# Patient Record
Sex: FEMALE | URBAN - METROPOLITAN AREA
[De-identification: names, ages, dates, MRNs, and addresses within clinical notes are randomized per-mention and may not be internally consistent; named-entity substitution may affect disease eponyms.]

---

## 2024-08-16 ENCOUNTER — TELEPHONE (OUTPATIENT)
Dept: SPORTS MEDICINE | Facility: CLINIC | Age: 24
End: 2024-08-16
Payer: COMMERCIAL

## 2024-08-16 NOTE — TELEPHONE ENCOUNTER
I left a message for the pt explaining that we need to see if she can come to the [place] for her enrique with Dr. Meza next week because he has had a change in his schedule and will no longer be doing clinic at [place]. I asked her to call us back to let us know if that worked or if we needed to reschedule that.

## 2024-08-20 DIAGNOSIS — S83.512A RUPTURE OF ANTERIOR CRUCIATE LIGAMENT OF LEFT KNEE, INITIAL ENCOUNTER: Primary | ICD-10-CM

## 2024-08-21 ENCOUNTER — HOSPITAL ENCOUNTER (OUTPATIENT)
Dept: RADIOLOGY | Facility: HOSPITAL | Age: 24
Discharge: HOME | End: 2024-08-21
Attending: ORTHOPAEDIC SURGERY
Payer: COMMERCIAL

## 2024-08-21 ENCOUNTER — OFFICE VISIT (OUTPATIENT)
Dept: SPORTS MEDICINE | Facility: CLINIC | Age: 24
End: 2024-08-21
Payer: COMMERCIAL

## 2024-08-21 ENCOUNTER — PATIENT MESSAGE (OUTPATIENT)
Dept: SPORTS MEDICINE | Facility: CLINIC | Age: 24
End: 2024-08-21

## 2024-08-21 ENCOUNTER — RESEARCH ENCOUNTER (OUTPATIENT)
Dept: RESEARCH | Facility: HOSPITAL | Age: 24
End: 2024-08-21
Payer: COMMERCIAL

## 2024-08-21 VITALS — BODY MASS INDEX: 23 KG/M2 | HEIGHT: 62 IN | WEIGHT: 125 LBS

## 2024-08-21 DIAGNOSIS — S89.90XA INJURY OF POSTEROLATERAL CORNER OF KNEE, INITIAL ENCOUNTER: ICD-10-CM

## 2024-08-21 DIAGNOSIS — S83.512A RUPTURE OF ANTERIOR CRUCIATE LIGAMENT OF LEFT KNEE, INITIAL ENCOUNTER: Primary | ICD-10-CM

## 2024-08-21 DIAGNOSIS — S83.222A PERIPHERAL TEAR OF MEDIAL MENISCUS OF LEFT KNEE AS CURRENT INJURY, INITIAL ENCOUNTER: ICD-10-CM

## 2024-08-21 DIAGNOSIS — S83.512A RUPTURE OF ANTERIOR CRUCIATE LIGAMENT OF LEFT KNEE, INITIAL ENCOUNTER: ICD-10-CM

## 2024-08-21 DIAGNOSIS — S82.192A OTHER CLOSED FRACTURE OF PROXIMAL END OF LEFT TIBIA, INITIAL ENCOUNTER: ICD-10-CM

## 2024-08-21 PROCEDURE — 73562 X-RAY EXAM OF KNEE 3: CPT | Mod: TC,RT

## 2024-08-21 PROCEDURE — 77073 BONE LENGTH STUDIES: CPT | Mod: TC

## 2024-08-21 PROCEDURE — 73590 X-RAY EXAM OF LOWER LEG: CPT | Mod: TC,LT

## 2024-08-21 PROCEDURE — 99999 PR PBB SHADOW E&M-EST. PATIENT-LVL IV: CPT | Mod: PBBFAC,,, | Performed by: ORTHOPAEDIC SURGERY

## 2024-08-21 PROCEDURE — 99204 OFFICE O/P NEW MOD 45 MIN: CPT | Mod: S$GLB,,, | Performed by: ORTHOPAEDIC SURGERY

## 2024-08-21 PROCEDURE — 73564 X-RAY EXAM KNEE 4 OR MORE: CPT | Mod: TC,LT

## 2024-08-21 PROCEDURE — 1159F MED LIST DOCD IN RCRD: CPT | Mod: CPTII,S$GLB,, | Performed by: ORTHOPAEDIC SURGERY

## 2024-08-21 PROCEDURE — 3008F BODY MASS INDEX DOCD: CPT | Mod: CPTII,S$GLB,, | Performed by: ORTHOPAEDIC SURGERY

## 2024-08-21 RX ORDER — VALACYCLOVIR HYDROCHLORIDE 500 MG/1
1 TABLET, FILM COATED ORAL EVERY MORNING
COMMUNITY
Start: 2024-06-24

## 2024-08-21 NOTE — PROGRESS NOTES
PROTOCOL: Amnion Graft Study  SUBJECT  NUMBER: 11          Visit: Screening  Investigator: Dr. Sarkis Meza     Patient was seen in clinic today for screening visit for the Amnion Graft study.      Informed Consent:      Consenting was completed in private area using the most current IRB approved version of the main Informed Consent Form (ICF) by myself. The patient was given ample time to review the consents prior to execution of the main ICF.     Prior to the ICF being signed, or any study protocol required data collection, testing, procedure, or intervention being performed, the following was done and/or discussed:?   Patient was given a copy of the ICF for review: yes?   Purpose of the study and qualifications to participate: yes???   Study design, follow up schedule, and tests or procedures done at each visit: yes??   Confidentiality and HIPAA Authorization for Release of Medical Records for the research trial/ subject's rights/research related injury: yes??   Risk, Benefits, Alternative Treatments, Compensation and Costs: yes??   Participation in the research trial is voluntary and patient may withdraw at anytime: yes??   Contact information for study related questions: yes??      Patient verbalizes understanding of the above: : yes?   Contact information for CRC and PI given to patient: : yes?   Patient able to adequately summarize: the purpose of the study, the risks associated with the study, and all procedures, testing, and follow-ups associated with the study: yes?      Devika Perez signed the IRB approved version of the main ICF.? All pages of the consents were reviewed with the patient, and all questions answered satisfactorily. The patient signed the paper consent form.      Patient received a fully executed copy of same (copy of informed consent made and provided to patient). The original consents were scanned into electronic medical records (EPIC).     Time of Consent Execution: 10:55 am      Screening was registered in [employer].     Physician assessments will be completed per protocol at her pre-op visit on 04Sep2024.     Patient Assessments completed? No. Patient will complete questionnaires via [employer] portal once she recives the message.                Concomitant medications and medical history listed in the patient's electronic record were reviewed with the patient to ensure accuracy.         End of Visit:     Patient was instructed that her surgery is scheduled for 10Sep2024 and that she would receive follow-up questionnaires via the [employer] portal for research. Patient verbalized understanding, and has all of our contact numbers should she need to get in touch with us before that time

## 2024-08-21 NOTE — PROGRESS NOTES
Patient ID: Smooth Perez  YOB: 2000  MRN: [id number]    Chief Complaint: Pain and Injury of the Left Knee    Referred By: [facility]     History of Present Illness: Smooth Perez is a  24 y.o. female   About to start [employer] School with a chief complaint of Pain and Injury of the Left Knee    Smooth presents today with left knee pain and swelling. She reports an injured 7/20 while playing football in [place]. She reports her knee gave out when she went to [facility] and felt a pop. She was evaluated in [place] where she was for the summer with an MRI. She was told she had an ACL surgery. She has not started rehab yet due to returning to [place] and travel. She reports continued instabiltiy in her knee at this time.     Pain    Injury      Past Medical History:   History reviewed. No pertinent past medical history.  History reviewed. No pertinent surgical history.  No family history on file.  Social History     Socioeconomic History    Marital status: Single     Medication List with Changes/Refills   Current Medications    VALACYCLOVIR (VALTREX) 500 MG TABLET    Take 1 tablet by mouth every morning.     Review of patient's allergies indicates:  No Known Allergies  ROS    Physical Exam:   Body mass index is 22.86 kg/m².  There were no vitals filed for this visit.   GENERAL: Well appearing, appropriate for stated age, no acute distress.  CARDIOVASCULAR: Pulses regular by peripheral palpation.  PULMONARY: Respirations are even and non-labored.  NEURO: Awake, alert, and oriented x 3.  PSYCH: Mood & affect are appropriate.  HEENT: Head is normocephalic and atraumatic.            Right Knee Exam     Range of Motion   Extension:  -10   Flexion:  140     Left Knee Exam     Inspection   Effusion: present    Range of Motion   Extension:  -10   Flexion:  140     Tests   Stability   Lachman: abnormal   PCL-Posterior Drawer: normal (0 to 2mm)  MCL - Valgus: normal (0 to 2mm)  LCL - Varus: normal (0  to 2mm)  Pivot Shift: abnormal    Other   Sensation: normal    Comments:  7/9 Beighton Score   Unable to assess ligament fully due to hamstring firing, guarding, and     Vascular Exam       Left Pulses  Dorsalis Pedis:      2+  Posterior Tibial:      2+          Imaging:    Results  MRI Knee Without Contrast Left (Order [id number])     MRI Knee Without Contrast Left  Order: [id number]  Impression    Complete tear of the anterior cruciate ligament through its mid substance.    Lateral collateral ligament complex is macroscopically intact.  Edema posterior to the posterolateral joint capsule, popliteus muscle, and fibular head of the soleus muscle raises the possibility of a soft tissue injury along the posterolateral corner.    Clinical correlation requested.    Focal marrow edema in the lateral femoral condyle, posterolateral tibia, and posteromedial tibia, compatible with bone contusions.  At the posteromedial corner of the tibia, there is a small cortical interruption, compatible with a nondisplaced fracture  of the posteromedial corner of the tibial plateau, probably with slight depression.  If clinically indicated, this could be further assessed on CT.  Elsewhere, no discrete fracture identified.    High signal along the periphery of the medial meniscus near the junction with the body, at the site of the bone contusion.  A menisco-capsular tear is difficult to fully exclude in this setting.  Elsewhere, no medial or lateral meniscal tear detected.    Small joint effusion.        NOTIFICATION:  Notification was entered by Dr. Jackie Gifford on 7/24/2024 at 17:04 into the [employer] Findings Management dashboard for communication to the referring provider.      Jackie Gifford MD, electronically signed on Jul 24 2024 05:05PM  Narrative    EXAMINATION:  MR KNEE LEFT WO CONTRAST    INDICATION:  r/o ACL rupture    TECHNIQUE:  Multiplanar images of the knee were performed without the administration of intravenous contrast  using a routine MRI knee protocol    COMPARISON:  Left knee radiographs dated 23 July 2024    FINDINGS:  Fluid: Moderate size joint effusion.  Mild nonspecific tenosynovitis.  Trace fluid in a Baker's cyst, small amounts of fluid and edema tracking along medial sided tendons into the calf.    Medial meniscus: Vertigo linear high signal along the periphery of the medial meniscus near the junction of the posterior horn and body is nonspecific (05:23).  A meniscal capsular tear is difficult to fully exclude in this setting.  Elsewhere, no  meniscal tear detected.  Lateral meniscus: No meniscal tear detected    Anterior cruciate ligament:  Complete tear through the midsubstance of the ACL (05:15).  Anterior translation of the tibia with respect to the distal femur (04:10)  Posterior cruciate ligament:  Normal.    Medial collateral ligament:  Normal.  Lateral collateral ligamentous complex:  Within normal limits.  Edema posterior posterolateral joint capsule and popliteus muscle and adjacent to the fibular head of the soleus muscle (03:25).  In the appropriate clinical setting, this could reflect an  injury to the posterolateral corner.    Extensor mechanism:  The patellar and distal quadriceps tendons are within normal limits.    Articular cartilage  Patellofemoral:  Grossly preserved.  Medial femorotibial: Grossly preserved.  Lateral femorotibial: Grossly preserved.    Bone marrow: Focal marrow edema in the anterior weight-bearing lateral femoral condyle and posterolateral tibia, compatible with pivot-shift mechanism bone contusions (5:9-10).  In addition, there is focal marrow edema in the posterior medial tibia.    High signal extends through the tibial plateau cortex deep to the posterior horn adjacent to the posterior horn of the medial meniscus raising the possibility of a small tibial plateau fracture along the extreme posteromedial edge of the tibial plateau,  possibly with cortical width depression  (05:21).    Muscles: Muscle bulk preserved.  No gross muscle edema.    Other: Mild patchy edema in the subcutaneous soft tissues along the anteromedial aspect of the knee and anterior to the inferior patella/proximal patellar tendon and proximal tibia.  Mild patchy edema in the popliteal fossa  Exam End: 07/24/24 13:45    Specimen Collected: 07/24/24 16:06 Last Resulted: 07/24/24 16:05   Received From: [facility]  Result Received: 08/14/24 13:24      R Knee 3 VW Left  Order: [id number]  Narrative    EXAMINATION:  XR KNEE 3 VW LEFT    INDICATION:  pain    COMPARISON:  None    FINDINGS:  Left knee: Upright AP, lateral and sunrise view of the left knee show no fracture or dislocation.  No radiopaque foreign body or destructive bone lesion.  No significant degenerative change.    Right knee: Limited single view upright AP of the left knee shows no significant degenerative change.      Carlos Corbett MD, electronically signed on Jul 23 2024 12:47PM  Exam End: 07/23/24 10:01    Specimen Collected: 07/23/24 11:48 Last Resulted: 07/23/24 11:47   Received From: [facility]  Result Received: 08/14/24 13:24          Relevant imaging results reviewed and interpreted by me, discussed with the patient and / or family today.  Agree with imaging read    Other Tests:         Patient Instructions   Assessment:  Smooth Perez is a  24 y.o. female   About to start [employer] with a chief complaint of Pain and Injury of the Left Knee    Left knee injury during recreational football on 7/20/24  ACL tear   Medial meniscus tear, ramp lesion     Encounter Diagnoses   Name Primary?    Rupture of anterior cruciate ligament of left knee, initial encounter Yes    Peripheral tear of medial meniscus of left knee as current injury, initial encounter     Other closed fracture of proximal end of left tibia, initial encounter     Injury of posterolateral corner of knee, initial encounter         Plan:  Discussed treatment  options including surgery and non-operative manage. Advised patient will likely need surgery due to the level of knee instability.  Will hold surgery date of 9/10   Order PT at [facility] - 2-3x per week for 6-8 weeks  With [facility] for ACL prehab  Pre-op labs on the way out  ACL xrays including alignment and full  lateral tibia to assess posterior slope    Timing of Common ACL Milestones (Pain, Walking, etc)  Functional Progression after ACL Surgery (Running, etc)  ACL Reconstruction Information      Follow-up: 2-3 weeks for pror sooner if there are any problems between now and then.    Leave Review:   Google: Leave Google Review  Healthgrades: Leave Healthgrades Review    After Hours Number: [phone]       Provider Note/Medical Decision Making:       I had a long discussion with the patient and any present family regarding treatment options. I explained that although the ACL will usually not heal on its own, that some people are able to function well without an ACL. We discussed the continued risk of meniscal damage if the patient has repeat instability and buckling-type episodes. We discussed graft options and the differences between allograft and autograft, and the pros and cons of the different allograft and autograft types including, but not limited to, bone-patellar tendon-bone, quadriceps tendon, and hamstring. We discussed the expected recovery time of a minimum of 6-9 months after surgery before return to cutting or contact activity. We discussed that [employer] players take an average of 10-11 months before return to play.  We discussed that some studies show a return to play prior to 9 months increases the risk of retear by a factor of 7.  We also discussed the need for strict adherence to the postoperative protocol and rehabilitation instructions.  We discussed the risk of arthrofibrosis and some of the precautions and postoperative rehabilitation specifics needed to lessen this risk.  We discussed the risk of  retear and the risk of arthritis relative to the ACL injury, with and without surgery.  I had a long discussion with the patient and any present family regarding treatment options. I explained that although the meniscus will usually not heal on its own, not all meniscus tears need surgery. We discussed that many people will improve with therapy and nonoperative management. We discussed the blood supply of the meniscus and the fact that some patients and some tear patterns are more amenable to repair. We discussed that when performing operative treatment of meniscus tears, we attempt to repair tears that we think need to be repaired and have a good chance of healing. If we do perform a meniscectomy, we attempt to leave as much meniscus intact as possible. We discussed the implications of having a torn or deficient meniscus. We discussed the rehab, weight bearing, and postoperative differences between repair and resection, and we discussed postoperative expectations.  I discussed worrisome and red flag signs and symptoms with the patient. The patient expressed understanding and agreed to alert me immediately or to go to the emergency room if they experience any of these.   Treatment plan was developed with input from the patient/family, and they expressed understanding and agreement with the plan. All questions were answered today.          Sarkis Meza MD  Orthopaedic Surgery & Sports Medicine       Disclaimer: This note was prepared using a voice recognition system and is likely to have sound alike errors within the text.     I, Moni Butt, acted as a scribe for Sarkis Meza MD for the duration of this office visit.

## 2024-08-21 NOTE — PATIENT INSTRUCTIONS
Assessment:  Smooth Perez is a  24 y.o. female   About to start [employer] School with a chief complaint of Pain and Injury of the Left Knee    Left knee injury during recreational football on 7/20/24  ACL tear   Medial meniscus tear, ramp lesion     Encounter Diagnoses   Name Primary?    Rupture of anterior cruciate ligament of left knee, initial encounter Yes    Peripheral tear of medial meniscus of left knee as current injury, initial encounter     Other closed fracture of proximal end of left tibia, initial encounter     Injury of posterolateral corner of knee, initial encounter         Plan:  Discussed treatment options including surgery and non-operative manage. Advised patient will likely need surgery due to the level of knee instability.  Will hold surgery date of 9/10   Order PT at [facility] - 2-3x per week for 6-8 weeks  With [facility] for ACL prehab  Pre-op labs on the way out  ACL xrays including alignment and full  lateral tibia to assess posterior slope    Timing of Common ACL Milestones (Pain, Walking, etc)  Functional Progression after ACL Surgery (Running, etc)  ACL Reconstruction Information      Follow-up: 2-3 weeks for pror sooner if there are any problems between now and then.    Leave Review:   Google: Leave Google Review  Healthgrades: Leave Healthgrades Review    After Hours Number: [phone]

## 2024-08-23 ENCOUNTER — CLINICAL SUPPORT (OUTPATIENT)
Facility: HOSPITAL | Age: 24
End: 2024-08-23
Attending: ORTHOPAEDIC SURGERY
Payer: COMMERCIAL

## 2024-08-23 DIAGNOSIS — M62.81 WEAKNESS OF LEFT QUADRICEPS MUSCLE: ICD-10-CM

## 2024-08-23 DIAGNOSIS — M62.559 ATROPHY OF QUADRICEPS FEMORIS MUSCLE: ICD-10-CM

## 2024-08-23 DIAGNOSIS — S83.512A RUPTURE OF ANTERIOR CRUCIATE LIGAMENT OF LEFT KNEE, INITIAL ENCOUNTER: ICD-10-CM

## 2024-08-23 DIAGNOSIS — S83.512D SPRAIN OF ANTERIOR CRUCIATE LIGAMENT OF LEFT KNEE, SUBSEQUENT ENCOUNTER: Primary | ICD-10-CM

## 2024-08-23 PROCEDURE — 97014 ELECTRIC STIMULATION THERAPY: CPT | Mod: PN

## 2024-08-23 PROCEDURE — 97161 PT EVAL LOW COMPLEX 20 MIN: CPT | Mod: PN

## 2024-08-23 PROCEDURE — 97112 NEUROMUSCULAR REEDUCATION: CPT | Mod: PN

## 2024-08-23 PROCEDURE — 97016 VASOPNEUMATIC DEVICE THERAPY: CPT | Mod: PN

## 2024-08-23 NOTE — PLAN OF CARE
[facility]   Physical Therapy Initial Evaluation      Date: 8/23/2024   Name: Smooth Perez  Clinic Number: [id number]    Therapy Diagnosis:   Encounter Diagnoses   Name Primary?    Rupture of anterior cruciate ligament of left knee, initial encounter     Sprain of anterior cruciate ligament of left knee, subsequent encounter Yes    Weakness of left quadriceps muscle     Atrophy of quadriceps femoris muscle       Physician: Sarkis Meza MD     Physician Orders: PT Eval and Treat  Medical Diagnosis from Referral: Rupture of anterior cruciate ligament of left knee, initial encounter [S83.512A]   Evaluation Date: 8/23/2024  Authorization Period Expiration: 8/21/25  Plan of Care Expiration: 9/11/2024  Progress Note Due: 9/11/2024  Visit # / Visits authorized: 1/1   FOTO: 1/3 (last performed on 8/23/2024)    Precautions: Standard    Time In: 8:55 AM  Time Out: 9:50 AM  Total Billable Time (timed & untimed codes): 55 minutes    Subjective     Date of onset: 7/20/2024    History of current condition - Smooth reports she injured her knee while playing football in [place]. 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  Prior Level of Function: Independent and pain free with all ADL, IADL, community mobility and functional activities.   Current Level of Function: Independent with all ADL, IADL, community mobility and functional activities with reports of increased pain and need for increased time and frequent breaks.      Dominant Extremity:    [] Right    [x] Left    Pts goals: Pt reported goals are to decrease overall pain levels in order to return to prior functional level.     Medical History:   No past  medical history on file.    Surgical History:   Smooth Perez  has no past surgical history on file.    Medications:   Smooth has a current medication list which includes the following prescription(s): valacyclovir.    Allergies:   Review of patient's allergies indicates:  No Known Allergies     Objective        Range of Motion:   L Knee PROM: 4-0-125  R Knee PROM: 4-0-144      Lower Extremity Strength  Left LE  Right LE    Knee extension: 4/5 Knee extension: 5/5   Knee flexion: 4/5 Knee flexion: 5/5   Hip extension:  4+/5 Hip extension: 4+/5   Hip abduction: NT Hip abduction: NT         Function:  - Gait within normal limits   - Squat: able to partial squat and rise to stand   - Single Leg Squat: not tested  - Single Leg Step Down Test: not tested        Function:     CMS Impairment/Limitation/Restriction for FOTO Knee Survey    Therapist reviewed FOTO scores for Smooth on 8/23/2024.   FOTO documents entered into [email or web address] - see Media section.    Functional Score: 57%         Treatment     Total Treatment time (time-based codes) separate from Evaluation: (30) minutes     Smooth received the treatments listed below:    Intervention Code  (see below chart) Date/Notes  8/23/24   GameReady  10 minutes   NMES: Quad Sets NR 10 minutes   Mini Squats NR 10 minutes     0 minutes of Therapeutic Exercise (TE) to develop strength, endurance, ROM, and flexibility. (15858)  0 minutes of Manual therapy (MT) to improve pain and ROM. (80419)  15 minutes of Neuromuscular Re-Education (NR)  to improve: Balance, Coordination, Kinesthetic, Sense, Proprioception, and Posture. (44417)  0 minutes of Therapeutic Activities (TA) to improve functional performance. (93210)  Unattended Electrical Stimulation (ES) for muscle performance and/or pain modulation. (75808)  Vasopneumatic Device Therapy () for management of swelling/edema. (86264)  BFR: Blood flow restriction applied during exercise  NP: Not Performed      Patient Education and  Home Exercises     Education provided:  PURPOSE: Patient educated on the impairments noted above and the effects of physical therapy intervention to improve overall condition and QOL.   EXERCISE: Patient was educated on all the above exercise prior/during/after for proper posture, positioning, and execution for safe performance with home exercise program.   STRENGTH: Patient educated on the importance of improved core and extremity strength in order to improve alignment of the spine and extremities with static positions and dynamic movement.     Written Home Exercises Provided: yes.  Exercises were reviewed and Smooth was able to demonstrate them prior to the end of the session.  Smooth demonstrated good  understanding of the education provided. See EMR under Patient Instructions for exercises provided during therapy sessions.    Assessment     Smooth is a 24 y.o. female referred to outpatient Physical Therapy with a medical diagnosis of Rupture of anterior cruciate ligament of left knee, initial encounter [S83.512A] . Pt presents with impairments in the following categories: IMPAIRMENTS: strength, endurance, joint mobility, core strength and stability, and functional movement patterns. Prioritize good Range of motion and quadriceps activation in preparation for ACL surgery.    Pt prognosis is Good  Pt will benefit from skilled outpatient Physical Therapy to address the deficits stated above and in the chart below, provide pt/family education, and to maximize pt's level of independence.     Plan of care discussed with patient: Yes  Pt's spiritual, cultural and educational needs considered and patient is agreeable to the plan of care and goals as stated below:     Anticipated Barriers for therapy: none    Medical Necessity is demonstrated by the following  History  Co-morbidities and personal factors that may impact the plan of care [x] LOW: no personal factors / co-morbidities  [] MODERATE: 1-2 personal factors /  "co-morbidities  [] HIGH: 3+ personal factors / co-morbidities    Moderate / High Support Documentation:   No past medical history on file.     Examination  Body Structures and Functions, activity limitations and participation restrictions that may impact the plan of care [x] LOW: addressing 1-2 elements  [] MODERATE: 3+ elements  [] HIGH: 4+ elements (please support below)    Moderate / High Support Documentation: See above in "Current Level of Function"      Clinical Presentation [x] LOW: stable  [] MODERATE: Evolving  [] HIGH: Unstable     Decision Making/ Complexity Score: low           Goals:  3 weeks Status Date Met   PAIN: Pt will report worst pain of 2/10 in order to progress toward max functional ability and improve quality of life [x] Progressing  [] Met  [] Not Met    FUNCTION: Patient will demonstrate improved function as indicated by a functional score of greater than or equal to 55 out of 100 on FOTO. [x] Progressing  [] Met  [] Not Met    MOBILITY: Patient will improve AROM to stated goals, listed in objective measures above, in order to return to maximal functional potential and improve quality of life. Goal : L knee flexion to 135 degrees [x] Progressing  [] Met  [] Not Met    STRENGTH: Patient will improve strength to stated goals, listed in objective measures above, in order to improve functional independence and quality of life. Goal: 4+/5 quad strength L [x] Progressing  [] Met  [] Not Met    GAIT: Patient will demonstrate normalized gait mechanics with minimal compensation in order to return to PLOF. [x] Progressing  [] Met  [] Not Met      Plan     Plan of care Certification: 8/23/2024 to 9/11/24.    Outpatient Physical Therapy 2 times weekly for 4 weeks to include any combination of the following interventions: virtual visits, dry needling, modalities, electrical stimulation (IFC, Pre-Mod, Attended with Functional Dry Needling), Manual Therapy, Neuromuscular Re-ed, Patient Education, Self " Care, Therapeutic Exercise, and Therapeutic Activites       Iraj Huang, PT, DPT  Physical Therapist  Board-Certified Specialist in Orthopaedic and Sports Physical Therapy  8/23/2024      I CERTIFY THE NEED FOR THESE SERVICES FURNISHED UNDER THIS PLAN OF TREATMENT AND WHILE UNDER MY CARE   Physician's comments:     Physician's Signature: ___________________________________________________

## 2024-08-23 NOTE — PROGRESS NOTES
See plan of care for initial evaluation.        Iraj Huang, PT, DPT  Physical Therapist  Board-Certified Specialist in Orthopaedic and Sports Physical Therapy  8/23/2024

## 2024-08-27 ENCOUNTER — CLINICAL SUPPORT (OUTPATIENT)
Facility: HOSPITAL | Age: 24
End: 2024-08-27
Payer: COMMERCIAL

## 2024-08-27 DIAGNOSIS — S83.512D SPRAIN OF ANTERIOR CRUCIATE LIGAMENT OF LEFT KNEE, SUBSEQUENT ENCOUNTER: Primary | ICD-10-CM

## 2024-08-27 PROCEDURE — 97140 MANUAL THERAPY 1/> REGIONS: CPT | Mod: PN

## 2024-08-27 PROCEDURE — 97112 NEUROMUSCULAR REEDUCATION: CPT | Mod: PN

## 2024-08-27 PROCEDURE — 97110 THERAPEUTIC EXERCISES: CPT | Mod: PN

## 2024-08-27 PROCEDURE — 97014 ELECTRIC STIMULATION THERAPY: CPT | Mod: PN

## 2024-08-27 PROCEDURE — 97016 VASOPNEUMATIC DEVICE THERAPY: CPT | Mod: PN

## 2024-08-27 PROCEDURE — 97750 PHYSICAL PERFORMANCE TEST: CPT | Mod: PN

## 2024-08-27 NOTE — PROGRESS NOTES
[facility] OUTPATIENT THERAPY AND WELLNESS   Physical Therapy Treatment Note     Name: Smooth Perez  Clinic Number: [id number]    Therapy Diagnosis:   Encounter Diagnosis   Name Primary?    Sprain of anterior cruciate ligament of left knee, subsequent encounter Yes     Physician: Sarkis Meza MD    Visit Date: 8/27/2024    Physician Orders: PT Eval and Treat  Medical Diagnosis from Referral: Rupture of anterior cruciate ligament of left knee, initial encounter [S83.512A]   Evaluation Date: 8/23/2024  Authorization Period Expiration: 8/21/25  Plan of Care Expiration: 9/11/2024  Progress Note Due: 9/11/2024  Visit # / Visits authorized: 1/12 (1/1 eval)  FOTO: 1/3 (last performed on 8/23/2024)    Time In: 1:30 PM  Time Out: 2:45 PM  Total Billable Time: 75 minutes    SUBJECTIVE     Pt reports: she is doing well today. She reports she has been consistent working on her HEP.  She was compliant with home exercise program.  Response to previous treatment: no notable change  Functional change: no notable change    Pain: 1/10  Location: left knee    OBJECTIVE        Contralateral Limb Biodex at 60 degrees          Treatment     Intervention Code  (see below chart) Date/Notes  8/27/24   GameReady  10 minutes   Manual Therapy  MT Patella mobs; knee PROM   Upright Bike TE 10 minutes   NMES: Quad Sets NR 10 minutes   NMES: SLR NR 10 minutes   Squats NR At 18 inch box ; 3x10   Single leg balance NR 5x30s   Biodex Test PPT Non-op limb at 60 degrees per second     10 minutes of Therapeutic Exercise (TE) to develop strength, endurance, ROM, and flexibility. (03986)  8 minutes of Manual therapy (MT) to improve pain and ROM. (94652)  40 minutes of Neuromuscular Re-Education (NR)  to improve: Balance, Coordination, Kinesthetic, Sense, Proprioception, and Posture. (35013)  0 minutes of Therapeutic Activities (TA) to improve functional performance. (74015)  15 minutes of physical Performance Testing (PPT)  Unattended Electrical  Stimulation (ES) for muscle performance and/or pain modulation. (94568)  Vasopneumatic Device Therapy () for management of swelling/edema. (34678)  BFR: Blood flow restriction applied during exercise  NP: Not Performed    Patient Education and Home Exercises     Home Exercises Provided and Patient Education Provided     Education provided:   - HEP, PT POC    Written Home Exercises Provided: yes. Exercises were reviewed and Smooth was able to demonstrate them prior to the end of the session.  Smooth demonstrated good  understanding of the education provided. See EMR under Patient Instructions for exercises provided during therapy sessions    ASSESSMENT     Patient with good quality quad set and good range of motion. She will benefit from continued physical therapy care.    Smooth Is progressing well towards her goals.   Pt prognosis is Excellent.     Pt will continue to benefit from skilled outpatient physical therapy to address the deficits listed in the problem list box on initial evaluation, provide pt/family education and to maximize pt's level of independence in the home and community environment.     Pt's spiritual, cultural and educational needs considered and pt agreeable to plan of care and goals.     Anticipated barriers to physical therapy: None    Goals:     Goals:  3 weeks Status Date Met   PAIN: Pt will report worst pain of 2/10 in order to progress toward max functional ability and improve quality of life [x] Progressing  [] Met  [] Not Met     FUNCTION: Patient will demonstrate improved function as indicated by a functional score of greater than or equal to 55 out of 100 on FOTO. [x] Progressing  [] Met  [] Not Met     MOBILITY: Patient will improve AROM to stated goals, listed in objective measures above, in order to return to maximal functional potential and improve quality of life. Goal : L knee flexion to 135 degrees [x] Progressing  [] Met  [] Not Met     STRENGTH: Patient will improve  strength to stated goals, listed in objective measures above, in order to improve functional independence and quality of life. Goal: 4+/5 quad strength L [x] Progressing  [] Met  [] Not Met     GAIT: Patient will demonstrate normalized gait mechanics with minimal compensation in order to return to PLOF. [x] Progressing  [] Met  [] Not Met          PLAN   Continue per plan of care.  Progress as tolerated.    Iraj Huang, PT

## 2024-09-04 ENCOUNTER — OFFICE VISIT (OUTPATIENT)
Dept: SPORTS MEDICINE | Facility: CLINIC | Age: 24
End: 2024-09-04
Payer: COMMERCIAL

## 2024-09-04 VITALS — WEIGHT: 125 LBS | BODY MASS INDEX: 23 KG/M2 | HEIGHT: 62 IN

## 2024-09-04 DIAGNOSIS — S83.512D RUPTURE OF ANTERIOR CRUCIATE LIGAMENT OF LEFT KNEE, SUBSEQUENT ENCOUNTER: ICD-10-CM

## 2024-09-04 DIAGNOSIS — S83.222D PERIPHERAL TEAR OF MEDIAL MENISCUS OF LEFT KNEE AS CURRENT INJURY, SUBSEQUENT ENCOUNTER: ICD-10-CM

## 2024-09-04 DIAGNOSIS — D57.3 SICKLE CELL TRAIT: Primary | ICD-10-CM

## 2024-09-04 PROCEDURE — 99999 PR PBB SHADOW E&M-EST. PATIENT-LVL III: CPT | Mod: PBBFAC,,, | Performed by: ORTHOPAEDIC SURGERY

## 2024-09-04 PROCEDURE — 97760 ORTHOTIC MGMT&TRAING 1ST ENC: CPT | Mod: S$GLB,,, | Performed by: ORTHOPAEDIC SURGERY

## 2024-09-04 PROCEDURE — 3008F BODY MASS INDEX DOCD: CPT | Mod: CPTII,S$GLB,, | Performed by: ORTHOPAEDIC SURGERY

## 2024-09-04 PROCEDURE — 1159F MED LIST DOCD IN RCRD: CPT | Mod: CPTII,S$GLB,, | Performed by: ORTHOPAEDIC SURGERY

## 2024-09-04 PROCEDURE — 99214 OFFICE O/P EST MOD 30 MIN: CPT | Mod: S$GLB,,, | Performed by: ORTHOPAEDIC SURGERY

## 2024-09-04 NOTE — PATIENT INSTRUCTIONS
Assessment:  Smooth Perez is a  24 y.o. 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    Sickle cell trait Yes    Peripheral tear of medial meniscus of left knee as current injury, subsequent encounter     Rupture of anterior cruciate ligament of left knee, subsequent encounter        Plan:  Labs reviewed. Discussed increased risk of blood clot post operatively due to patietns history of sickle cell trait. Will plan for stronger anti-coagulation medication post operatively.   Left knee arthroscopy, anterior cruciate ligmanet reconstruction with quadriceps tendon autograft, mensicus surgery as indicated, possible application of amnion graft to harvest site, any indicated procedures  PARTICIPANT AND CONSENTED FOR AMNION STUDY TRIAL  Discussed   Continue physical therapy with Iraj michael [place]. Will need post op PT to start PO Day 2  Fit for TROM BRACE to be used after surgery  10 minutes were spent sizing, fitting, and educating regarding durable medical equipment by Dr. Sarkis Meza and his assistant under his direction today.  CPT 97095.  Timing of Common ACL Milestones (Pain, Walking, etc)  Functional Progression after ACL Surgery (Running, etc)  ACL Reconstruction Information      Follow-up: SURGERY for pror sooner if there are any problems between now and then.    Leave Review:   Google: Leave Google Review  Healthgrades: Leave Healthgrades Review    After Hours Number: [phone]

## 2024-09-04 NOTE — H&P (VIEW-ONLY)
Patient ID: Smooth Perez  YOB: 2000  MRN: [id number]    Chief Complaint: Pain of the Left Knee    Referred By: [facility]     History of Present Illness: Smooth Perez is a  24 y.o. female   About to start [employer] School with a chief complaint of Pain of the Left Knee    Smooth presents today for follow up of her left knee. She has been attending physical therapy at [facility] with Iraj 2x a week. She reports a 5/10 knee pain today. She presents today to discuss surgical intervention. Of note she has been consented for the amnion research study.     Recall from 8/21/24: Smooth presents today with left knee pain and swelling. She reports an injured 7/20 while playing football in [place]. She reports her knee gave out when she went to [facility] and felt a pop. She was evaluated in [place] where she was for the summer with an MRI. She was told she had an ACL surgery. She has not started rehab yet due to returning to [place] and travel. She reports continued instabiltiy in her knee at this time.     Pain    Injury      Past Medical History:   History reviewed. No pertinent past medical history.  History reviewed. No pertinent surgical history.  No family history on file.  Social History     Socioeconomic History    Marital status: Single     Medication List with Changes/Refills   Current Medications    VALACYCLOVIR (VALTREX) 500 MG TABLET    Take 1 tablet by mouth every morning.     Review of patient's allergies indicates:  No Known Allergies  ROS    Physical Exam:   Body mass index is 22.86 kg/m².  There were no vitals filed for this visit.   GENERAL: Well appearing, appropriate for stated age, no acute distress.  CARDIOVASCULAR: Pulses regular by peripheral palpation.  PULMONARY: Respirations are even and non-labored.  NEURO: Awake, alert, and oriented x 3.  PSYCH: Mood & affect are appropriate.  HEENT: Head is normocephalic and atraumatic.            Right Knee Exam     Range of  Motion   Extension:  -10   Flexion:  140     Left Knee Exam     Inspection   Effusion: present    Range of Motion   Extension:  -10   Flexion:  140     Tests   Stability   Lachman: abnormal   PCL-Posterior Drawer: normal (0 to 2mm)  MCL - Valgus: normal (0 to 2mm)  LCL - Varus: normal (0 to 2mm)  Pivot Shift: abnormal    Other   Sensation: normal    Comments:  7/9 Beighton Score   Unable to assess ligament fully due to hamstring firing, guarding, and     Vascular Exam       Left Pulses  Dorsalis Pedis:      2+  Posterior Tibial:      2+          Imaging:    Results  MRI Knee Without Contrast Left (Order [id number])     MRI Knee Without Contrast Left  Order: [id number]  Impression    Complete tear of the anterior cruciate ligament through its mid substance.    Lateral collateral ligament complex is macroscopically intact.  Edema posterior to the posterolateral joint capsule, popliteus muscle, and fibular head of the soleus muscle raises the possibility of a soft tissue injury along the posterolateral corner.    Clinical correlation requested.    Focal marrow edema in the lateral femoral condyle, posterolateral tibia, and posteromedial tibia, compatible with bone contusions.  At the posteromedial corner of the tibia, there is a small cortical interruption, compatible with a nondisplaced fracture  of the posteromedial corner of the tibial plateau, probably with slight depression.  If clinically indicated, this could be further assessed on CT.  Elsewhere, no discrete fracture identified.    High signal along the periphery of the medial meniscus near the junction with the body, at the site of the bone contusion.  A menisco-capsular tear is difficult to fully exclude in this setting.  Elsewhere, no medial or lateral meniscal tear detected.    Small joint effusion.        NOTIFICATION:  Notification was entered by Dr. Jackie Gifford on 7/24/2024 at 17:04 into the [employer] dashboard for communication to  the referring provider.      Jackie Gifford MD, electronically signed on Jul 24 2024 05:05PM  Narrative    EXAMINATION:  MR KNEE LEFT WO CONTRAST    INDICATION:  r/o ACL rupture    TECHNIQUE:  Multiplanar images of the knee were performed without the administration of intravenous contrast using a routine MRI knee protocol    COMPARISON:  Left knee radiographs dated 23 July 2024    FINDINGS:  Fluid: Moderate size joint effusion.  Mild nonspecific tenosynovitis.  Trace fluid in a Baker's cyst, small amounts of fluid and edema tracking along medial sided tendons into the calf.    Medial meniscus: Vertigo linear high signal along the periphery of the medial meniscus near the junction of the posterior horn and body is nonspecific (05:23).  A meniscal capsular tear is difficult to fully exclude in this setting.  Elsewhere, no  meniscal tear detected.  Lateral meniscus: No meniscal tear detected    Anterior cruciate ligament:  Complete tear through the midsubstance of the ACL (05:15).  Anterior translation of the tibia with respect to the distal femur (04:10)  Posterior cruciate ligament:  Normal.    Medial collateral ligament:  Normal.  Lateral collateral ligamentous complex:  Within normal limits.  Edema posterior posterolateral joint capsule and popliteus muscle and adjacent to the fibular head of the soleus muscle (03:25).  In the appropriate clinical setting, this could reflect an  injury to the posterolateral corner.    Extensor mechanism:  The patellar and distal quadriceps tendons are within normal limits.    Articular cartilage  Patellofemoral:  Grossly preserved.  Medial femorotibial: Grossly preserved.  Lateral femorotibial: Grossly preserved.    Bone marrow: Focal marrow edema in the anterior weight-bearing lateral femoral condyle and posterolateral tibia, compatible with pivot-shift mechanism bone contusions (5:9-10).  In addition, there is focal marrow edema in the posterior medial tibia.    High signal  extends through the tibial plateau cortex deep to the posterior horn adjacent to the posterior horn of the medial meniscus raising the possibility of a small tibial plateau fracture along the extreme posteromedial edge of the tibial plateau,  possibly with cortical width depression (05:21).    Muscles: Muscle bulk preserved.  No gross muscle edema.    Other: Mild patchy edema in the subcutaneous soft tissues along the anteromedial aspect of the knee and anterior to the inferior patella/proximal patellar tendon and proximal tibia.  Mild patchy edema in the popliteal fossa  Exam End: 07/24/24 13:45    Specimen Collected: 07/24/24 16:06 Last Resulted: 07/24/24 16:05   Received From: [facility]  Result Received: 08/14/24 13:24      R Knee 3 VW Left  Order: [id number]  Narrative    EXAMINATION:  XR KNEE 3 VW LEFT    INDICATION:  pain    COMPARISON:  None    FINDINGS:  Left knee: Upright AP, lateral and sunrise view of the left knee show no fracture or dislocation.  No radiopaque foreign body or destructive bone lesion.  No significant degenerative change.    Right knee: Limited single view upright AP of the left knee shows no significant degenerative change.      Carlos Corbett MD, electronically signed on Jul 23 2024 12:47PM  Exam End: 07/23/24 10:01    Specimen Collected: 07/23/24 11:48 Last Resulted: 07/23/24 11:47   Received From: [facility]  Result Received: 08/14/24 13:24          Relevant imaging results reviewed and interpreted by me, discussed with the patient and / or family today.  Agree with imaging read    Other Tests:     Lab Results   Component Value Date    LABPROT 10.7 08/21/2024    INR 1.0 08/21/2024    APTT 26.6 08/21/2024     08/21/2024    K 4.8 08/21/2024     08/21/2024    CO2 25 08/21/2024    GLU 84 08/21/2024    BUN 14 08/21/2024    CREATININE 0.9 08/21/2024    CALCIUM 10.1 08/21/2024    ANIONGAP 9 08/21/2024    EGFRNORACEVR >60.0 08/21/2024    WBC 6.32  08/21/2024    RBC 4.27 08/21/2024    HGB 14.2 08/21/2024    HCT 42.3 08/21/2024    MCV 99 (H) 08/21/2024    MCH 33.3 (H) 08/21/2024    MCHC 33.6 08/21/2024    RDW 12.5 08/21/2024     08/21/2024    MPV 9.9 08/21/2024    IMMGR 0.2 08/21/2024    GRAN 4.1 08/21/2024    GRAN 64.6 08/21/2024    IGABS 0.01 08/21/2024    LYMPH 1.9 08/21/2024    LYMPH 29.6 08/21/2024    MONO 0.3 08/21/2024    MONO 5.1 08/21/2024    EOS 0.0 08/21/2024    BASO 0.01 08/21/2024    NRBC 0 08/21/2024    EOSINOPHIL 0.3 08/21/2024    BASOPHIL 0.2 08/21/2024         Patient Instructions   Assessment:  Smooth Perez is a  24 y.o. 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    Sickle cell trait Yes    Peripheral tear of medial meniscus of left knee as current injury, subsequent encounter     Rupture of anterior cruciate ligament of left knee, subsequent encounter        Plan:  Labs reviewed. Discussed increased risk of blood clot post operatively due to patietns history of sickle cell trait. Will plan for stronger anti-coagulation medication post operatively.   Left knee arthroscopy, anterior cruciate ligmanet reconstruction with quadriceps tendon autograft, mensicus surgery as indicated, possible application of amnion graft to harvest site, any indicated procedures  PARTICIPANT AND CONSENTED FOR AMNION STUDY TRIAL  Discussed   Continue physical therapy with Iraj [facility]. Will need post op PT to start PO Day 2  Fit for TROM BRACE to be used after surgery  10 minutes were spent sizing, fitting, and educating regarding durable medical equipment by Dr. Sarkis Meza and his assistant under his direction today.  CPT 50741.  Timing of Common ACL Milestones (Pain, Walking, etc)  Functional Progression  after ACL Surgery (Running, etc)  ACL Reconstruction Information      Follow-up: SURGERY for pror sooner if there are any problems between now and then.    Leave Review:   Google: Leave Google Review  Healthgrades: Leave Healthgrades Review    After Hours Number: [phone]     Provider Note/Medical Decision Making:       I had a long discussion with the patient about treatment options, including operative and nonoperative treatments. We discussed pros and cons of each including risks pertinent to surgery including pain, infection, bleeding, damage to adjacent structures like nerves and blood vessels, failure to heal, need for future surgeries, stiffness, instability, loss of limb, anesthesia risks like stroke, blood clot, loss of life. We discussed the possibility of need for later hardware removal in the case that hardware was used. We discussed common and uncommon risks, and discussed patient specific factors that may increase the risks present with surgery. All questions were answered. The patient expressed understanding of the pros and cons of surgery and wanted to proceed with surgical treatment.  I had a long discussion with the patient and any present family regarding treatment options. I explained that although the ACL will usually not heal on its own, that some people are able to function well without an ACL. We discussed the continued risk of meniscal damage if the patient has repeat instability and buckling-type episodes. We discussed graft options and the differences between allograft and autograft, and the pros and cons of the different allograft and autograft types including, but not limited to, bone-patellar tendon-bone, quadriceps tendon, and hamstring. We discussed the expected recovery time of a minimum of 6-9 months after surgery before return to cutting or contact activity. We discussed that [employer] players take an average of 10-11 months before return to play.  We discussed that some studies  show a return to play prior to 9 months increases the risk of retear by a factor of 7.  We also discussed the need for strict adherence to the postoperative protocol and rehabilitation instructions.  We discussed the risk of arthrofibrosis and some of the precautions and postoperative rehabilitation specifics needed to lessen this risk.  We discussed the risk of retear and the risk of arthritis relative to the ACL injury, with and without surgery.  I had a long discussion with the patient and any present family regarding treatment options. I explained that although the meniscus will usually not heal on its own, not all meniscus tears need surgery. We discussed that many people will improve with therapy and nonoperative management. We discussed the blood supply of the meniscus and the fact that some patients and some tear patterns are more amenable to repair. We discussed that when performing operative treatment of meniscus tears, we attempt to repair tears that we think need to be repaired and have a good chance of healing. If we do perform a meniscectomy, we attempt to leave as much meniscus intact as possible. We discussed the implications of having a torn or deficient meniscus. We discussed the rehab, weight bearing, and postoperative differences between repair and resection, and we discussed postoperative expectations.  I discussed worrisome and red flag signs and symptoms with the patient. The patient expressed understanding and agreed to alert me immediately or to go to the emergency room if they experience any of these.   Treatment plan was developed with input from the patient/family, and they expressed understanding and agreement with the plan. All questions were answered today.          Sarkis Meza MD  Orthopaedic Surgery & Sports Medicine       Disclaimer: This note was prepared using a voice recognition system and is likely to have sound alike errors within the text.     IMoni,  acted as a scribe for Sarkis Meza MD for the duration of this office visit.

## 2024-09-04 NOTE — PROGRESS NOTES
Patient ID: Smooth Perez  YOB: 2000  MRN: [id number]    Chief Complaint: Pain of the Left Knee    Referred By: [facility]     History of Present Illness: Smooth Perez is a  24 y.o. female   About to start [employer] School with a chief complaint of Pain of the Left Knee    Smooth presents today for follow up of her left knee. She has been attending physical therapy at [facility] with Iraj 2x a week. She reports a 5/10 knee pain today. She presents today to discuss surgical intervention. Of note she has been consented for the amnion research study.     Recall from 8/21/24: Smooth presents today with left knee pain and swelling. She reports an injured 7/20 while playing football in [place]. She reports her knee gave out when she went to [facility] and felt a pop. She was evaluated in [place] where she was for the summer with an MRI. She was told she had an ACL surgery. She has not started rehab yet due to returning to [place] and travel. She reports continued instabiltiy in her knee at this time.     Pain    Injury      Past Medical History:   History reviewed. No pertinent past medical history.  History reviewed. No pertinent surgical history.  No family history on file.  Social History     Socioeconomic History    Marital status: Single     Medication List with Changes/Refills   Current Medications    VALACYCLOVIR (VALTREX) 500 MG TABLET    Take 1 tablet by mouth every morning.     Review of patient's allergies indicates:  No Known Allergies  ROS    Physical Exam:   Body mass index is 22.86 kg/m².  There were no vitals filed for this visit.   GENERAL: Well appearing, appropriate for stated age, no acute distress.  CARDIOVASCULAR: Pulses regular by peripheral palpation.  PULMONARY: Respirations are even and non-labored.  NEURO: Awake, alert, and oriented x 3.  PSYCH: Mood & affect are appropriate.  HEENT: Head is normocephalic and atraumatic.            Right Knee Exam     Range of  Motion   Extension:  -10   Flexion:  140     Left Knee Exam     Inspection   Effusion: present    Range of Motion   Extension:  -10   Flexion:  140     Tests   Stability   Lachman: abnormal   PCL-Posterior Drawer: normal (0 to 2mm)  MCL - Valgus: normal (0 to 2mm)  LCL - Varus: normal (0 to 2mm)  Pivot Shift: abnormal    Other   Sensation: normal    Comments:  7/9 Beighton Score   Unable to assess ligament fully due to hamstring firing, guarding, and     Vascular Exam       Left Pulses  Dorsalis Pedis:      2+  Posterior Tibial:      2+          Imaging:    Results  MRI Knee Without Contrast Left (Order [id number])     MRI Knee Without Contrast Left  Order: [id number]  Impression    Complete tear of the anterior cruciate ligament through its mid substance.    Lateral collateral ligament complex is macroscopically intact.  Edema posterior to the posterolateral joint capsule, popliteus muscle, and fibular head of the soleus muscle raises the possibility of a soft tissue injury along the posterolateral corner.    Clinical correlation requested.    Focal marrow edema in the lateral femoral condyle, posterolateral tibia, and posteromedial tibia, compatible with bone contusions.  At the posteromedial corner of the tibia, there is a small cortical interruption, compatible with a nondisplaced fracture  of the posteromedial corner of the tibial plateau, probably with slight depression.  If clinically indicated, this could be further assessed on CT.  Elsewhere, no discrete fracture identified.    High signal along the periphery of the medial meniscus near the junction with the body, at the site of the bone contusion.  A menisco-capsular tear is difficult to fully exclude in this setting.  Elsewhere, no medial or lateral meniscal tear detected.    Small joint effusion.        NOTIFICATION:  Notification was entered by Dr. Jackie Gifford on 7/24/2024 at 17:04 into the [employer] dashboard for communication to  the referring provider.      Jackie Gifford MD, electronically signed on Jul 24 2024 05:05PM  Narrative    EXAMINATION:  MR KNEE LEFT WO CONTRAST    INDICATION:  r/o ACL rupture    TECHNIQUE:  Multiplanar images of the knee were performed without the administration of intravenous contrast using a routine MRI knee protocol    COMPARISON:  Left knee radiographs dated 23 July 2024    FINDINGS:  Fluid: Moderate size joint effusion.  Mild nonspecific tenosynovitis.  Trace fluid in a Baker's cyst, small amounts of fluid and edema tracking along medial sided tendons into the calf.    Medial meniscus: Vertigo linear high signal along the periphery of the medial meniscus near the junction of the posterior horn and body is nonspecific (05:23).  A meniscal capsular tear is difficult to fully exclude in this setting.  Elsewhere, no  meniscal tear detected.  Lateral meniscus: No meniscal tear detected    Anterior cruciate ligament:  Complete tear through the midsubstance of the ACL (05:15).  Anterior translation of the tibia with respect to the distal femur (04:10)  Posterior cruciate ligament:  Normal.    Medial collateral ligament:  Normal.  Lateral collateral ligamentous complex:  Within normal limits.  Edema posterior posterolateral joint capsule and popliteus muscle and adjacent to the fibular head of the soleus muscle (03:25).  In the appropriate clinical setting, this could reflect an  injury to the posterolateral corner.    Extensor mechanism:  The patellar and distal quadriceps tendons are within normal limits.    Articular cartilage  Patellofemoral:  Grossly preserved.  Medial femorotibial: Grossly preserved.  Lateral femorotibial: Grossly preserved.    Bone marrow: Focal marrow edema in the anterior weight-bearing lateral femoral condyle and posterolateral tibia, compatible with pivot-shift mechanism bone contusions (5:9-10).  In addition, there is focal marrow edema in the posterior medial tibia.    High signal  extends through the tibial plateau cortex deep to the posterior horn adjacent to the posterior horn of the medial meniscus raising the possibility of a small tibial plateau fracture along the extreme posteromedial edge of the tibial plateau,  possibly with cortical width depression (05:21).    Muscles: Muscle bulk preserved.  No gross muscle edema.    Other: Mild patchy edema in the subcutaneous soft tissues along the anteromedial aspect of the knee and anterior to the inferior patella/proximal patellar tendon and proximal tibia.  Mild patchy edema in the popliteal fossa  Exam End: 07/24/24 13:45    Specimen Collected: 07/24/24 16:06 Last Resulted: 07/24/24 16:05   Received From: [facility]  Result Received: 08/14/24 13:24      R Knee 3 VW Left  Order: [id number]  Narrative    EXAMINATION:  XR KNEE 3 VW LEFT    INDICATION:  pain    COMPARISON:  None    FINDINGS:  Left knee: Upright AP, lateral and sunrise view of the left knee show no fracture or dislocation.  No radiopaque foreign body or destructive bone lesion.  No significant degenerative change.    Right knee: Limited single view upright AP of the left knee shows no significant degenerative change.      Carlos Corbett MD, electronically signed on Jul 23 2024 12:47PM  Exam End: 07/23/24 10:01    Specimen Collected: 07/23/24 11:48 Last Resulted: 07/23/24 11:47   Received From: [facility]  Result Received: 08/14/24 13:24          Relevant imaging results reviewed and interpreted by me, discussed with the patient and / or family today.  Agree with imaging read    Other Tests:     Lab Results   Component Value Date    LABPROT 10.7 08/21/2024    INR 1.0 08/21/2024    APTT 26.6 08/21/2024     08/21/2024    K 4.8 08/21/2024     08/21/2024    CO2 25 08/21/2024    GLU 84 08/21/2024    BUN 14 08/21/2024    CREATININE 0.9 08/21/2024    CALCIUM 10.1 08/21/2024    ANIONGAP 9 08/21/2024    EGFRNORACEVR >60.0 08/21/2024    WBC 6.32  08/21/2024    RBC 4.27 08/21/2024    HGB 14.2 08/21/2024    HCT 42.3 08/21/2024    MCV 99 (H) 08/21/2024    MCH 33.3 (H) 08/21/2024    MCHC 33.6 08/21/2024    RDW 12.5 08/21/2024     08/21/2024    MPV 9.9 08/21/2024    IMMGR 0.2 08/21/2024    GRAN 4.1 08/21/2024    GRAN 64.6 08/21/2024    IGABS 0.01 08/21/2024    LYMPH 1.9 08/21/2024    LYMPH 29.6 08/21/2024    MONO 0.3 08/21/2024    MONO 5.1 08/21/2024    EOS 0.0 08/21/2024    BASO 0.01 08/21/2024    NRBC 0 08/21/2024    EOSINOPHIL 0.3 08/21/2024    BASOPHIL 0.2 08/21/2024         Patient Instructions   Assessment:  Smooth Perez is a  24 y.o. 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    Sickle cell trait Yes    Peripheral tear of medial meniscus of left knee as current injury, subsequent encounter     Rupture of anterior cruciate ligament of left knee, subsequent encounter        Plan:  Labs reviewed. Discussed increased risk of blood clot post operatively due to patietns history of sickle cell trait. Will plan for stronger anti-coagulation medication post operatively.   Left knee arthroscopy, anterior cruciate ligmanet reconstruction with quadriceps tendon autograft, mensicus surgery as indicated, possible application of amnion graft to harvest site, any indicated procedures  PARTICIPANT AND CONSENTED FOR AMNION STUDY TRIAL  Discussed   Continue physical therapy with Iraj [facility]. Will need post op PT to start PO Day 2  Fit for TROM BRACE to be used after surgery  10 minutes were spent sizing, fitting, and educating regarding durable medical equipment by Dr. Sarkis Meza and his assistant under his direction today.  CPT 94245.  Timing of Common ACL Milestones (Pain, Walking, etc)  Functional Progression  after ACL Surgery (Running, etc)  ACL Reconstruction Information      Follow-up: SURGERY for pror sooner if there are any problems between now and then.    Leave Review:   Google: Leave Google Review  Healthgrades: Leave Healthgrades Review    After Hours Number: [phone]     Provider Note/Medical Decision Making:       I had a long discussion with the patient about treatment options, including operative and nonoperative treatments. We discussed pros and cons of each including risks pertinent to surgery including pain, infection, bleeding, damage to adjacent structures like nerves and blood vessels, failure to heal, need for future surgeries, stiffness, instability, loss of limb, anesthesia risks like stroke, blood clot, loss of life. We discussed the possibility of need for later hardware removal in the case that hardware was used. We discussed common and uncommon risks, and discussed patient specific factors that may increase the risks present with surgery. All questions were answered. The patient expressed understanding of the pros and cons of surgery and wanted to proceed with surgical treatment.  I had a long discussion with the patient and any present family regarding treatment options. I explained that although the ACL will usually not heal on its own, that some people are able to function well without an ACL. We discussed the continued risk of meniscal damage if the patient has repeat instability and buckling-type episodes. We discussed graft options and the differences between allograft and autograft, and the pros and cons of the different allograft and autograft types including, but not limited to, bone-patellar tendon-bone, quadriceps tendon, and hamstring. We discussed the expected recovery time of a minimum of 6-9 months after surgery before return to cutting or contact activity. We discussed that [employer] players take an average of 10-11 months before return to play.  We discussed that some studies  show a return to play prior to 9 months increases the risk of retear by a factor of 7.  We also discussed the need for strict adherence to the postoperative protocol and rehabilitation instructions.  We discussed the risk of arthrofibrosis and some of the precautions and postoperative rehabilitation specifics needed to lessen this risk.  We discussed the risk of retear and the risk of arthritis relative to the ACL injury, with and without surgery.  I had a long discussion with the patient and any present family regarding treatment options. I explained that although the meniscus will usually not heal on its own, not all meniscus tears need surgery. We discussed that many people will improve with therapy and nonoperative management. We discussed the blood supply of the meniscus and the fact that some patients and some tear patterns are more amenable to repair. We discussed that when performing operative treatment of meniscus tears, we attempt to repair tears that we think need to be repaired and have a good chance of healing. If we do perform a meniscectomy, we attempt to leave as much meniscus intact as possible. We discussed the implications of having a torn or deficient meniscus. We discussed the rehab, weight bearing, and postoperative differences between repair and resection, and we discussed postoperative expectations.  I discussed worrisome and red flag signs and symptoms with the patient. The patient expressed understanding and agreed to alert me immediately or to go to the emergency room if they experience any of these.   Treatment plan was developed with input from the patient/family, and they expressed understanding and agreement with the plan. All questions were answered today.          Sarkis Meza MD  Orthopaedic Surgery & Sports Medicine       Disclaimer: This note was prepared using a voice recognition system and is likely to have sound alike errors within the text.     IMoni,  acted as a scribe for Sarkis Meza MD for the duration of this office visit.

## 2024-09-05 ENCOUNTER — PATIENT MESSAGE (OUTPATIENT)
Dept: PREADMISSION TESTING | Facility: HOSPITAL | Age: 24
End: 2024-09-05
Payer: COMMERCIAL

## 2024-09-06 ENCOUNTER — PATIENT MESSAGE (OUTPATIENT)
Dept: PREADMISSION TESTING | Facility: HOSPITAL | Age: 24
End: 2024-09-06
Payer: COMMERCIAL

## 2024-09-09 ENCOUNTER — ANESTHESIA EVENT (OUTPATIENT)
Dept: SURGERY | Facility: HOSPITAL | Age: 24
End: 2024-09-09
Payer: COMMERCIAL

## 2024-09-09 ENCOUNTER — PATIENT MESSAGE (OUTPATIENT)
Dept: PREADMISSION TESTING | Facility: HOSPITAL | Age: 24
End: 2024-09-09
Payer: COMMERCIAL

## 2024-09-10 ENCOUNTER — HOSPITAL ENCOUNTER (OUTPATIENT)
Dept: RADIOLOGY | Facility: HOSPITAL | Age: 24
Discharge: HOME | End: 2024-09-10
Attending: ORTHOPAEDIC SURGERY | Admitting: ORTHOPAEDIC SURGERY
Payer: COMMERCIAL

## 2024-09-10 ENCOUNTER — ANESTHESIA (OUTPATIENT)
Dept: SURGERY | Facility: HOSPITAL | Age: 24
End: 2024-09-10
Payer: COMMERCIAL

## 2024-09-10 ENCOUNTER — HOSPITAL ENCOUNTER (OUTPATIENT)
Facility: HOSPITAL | Age: 24
Discharge: HOME | End: 2024-09-10
Attending: ORTHOPAEDIC SURGERY | Admitting: ORTHOPAEDIC SURGERY
Payer: COMMERCIAL

## 2024-09-10 VITALS
HEIGHT: 62 IN | WEIGHT: 126.88 LBS | TEMPERATURE: 99 F | BODY MASS INDEX: 23.35 KG/M2 | OXYGEN SATURATION: 99 % | RESPIRATION RATE: 26 BRPM | DIASTOLIC BLOOD PRESSURE: 65 MMHG | HEART RATE: 86 BPM | SYSTOLIC BLOOD PRESSURE: 108 MMHG

## 2024-09-10 DIAGNOSIS — S83.512D RUPTURE OF ANTERIOR CRUCIATE LIGAMENT OF LEFT KNEE, SUBSEQUENT ENCOUNTER: Primary | ICD-10-CM

## 2024-09-10 DIAGNOSIS — S83.512A LEFT ACL TEAR: ICD-10-CM

## 2024-09-10 LAB
B-HCG UR QL: NEGATIVE
CTP QC/QA: YES

## 2024-09-10 PROCEDURE — 25000003 PHARM REV CODE 250: Performed by: NURSE ANESTHETIST, CERTIFIED REGISTERED

## 2024-09-10 PROCEDURE — 37000009 HC ANESTHESIA EA ADD 15 MINS: Performed by: ORTHOPAEDIC SURGERY

## 2024-09-10 PROCEDURE — 36000710: Performed by: ORTHOPAEDIC SURGERY

## 2024-09-10 PROCEDURE — 71000033 HC RECOVERY, INTIAL HOUR: Performed by: ORTHOPAEDIC SURGERY

## 2024-09-10 PROCEDURE — 37000008 HC ANESTHESIA 1ST 15 MINUTES: Performed by: ORTHOPAEDIC SURGERY

## 2024-09-10 PROCEDURE — 63600175 PHARM REV CODE 636 W HCPCS: Performed by: NURSE ANESTHETIST, CERTIFIED REGISTERED

## 2024-09-10 PROCEDURE — 71000039 HC RECOVERY, EACH ADD'L HOUR: Performed by: ORTHOPAEDIC SURGERY

## 2024-09-10 PROCEDURE — C1713 ANCHOR/SCREW BN/BN,TIS/BN: HCPCS | Performed by: ORTHOPAEDIC SURGERY

## 2024-09-10 PROCEDURE — 27201423 OPTIME MED/SURG SUP & DEVICES STERILE SUPPLY: Performed by: ORTHOPAEDIC SURGERY

## 2024-09-10 PROCEDURE — 64447 NJX AA&/STRD FEMORAL NRV IMG: CPT | Performed by: ANESTHESIOLOGY

## 2024-09-10 PROCEDURE — 25000003 PHARM REV CODE 250: Performed by: ANESTHESIOLOGY

## 2024-09-10 PROCEDURE — 81025 URINE PREGNANCY TEST: CPT | Performed by: ORTHOPAEDIC SURGERY

## 2024-09-10 PROCEDURE — 63600175 PHARM REV CODE 636 W HCPCS: Performed by: ANESTHESIOLOGY

## 2024-09-10 PROCEDURE — 63600175 PHARM REV CODE 636 W HCPCS: Performed by: PHYSICIAN ASSISTANT

## 2024-09-10 PROCEDURE — 71000015 HC POSTOP RECOV 1ST HR: Performed by: ORTHOPAEDIC SURGERY

## 2024-09-10 PROCEDURE — 25000003 PHARM REV CODE 250: Performed by: PHYSICIAN ASSISTANT

## 2024-09-10 PROCEDURE — 64450 NJX AA&/STRD OTHER PN/BRANCH: CPT | Performed by: ORTHOPAEDIC SURGERY

## 2024-09-10 PROCEDURE — 73560 X-RAY EXAM OF KNEE 1 OR 2: CPT | Mod: TC,LT

## 2024-09-10 PROCEDURE — 36000711: Performed by: ORTHOPAEDIC SURGERY

## 2024-09-10 PROCEDURE — 63600175 PHARM REV CODE 636 W HCPCS: Performed by: ORTHOPAEDIC SURGERY

## 2024-09-10 PROCEDURE — 29888 ARTHRS AID ACL RPR/AGMNTJ: CPT | Mod: LT,,, | Performed by: ORTHOPAEDIC SURGERY

## 2024-09-10 DEVICE — SYS SWIVELOCK ANCH 4.75X19.1MM: Type: IMPLANTABLE DEVICE | Site: KNEE | Status: FUNCTIONAL

## 2024-09-10 DEVICE — IMPLANTABLE DEVICE: Type: IMPLANTABLE DEVICE | Site: KNEE | Status: FUNCTIONAL

## 2024-09-10 RX ORDER — LIDOCAINE HYDROCHLORIDE 10 MG/ML
INJECTION, SOLUTION EPIDURAL; INFILTRATION; INTRACAUDAL; PERINEURAL
Status: COMPLETED
Start: 2024-09-10 | End: 2024-09-10

## 2024-09-10 RX ORDER — ONDANSETRON 4 MG/1
4 TABLET, FILM COATED ORAL EVERY 8 HOURS PRN
Qty: 30 TABLET | Refills: 0 | Status: SHIPPED | OUTPATIENT
Start: 2024-09-10

## 2024-09-10 RX ORDER — ONDANSETRON HYDROCHLORIDE 2 MG/ML
4 INJECTION, SOLUTION INTRAVENOUS ONCE AS NEEDED
Status: DISCONTINUED | OUTPATIENT
Start: 2024-09-10 | End: 2024-09-10 | Stop reason: HOSPADM

## 2024-09-10 RX ORDER — LIDOCAINE HYDROCHLORIDE 10 MG/ML
INJECTION, SOLUTION INFILTRATION; PERINEURAL
Status: DISCONTINUED | OUTPATIENT
Start: 2024-09-10 | End: 2024-09-10 | Stop reason: HOSPADM

## 2024-09-10 RX ORDER — CHLORHEXIDINE GLUCONATE ORAL RINSE 1.2 MG/ML
10 SOLUTION DENTAL
Status: DISCONTINUED | OUTPATIENT
Start: 2024-09-10 | End: 2024-09-10 | Stop reason: HOSPADM

## 2024-09-10 RX ORDER — HYDROCODONE BITARTRATE AND ACETAMINOPHEN 5; 325 MG/1; MG/1
1 TABLET ORAL EVERY 4 HOURS PRN
Status: DISCONTINUED | OUTPATIENT
Start: 2024-09-10 | End: 2024-09-10 | Stop reason: HOSPADM

## 2024-09-10 RX ORDER — HYDROCODONE BITARTRATE AND ACETAMINOPHEN 5; 325 MG/1; MG/1
1 TABLET ORAL
Status: DISCONTINUED | OUTPATIENT
Start: 2024-09-10 | End: 2024-09-10 | Stop reason: HOSPADM

## 2024-09-10 RX ORDER — EPINEPHRINE 1 MG/ML
INJECTION, SOLUTION, CONCENTRATE INTRAVENOUS
Status: DISCONTINUED | OUTPATIENT
Start: 2024-09-10 | End: 2024-09-10 | Stop reason: HOSPADM

## 2024-09-10 RX ORDER — DIPHENHYDRAMINE HYDROCHLORIDE 50 MG/ML
25 INJECTION INTRAMUSCULAR; INTRAVENOUS EVERY 6 HOURS PRN
Status: DISCONTINUED | OUTPATIENT
Start: 2024-09-10 | End: 2024-09-10 | Stop reason: HOSPADM

## 2024-09-10 RX ORDER — MIDAZOLAM HYDROCHLORIDE 1 MG/ML
INJECTION INTRAMUSCULAR; INTRAVENOUS
Status: DISCONTINUED | OUTPATIENT
Start: 2024-09-10 | End: 2024-09-10

## 2024-09-10 RX ORDER — PROPOFOL 10 MG/ML
VIAL (ML) INTRAVENOUS
Status: DISCONTINUED | OUTPATIENT
Start: 2024-09-10 | End: 2024-09-10

## 2024-09-10 RX ORDER — DEXMEDETOMIDINE HYDROCHLORIDE 100 UG/ML
INJECTION, SOLUTION INTRAVENOUS
Status: DISCONTINUED | OUTPATIENT
Start: 2024-09-10 | End: 2024-09-10

## 2024-09-10 RX ORDER — EPINEPHRINE 1 MG/ML
INJECTION, SOLUTION, CONCENTRATE INTRAVENOUS
Status: DISCONTINUED
Start: 2024-09-10 | End: 2024-09-10 | Stop reason: HOSPADM

## 2024-09-10 RX ORDER — BUPIVACAINE HYDROCHLORIDE 2.5 MG/ML
INJECTION, SOLUTION EPIDURAL; INFILTRATION; INTRACAUDAL
Status: DISCONTINUED | OUTPATIENT
Start: 2024-09-10 | End: 2024-09-10 | Stop reason: HOSPADM

## 2024-09-10 RX ORDER — FENTANYL CITRATE 50 UG/ML
25 INJECTION, SOLUTION INTRAMUSCULAR; INTRAVENOUS EVERY 5 MIN PRN
Status: COMPLETED | OUTPATIENT
Start: 2024-09-10 | End: 2024-09-10

## 2024-09-10 RX ORDER — CEPHALEXIN 500 MG/1
500 CAPSULE ORAL EVERY 12 HOURS
Qty: 10 CAPSULE | Refills: 0 | Status: SHIPPED | OUTPATIENT
Start: 2024-09-10 | End: 2024-09-15

## 2024-09-10 RX ORDER — DOCUSATE SODIUM 100 MG/1
100 CAPSULE, LIQUID FILLED ORAL 2 TIMES DAILY
Qty: 30 CAPSULE | Refills: 0 | Status: SHIPPED | OUTPATIENT
Start: 2024-09-10

## 2024-09-10 RX ORDER — LIDOCAINE HYDROCHLORIDE 10 MG/ML
INJECTION, SOLUTION EPIDURAL; INFILTRATION; INTRACAUDAL; PERINEURAL
Status: COMPLETED | OUTPATIENT
Start: 2024-09-10 | End: 2024-09-10

## 2024-09-10 RX ORDER — ALBUTEROL SULFATE 0.83 MG/ML
2.5 SOLUTION RESPIRATORY (INHALATION) EVERY 4 HOURS PRN
Status: DISCONTINUED | OUTPATIENT
Start: 2024-09-10 | End: 2024-09-10 | Stop reason: HOSPADM

## 2024-09-10 RX ORDER — OXYCODONE AND ACETAMINOPHEN 5; 325 MG/1; MG/1
1 TABLET ORAL
Qty: 45 TABLET | Refills: 0 | Status: SHIPPED | OUTPATIENT
Start: 2024-09-10 | End: 2024-09-18

## 2024-09-10 RX ORDER — DEXAMETHASONE SODIUM PHOSPHATE 4 MG/ML
INJECTION, SOLUTION INTRA-ARTICULAR; INTRALESIONAL; INTRAMUSCULAR; INTRAVENOUS; SOFT TISSUE
Status: DISCONTINUED | OUTPATIENT
Start: 2024-09-10 | End: 2024-09-10

## 2024-09-10 RX ORDER — LIDOCAINE HYDROCHLORIDE 20 MG/ML
INJECTION, SOLUTION EPIDURAL; INFILTRATION; INTRACAUDAL; PERINEURAL
Status: DISCONTINUED | OUTPATIENT
Start: 2024-09-10 | End: 2024-09-10

## 2024-09-10 RX ORDER — BUPIVACAINE HYDROCHLORIDE 2.5 MG/ML
INJECTION, SOLUTION EPIDURAL; INFILTRATION; INTRACAUDAL
Status: DISCONTINUED
Start: 2024-09-10 | End: 2024-09-10 | Stop reason: HOSPADM

## 2024-09-10 RX ORDER — SODIUM CHLORIDE 9 MG/ML
INJECTION, SOLUTION INTRAVENOUS CONTINUOUS
Status: DISCONTINUED | OUTPATIENT
Start: 2024-09-10 | End: 2024-09-10 | Stop reason: HOSPADM

## 2024-09-10 RX ORDER — FENTANYL CITRATE 50 UG/ML
INJECTION, SOLUTION INTRAMUSCULAR; INTRAVENOUS
Status: DISCONTINUED | OUTPATIENT
Start: 2024-09-10 | End: 2024-09-10

## 2024-09-10 RX ORDER — GLUCAGON 1 MG
1 KIT INJECTION
Status: DISCONTINUED | OUTPATIENT
Start: 2024-09-10 | End: 2024-09-10 | Stop reason: HOSPADM

## 2024-09-10 RX ORDER — HYDROMORPHONE HYDROCHLORIDE 2 MG/ML
INJECTION, SOLUTION INTRAMUSCULAR; INTRAVENOUS; SUBCUTANEOUS
Status: DISCONTINUED | OUTPATIENT
Start: 2024-09-10 | End: 2024-09-10

## 2024-09-10 RX ORDER — MEPERIDINE HYDROCHLORIDE 25 MG/ML
12.5 INJECTION INTRAMUSCULAR; INTRAVENOUS; SUBCUTANEOUS ONCE
Status: COMPLETED | OUTPATIENT
Start: 2024-09-10 | End: 2024-09-10

## 2024-09-10 RX ORDER — OXYCODONE HYDROCHLORIDE 5 MG/1
5 TABLET ORAL EVERY 4 HOURS PRN
Qty: 10 TABLET | Refills: 0 | Status: SHIPPED | OUTPATIENT
Start: 2024-09-10 | End: 2024-09-18

## 2024-09-10 RX ORDER — ROPIVACAINE HYDROCHLORIDE 5 MG/ML
INJECTION, SOLUTION EPIDURAL; INFILTRATION; PERINEURAL
Status: COMPLETED | OUTPATIENT
Start: 2024-09-10 | End: 2024-09-10

## 2024-09-10 RX ORDER — CHLORHEXIDINE GLUCONATE ORAL RINSE 1.2 MG/ML
10 SOLUTION DENTAL 2 TIMES DAILY
Status: DISCONTINUED | OUTPATIENT
Start: 2024-09-10 | End: 2024-09-10 | Stop reason: HOSPADM

## 2024-09-10 RX ORDER — ONDANSETRON HYDROCHLORIDE 2 MG/ML
INJECTION, SOLUTION INTRAVENOUS
Status: DISCONTINUED | OUTPATIENT
Start: 2024-09-10 | End: 2024-09-10

## 2024-09-10 RX ADMIN — CEFAZOLIN 2 G: 2 INJECTION, POWDER, FOR SOLUTION INTRAMUSCULAR; INTRAVENOUS at 09:09

## 2024-09-10 RX ADMIN — PROPOFOL 150 MG: 10 INJECTION, EMULSION INTRAVENOUS at 09:09

## 2024-09-10 RX ADMIN — SODIUM CHLORIDE, POTASSIUM CHLORIDE, SODIUM LACTATE AND CALCIUM CHLORIDE: 600; 310; 30; 20 INJECTION, SOLUTION INTRAVENOUS at 09:09

## 2024-09-10 RX ADMIN — FENTANYL CITRATE 25 MCG: 50 INJECTION, SOLUTION INTRAMUSCULAR; INTRAVENOUS at 11:09

## 2024-09-10 RX ADMIN — LIDOCAINE HYDROCHLORIDE 2 MG: 10 SOLUTION INTRAVENOUS at 09:09

## 2024-09-10 RX ADMIN — PROPOFOL 40 MG: 10 INJECTION, EMULSION INTRAVENOUS at 11:09

## 2024-09-10 RX ADMIN — HYDROCODONE BITARTRATE AND ACETAMINOPHEN 1 TABLET: 5; 325 TABLET ORAL at 12:09

## 2024-09-10 RX ADMIN — HYDROMORPHONE HYDROCHLORIDE 0.5 MG: 2 INJECTION INTRAMUSCULAR; INTRAVENOUS; SUBCUTANEOUS at 12:09

## 2024-09-10 RX ADMIN — FENTANYL CITRATE 25 MCG: 50 INJECTION, SOLUTION INTRAMUSCULAR; INTRAVENOUS at 12:09

## 2024-09-10 RX ADMIN — FENTANYL CITRATE 50 MCG: 50 INJECTION, SOLUTION INTRAMUSCULAR; INTRAVENOUS at 09:09

## 2024-09-10 RX ADMIN — DEXMEDETOMIDINE HYDROCHLORIDE 4 MCG: 100 INJECTION, SOLUTION INTRAVENOUS at 11:09

## 2024-09-10 RX ADMIN — LIDOCAINE HYDROCHLORIDE 50 MG: 20 INJECTION, SOLUTION EPIDURAL; INFILTRATION; INTRACAUDAL; PERINEURAL at 09:09

## 2024-09-10 RX ADMIN — ROPIVACAINE HYDROCHLORIDE 20 ML: 5 INJECTION, SOLUTION EPIDURAL; INFILTRATION; PERINEURAL at 09:09

## 2024-09-10 RX ADMIN — ONDANSETRON 4 MG: 2 INJECTION INTRAMUSCULAR; INTRAVENOUS at 11:09

## 2024-09-10 RX ADMIN — SODIUM CHLORIDE, POTASSIUM CHLORIDE, SODIUM LACTATE AND CALCIUM CHLORIDE: 600; 310; 30; 20 INJECTION, SOLUTION INTRAVENOUS at 10:09

## 2024-09-10 RX ADMIN — MEPERIDINE HYDROCHLORIDE 12.5 MG: 25 INJECTION INTRAMUSCULAR; INTRAVENOUS; SUBCUTANEOUS at 12:09

## 2024-09-10 RX ADMIN — DEXAMETHASONE SODIUM PHOSPHATE 4 MG: 4 INJECTION, SOLUTION INTRA-ARTICULAR; INTRALESIONAL; INTRAMUSCULAR; INTRAVENOUS; SOFT TISSUE at 09:09

## 2024-09-10 RX ADMIN — FENTANYL CITRATE 25 MCG: 50 INJECTION, SOLUTION INTRAMUSCULAR; INTRAVENOUS at 10:09

## 2024-09-10 RX ADMIN — MIDAZOLAM 2 MG: 1 INJECTION INTRAMUSCULAR; INTRAVENOUS at 09:09

## 2024-09-10 RX ADMIN — DEXMEDETOMIDINE HYDROCHLORIDE 4 MCG: 100 INJECTION, SOLUTION INTRAVENOUS at 10:09

## 2024-09-10 RX ADMIN — DEXMEDETOMIDINE HYDROCHLORIDE 4 MCG: 100 INJECTION, SOLUTION INTRAVENOUS at 09:09

## 2024-09-10 NOTE — DISCHARGE INSTRUCTIONS
Knee Surgery Post-Operative Instructions     Sarkis Meza MD   [address] [place] [address]  Ph: [phone] Fax: [phone]  [email or web address]    Moni ButtSMA  [phone] (cell)  [email or web address]    Dr. Sarkis Meza   [phone] (cell)  [email or web address]      After you get home, apply ice to your knee but keep the bandages dry. You may apply ice?for 15-20 minutes every 1-2 hours for first week. Ice helps to reduce pain and?swelling. Never apply ice directly to the skin. If you are using a CryoCuff/PolarIce, it should be ice cold for no more than 15-20 minutes every 1-2 hours.     Elevate your leg on 2-3 pillows or rolled up towels placed under the heel so that the heel?is elevated higher than your knee. This will help reduce swelling and achieve full?extension of the knee.     It is important to get up and move around after your surgery. It's good for your lungs after anesthesia, and also good for your circulation to help prevent blood clots from developing.  However, too much walking will cause the knee to swell and hurt.     After 72 hours, you can remove the ACE wrap and bandages. You should then place new gauze/bandages and ACE wrap each day for 2 weeks.     You may shower, but the incisions, ACE bandages, and Brace must not get wet until 72 hours after surgery and only if there is no drainage at all from the incisions. Do not soak the knee under water for 2 weeks.     Weight-Bearing Status: You are to be (weight bearing/ non-weight bearing) on your operative leg.?     Range of motion: As tolerated     Take the pain medicine as needed. You may take up to 2 tablets every 4-6 hours if?needed. As the pain subsides try to increase the time between doses.      Your first post-operative check-up is 10-14 days from the?day of surgery. _________________________       It is normal to have some discomfort and swelling, as well as  a small amount of blood-tinged drainage, following surgery. If this becomes severe, or if you develop a fever greater than or equal to?101 degrees, calf pain, or shortness of breath or chest pain, please call immediately. If?you have questions or problems, call the office at [phone].     NORMAL SENSATIONS AND FINDINGS AFTER SURGERY   Shin pain   Knee swelling and warmth up to 2 weeks   Small amounts of bloody drainage   Numbness around the incision area   Soreness and swelling in the back of the knee   Bruising to the lower leg   Lower leg swelling, including the ankle - if this occurs elevate the leg above the heart?and apply ice to the swollen areas.   Numbness to the foot if you had a nerve block - will resolve within a few days   Low grade temperature less than 101.5 - if this occurs drink plenty of fluids and cough?and deep breathe (take 10 breaths, on the last hold for a second then forcefully cough a?few times). A low grade temp is normal for a week after surgery   Small amount of redness to the area where the sutures insert in the skin  Low back discomfort due to the epidural / spinal anesthesia apply a heating pad as?needed      NOTIFY OUR OFFICE IMMEDIATELY AT [phone] IF ANY OF THE FOLLOWING SIGNS OR SYMPTOMS OCCUR:   Chest pain or shortness of breath   Change is noted to your incision (i.e. increased redness or drainage)   Numbness of your foot if you didn't have a nerve block   Sharp pains in the back of your hip, thigh, or calf   Temperature greater than 101.5 degrees   Fever, chills, nausea, vomiting or diarrhea   Stitches loosen or fall out and incisions open up   Thick, foul-smelling drainage (yellow or greenish)   Increased pain which is not relieved by medications or other measures mentioned above       Knee & Quad Exercise Instructions     Sarkis Meza MD   [address] [place] [address]  Ph: [phone] Fax: [phone]       Exercises listed are to be performed  by the patient following surgery. Perform sets of 10 repetitions, 4 times per day.        Heel Slides        Lie flat or sit with your leg straight. Slide your heel toward your hip. Try to get your knee bent to a 90° angle. Slide your heel back so your leg is straight then relax.       Knee Extension (Lying Down)      While lying down, rest your ankle on a towel roll so that your knee and calf are not touching the floor. Allow gravity to straighten your knee. Maintain this position for up to 10 minutes.       Knee Extension (Sitting in a Chair)      While sitting in a chair, prop your heel on another chair so that there is nothing behind your calf or knee. Allow gravity to straighten your knee. Maintain this position for up to 10 minute       Patellar Mobilization      This exercise is done by simply pushing the patella up and down and side to side and holding that position. Movement of the patella is essential when restoring range of motion. If the patella cannot move within the femoral groove, then the knee cannot bend and extend.?         Quadriceps Isometrics (Quad Sets)        Lie flat or sit with your surgical leg straight. Tighten the muscle in the front of your thigh as much as you can, pushing the back or your knee flat against the floor. Hold this tight for 5 seconds then relax.        Straight Leg Raises (SLR)      Lie flat or sit with your leg straight and your knee brace on (if you have one). You may have your non-operative knee bent slightly for comfort. Perform a Quad set (as above) and flex your toes straight up. Lift your heel off of the floor and hold for at least 5 seconds. Keep your thigh muscle as tight as you can and lower your heel back down then relax.       Seated Knee Flexion        Sit with your legs dangling over the bed. Relax your leg allowing gravity to bend your knee. You may use your non-operative leg to gently push your operative leg into more of a bend. Maintain this position for  up to 10 minutes.        Calf Pumps         Point and flex your toes to tighten your calf muscles.

## 2024-09-10 NOTE — PLAN OF CARE
Left adductor canal peripheral nerve block completed per anesthesia at bedside at this time. Patient tolerated well. VSS. Continuous cardiac and SPO2 monitoring in place.

## 2024-09-10 NOTE — TRANSFER OF CARE
"Anesthesia Transfer of Care Note    Patient: Smooth Perez    Procedure(s) Performed: Procedure(s) (LRB):  AMNION TRIAL, RECONSTRUCTION, KNEE, ACL, ARTHROSCOPIC (Left)  LYSIS, ADHESIONS, KNEE, ARTHROSCOPIC (Left)    Patient location: PACU    Anesthesia Type: general    Transport from OR: Transported from OR on room air with adequate spontaneous ventilation    Post pain: adequate analgesia    Post assessment: no apparent anesthetic complications    Post vital signs: stable    Level of consciousness: awake    Nausea/Vomiting: no nausea/vomiting    Complications: none    Transfer of care protocol was followed      Last vitals: Visit Vitals  /66 (BP Location: Right arm, Patient Position: Lying)   Pulse 71   Temp 36.8 °C (98.2 °F) (Temporal)   Resp 20   Ht 5' 2" (1.575 m)   Wt 57.6 kg (126 lb 14 oz)   LMP 09/08/2024 (Exact Date)   SpO2 97%   Breastfeeding No   BMI 23.21 kg/m²     "

## 2024-09-10 NOTE — NURSING TRANSFER
Assumed care of patient from LOLITA Montez. Patient vitals stable, resting comfortably in bed with no complaints and call light within reach. Plan of care and discharge criteria reviewed with patient/family. Will continue to monitor

## 2024-09-10 NOTE — BRIEF OP NOTE
"[facility]  Brief Operative Note    Surgery Date: 9/10/2024     Surgeons and Role:     * Sarkis Meza MD - Primary    Assisting Surgeon: None    Pre-op Diagnosis:  Rupture of anterior cruciate ligament of left knee, initial encounter [S83.512A]  Peripheral tear of medial meniscus of left knee as current injury, initial encounter [S83.222A]    Post-op Diagnosis:  Post-Op Diagnosis Codes:     * Rupture of anterior cruciate ligament of left knee, initial encounter [S83.512A]     * Peripheral tear of medial meniscus of left knee as current injury, initial encounter [S83.222A]    Procedure(s) (LRB):  AMNION TRIAL, RECONSTRUCTION, KNEE, ACL, ARTHROSCOPIC (Left)  LYSIS, ADHESIONS, KNEE, ARTHROSCOPIC (Left)    Anesthesia: General    Operative Findings: Left knee scope, Left knee ACL reconstruction with quad tendon autograft, and lysis of adhesions.     Estimated Blood Loss: * No values recorded between 9/10/2024 10:12 AM and 9/10/2024 12:09 PM *         Specimens:   Specimen (24h ago, onward)      None              Discharge Note    OUTCOME: Patient tolerated treatment/procedure well without complication and is now ready for discharge.    DISPOSITION: Home or Self Care    FINAL DIAGNOSIS:  left knee acl     FOLLOWUP: In clinic    DISCHARGE INSTRUCTIONS:    Discharge Procedure Orders   CRUTCHES FOR HOME USE     Order Specific Question Answer Comments   Type: Axillary    Height: 5' 2" (1.575 m)    Weight: 57.6 kg (126 lb 14 oz)    Length of need (1-99 months): 1 week     Diet general     Call MD for:  temperature >100.4     Call MD for:  persistent nausea and vomiting     Call MD for:  severe uncontrolled pain     Call MD for:  difficulty breathing, headache or visual disturbances     Call MD for:  redness, tenderness, or signs of infection (pain, swelling, redness, odor or green/yellow discharge around incision site)     Call MD for:  hives     Call MD for:  persistent dizziness or light-headedness "     Call MD for:  extreme fatigue     Other restrictions (specify):   Order Comments: Range of motion as tolerated     Change dressing (specify)   Order Comments: Dressing change: Dressing change at the first physical therapy appointment, or thursday     Weight bearing as tolerated

## 2024-09-10 NOTE — ANESTHESIA PROCEDURE NOTES
Peripheral Block    Patient location during procedure: pre-op   Block not for primary anesthetic.  Reason for block: at surgeon's request and post-op pain management   Post-op Pain Location: Left knee   Start time: 9/10/2024 9:36 AM  Timeout: 9/10/2024 9:34 AM   End time: 9/10/2024 9:38 AM    Staffing  Authorizing Provider: Wen Melendez MD  Performing Provider: Wen Melendez MD    Staffing  Other anesthesia staff: Raina Soler CRNA  Performed by: Wen Melendez MD  Authorized by: Wen Melendez MD    Preanesthetic Checklist  Completed: patient identified, IV checked, site marked, risks and benefits discussed, surgical consent, monitors and equipment checked, pre-op evaluation and timeout performed  Peripheral Block  Patient position: supine  Prep: ChloraPrep  Patient monitoring: heart rate, cardiac monitor, continuous pulse ox, continuous capnometry and frequent blood pressure checks  Block type: adductor canal  Laterality: left  Injection technique: single shot  Needle  Needle type: Stimuplex   Needle gauge: 21 G  Needle length: 4 in  Needle localization: anatomical landmarks and ultrasound guidance   -ultrasound image captured on disc.  Assessment  Injection assessment: negative aspiration, negative parasthesia and local visualized surrounding nerve  Paresthesia pain: none  Heart rate change: no  Slow fractionated injection: yes  Pain Tolerance: comfortable throughout block and no complaints  Medications:    Medications: ropivacaine (NAROPIN) injection 0.5% - Perineural   20 mL - 9/10/2024 9:38:00 AM  lidocaine (PF) injection 1% - Other   2 mg - 9/10/2024 9:36:00 AM    Additional Notes  VSS.  [facility] RN monitoring vitals throughout procedure.  Patient tolerated procedure well.

## 2024-09-10 NOTE — OP NOTE
[facility] -  Orthopaedic Surgery & Sports Medicine  [facility] Periop Services    OPERATIVE NOTE    Procedure Date: 9/10/2024     Preoperative Diagnosis:  Rupture of anterior cruciate ligament of left knee, initial encounter [S83.512A]  Peripheral tear of medial meniscus of left knee as current injury, initial encounter [S83.222A]    Postoperative Diagnosis:  Left knee ACL tear   Healed meniscocapsular injury to medial meniscus  Adhesions of left knee    Surgeon: Sarkis Meza MD    Assistant Surgeon: Moni Butt SMA OTC ATC and Trinh Yun PA-C. Skilled assistance was medically necessary for this case to help with extremity positioning, soft tissue retraction, and instrumentation.    Procedure Performed:  Procedure(s) (LRB):  AMNION TRIAL, RECONSTRUCTION, KNEE, ACL, ARTHROSCOPIC (Left)  LYSIS, ADHESIONS, KNEE, ARTHROSCOPIC (Left)         Graft Used: quad tendon autograft  Graft Size: 9.0mm x 70mm    Femoral Tunnel Technique: flipcutter, retrograde reaming  Femoral Tunnel Length: 35mm/25mm  Femoral Tunnel Fixation: Arthrex Tight rope with knots tied over the top     Tibial Tunnel Technique: sequential reaming (5-7-9mm)  Tibial Tunnel Length: 45mm  Tibial Tunnel Fixation: button backed up by 4.75 mm bio composite swivelock    Anesthesia: General     Block: Adductor Canal     Fluids: Per Anesthesia Record    UOP: Per Anesthesia Record    Blood Loss: * No values recorded between 9/10/2024 10:12 AM and 9/10/2024 12:01 PM *    Implants:   Implant Name Type Inv. Item Serial No.  Lot No. LRB No. Used Action   SYS SWIVELOCK ANCH 4.75X19.1MM - [id number]  SYS SWIVELOCK ANCH 4.75X19.1MM  [employer] [id number] Left 1 Implanted   Fiber Tag Tight Rope II Implant with Attached Needle    [employer] [id number] Left 1 Implanted   FiberTag TightRope II ofr InternalBrace Technique with FlipCutter III Drill and FiberSnare Suture     [id number] Left 1 Implanted   SYS FIX TIGHTROP ROUND 11MM - [id number]  SYS FIX  TIGHTROP ROUND 11MM  [employer] [id number] Left 1 Implanted       Specimens:   Specimen (24h ago, onward)      None             Drains:     Tourniquet Time: 21 min left lower extremity    Complications: No    Fluoro/C-arm utilized during the case: mini-c used to verify button placement    Preoperative Exam Under Anesthesia Findings:   ROM: -7 to 140  Lachman: 2B   Pivot Shift: 2+   Anterior Drawer: 2+   Posterior Drawer: Negative  Varus @ 0: Stable  Varus @ 30: Stable   Dial Test @ 0: Negative  Dial Test @ 30: Negative  Valgus @ 0: Stable  Valgus @ 30: Stable    Intraoperative Findings:   ACL: Grade 3 tear, midsubstance  PCL: Intact  Medial Meniscus: previously seen possible RAMP lesion healed  Lateral Meniscus: normal  MFC: normal  MTP: normal  LFC: normal  LTP: normal  Patella: normal  Trochlea: normal  Gutters: ahesions in notch and gutters from ACL tear    Indications for Procedure and Brief History: This is a 24 year old active female who sustained a knee injury on 7/20/24 playing football. A subsequent MRI demonstrated a full thickness tear of the ACL. I had a long discussion with the patient about treatment options. We discussed operative and non-operative options. We discussed the risks of surgery at length, including but not limited to pain, infection, bleeding, damage to adjacent structures such as nerves and blood vessels, failure to heal, stiffness, laxity, need for more surgery, stroke, blood clot, loss of life, loss of limb, need for removal of any implants used, anesthesia risks, breathing problems, and heart problems. We talked about common and uncommon risks. We discussed risks that were higher specific to the patient. I explained that although the ACL will usually not heal on its own, that some people are able to function well without an ACL. We discussed the continued risk of meniscal damage if the patient has repeat instability and buckling-type episodes. We discussed graft options and the differences  between allograft and autograft, and the pros and cons of the different allograft and autograft types including, but not limited to, bone-patellar tendon-bone, quadriceps tendon, and hamstring. We discussed the expected recovery time of a minimum of 6-9 months after surgery before return to cutting or contact activity. We discussed that [employer] players take an average of 10-11 months before return to play.  We discussed that some studies show a return to play prior to 9 months increases the risk of retear by a factor of 7.  We also discussed the need for strict adherence to the postoperative protocol and rehabilitation instructions.  We discussed the risk of arthrofibrosis and some of the precautions and postoperative rehabilitation specifics needed to lessen this risk.  We discussed the risk of retear and the risk of arthritis relative to the ACL injury, with and without surgery. They expressed understanding of the risks and opted to proceed with surgical management.    Description of Procedure: I met the the patient in the preoperative holding area. I identified, confirmed, and marked the operative extremity. All questions were answered. The patient was then taken back to the operating room and transferred to the operative table. The patient was placed in the supine position. All bony prominences were padded. A foot pad was placed to assist positioning at 90 degrees and at hyperflexion. Anesthesia was induced without complication. A tourniquet with adequate padding was placed at the proximal thigh. The operative extremity was then prepped and draped in the standard sterile fashion with a chlorhexidine and alcohol solution. A timeout was performed to ensure we had the proper patient, proper operative site, and were performing the proper procedure. All members of the operative team were in agreement with this. Intravenous antibiotics were administered within 60 minutes prior to the incision.     I began the procedure by  performing the quadriceps harvest. I exsanguinated the limb with an Esmarch bandage and elevated the tourniquet to 250mm Hg. I then made a longitudinal 2cm incision just proximal to the patella. I incised sharply through skin and subcutaneous fat, and then identified the paratenon, which I also incised through. I used an elevator with a damp raytech to bluntly sweep fatty tissue off the quad tendon for exposure. I paced a speculum retractor for better visualization and used the arthroscope endoscopically. I identified the longest portion of the tendon, which was approximately 60% the medial to lateral width. I chose a 9mm wide double blade knife which I placed proximally at the peak of the tendon and viewed endoscopically. I then incised from proximal to distal with these parallel blades through the tendon, but not full thickness. Distally, I transitioned first to a long handle #10 scalpel to complete and slightly deepen the tendon cuts, and I then used a 15 blade a the very distal portion to the patella. I marked out the same with on the proximal patella with a Bovie to help subperiosteally elevated this distal quad tendon off the patella. I grasped this tendon with an Shandra clamp and used and #15 scalpel and a #9 scalpel to elevate a partial thickness graft from distal to proximal. I made sure to leave a good cuff of tendinous tissue medially and laterally, and we harvested the proximal tendon with an Arthrex cigar cutter, making sure not to harvest any muscle tissue. We then closed the defect with 0-vicryl suture, taking care not to overtension. The graft was then taken to the back table and prepared by my assistant.    I then moved on to the diagnostic arthroscopy.  I made my initial portal with a 11 knife anterolaterally. I did this with a high and tight portal against the patellar tendon inferior pole of the patella. I then made two anteromedial portals utilizing a spinal needle for localization under  arthroscopic visualization. I made the first portal adjacent to the medial border of the patellar tendon and just above the meniscus. The second portal I made in similar fashion but more medially. I then gently resected excess fat pad with an arthroscopic shaver only as needed for visualization. I then performed a standard diagnostic arthroscopy, examining all compartments and intra-articular spaces of the knee. The key findings are listed above. I switched between all 3 portals for viewing and instrumentation throughout the case as needed, and switched between and 30 degree and 70 degree scope as needed.      I then moved on to the ACL reconstruction portion of the case. I prepared the femoral notch by identifying the anatomic attachment site of the ACL utilizing bony and soft tissue landmarks. I marked this site and visualized it from all portals to confirm. We then identified the tibial insertion of the ACL utilizing soft tissue and bony landmarks. We preserved some of the tibial remnant, and we marked out the insertion. We then reamed our femoral tunnel, while viewing arthroscopically.  We did this by using the Arthrex femoral guide centered over the femoral ACL attachment while viewing from the anteromedial portal.  We then positioned the femoral guide outside of the knee angle up 30-45 degrees from horizontal and with the guide set at 105-110 degrees in the coronal plane relative to the femur.  We marked the guide insertion site on the anterolateral thigh and made a small incision through the skin and the ITB band careful to avoid the LCL and any neurovascular structures.  We then advanced the ratcheting cannula down to the bone surface, noted the length of the tunnel, and advanced a guide pin from the anterolateral femur through the intra-articular ACL attachment site while viewing arthroscopically.  Once we confirmed that the pin was centered in the anatomic femoral insertion of the ACL we over-reamed this  with a 4.0 mm cannulated Reamer while holding the pin tip with a grasper and we then inserted the flip cutter through the reamed tunnel.  We did this to allow us more precision with femoral tunnel placement.  We then scored the edge of the femoral tunnel while viewing arthroscopically from the anteromedial portal and once confirmed ideal tunnel placement retrograde reamed the tunnel to the desired depth. We took care to suction out all excess bone fragments from the reaming, and we then checked our tunnel viewing inside the tunnel with the scope to make sure there wasn't excess bone left behind and that the positioning of the tunnel was ideal. We then passed a pull suture through the tunnel, and moved on to the tibial tunnel. We used the tibial guide and placed it intra-articularly right at the center of the tibial insertion. We placed the other end of the guide on the anteromedial tibia, where we had made a skin incision just medial and distal to the tibial tubercle. Once positioned appropriately, we reamed the tibial tunnel under arthroscopic visualization and suctioned out excess bone fragments.  We did this in a sequential fashion while grasping the pin with a grasper arthroscopically and fine tuning the placement of each subsequent Reamer passage.  We then passed a pull suture through the tunnel. At this point, the graft had been prepared on the back table and was ready for passing. We all changed gloves, covered the skin with fresh sterile surgical towels, and pulled the femoral side of the graft in through the tibial tunnel through the remnant, and we advanced the endobutton out the far side of the socket on the anterolateral femur, and flipped the button. We made sure that it was positioned directly on bone and not on soft tissue. We then tensioned the tightrope to pull the graft into place, and docked it into the socket in the femur, although we left 2-3mm extra for later retensioning. We then pulled the  tibial side of the graft down into the tibial tunnel. We cycled the knee 15 times through flexion and extension, while holding tension on the graft. We also made sure that there was not impingement of the graft in the notch. We then tension and fixed the graft in 15 degrees of knee flexion, while we held a posterior drawer, slight axial load, and neutral rotation force on the knee. We then retensioned the tightrope on the femoral side while holding this position. We then checked the stability of the knee and noted that the patient now had a grade 1A lachman, negative negative pivot shift, and good varus/valgus stability.  We also visualized arthroscopically to ensure no impingement in the notch which there was not.    Meniscus Surgery: none    Additional Surgical Procedures: We utilized an arthroscopic shaver and biter to debride back adhesions from the torn ACL which was adhesed to the notch and to the PCL.    We then repeated our diagnostic arthroscopy to make sure there was no surgical debris, and to reexamine the knee. We then suctioned out excess arthroscopic fluid, and we performed a layered closure using 0-vicryl in the deep tissue and fascia, 2-0 vicryl in the subcutaneous tissue, and prolene in the skin. We placed sterile dressings over the wound. All sponge, instrument, and needle counts were correct x 2 at the end of the case. I was present and performed all key portions of the procedure.  The patient was awoken from anesthesia transported to the PACU in stable condition. The patient was examined postoperatively and the limb was warm and well perfused with appropriate neurovascular status relative to preoperative status and block status.     Condition: Good    Disposition: PACU - hemodynamically stable.    Attestation: I was present and scrubbed for the entire procedure.    Postoperative Plan:  ACL recon protocol  Weight bearing: as tolerated  Range of motion: as tolerated  Antibiotics: 5 days of PO  prophylactic antibiotics  DVT Ppx: Eliquis  PT/OT: Start POD#3  Follow-up: 10-14 days with AP/Lateral of operative knee (non-weightbearing)    Sarkis Meza MD  Orthopaedic Surgery & Sports Medicine

## 2024-09-10 NOTE — PLAN OF CARE
Reviewed and completed all PACU orders. I encouraged questions, answered them thoroughly, and evaluated my instructions via teach-back method. I have disconnected patient from monitoring equipment and prepared them for the next phase of care  Patient has met all PACU discharge criteria at this point. Patient and family agree with the plan of care. Report given to NURSE Vázquez.

## 2024-09-10 NOTE — DISCHARGE SUMMARY
"[facility]  Discharge Note  Short Stay    Procedure(s) (LRB):  AMNION TRIAL, RECONSTRUCTION, KNEE, ACL, ARTHROSCOPIC (Left)  LYSIS, ADHESIONS, KNEE, ARTHROSCOPIC (Left)      OUTCOME: Patient tolerated treatment/procedure well without complication and is now ready for discharge.    DISPOSITION: Home or Self Care    FINAL DIAGNOSIS:  Left knee pain    FOLLOWUP: In clinic    DISCHARGE INSTRUCTIONS:    Discharge Procedure Orders   CRUTCHES FOR HOME USE     Order Specific Question Answer Comments   Type: Axillary    Height: 5' 2" (1.575 m)    Weight: 57.6 kg (126 lb 14 oz)    Length of need (1-99 months): 1 week     Diet general     Call MD for:  temperature >100.4     Call MD for:  persistent nausea and vomiting     Call MD for:  severe uncontrolled pain     Call MD for:  difficulty breathing, headache or visual disturbances     Call MD for:  redness, tenderness, or signs of infection (pain, swelling, redness, odor or green/yellow discharge around incision site)     Call MD for:  hives     Call MD for:  persistent dizziness or light-headedness     Call MD for:  extreme fatigue     Other restrictions (specify):   Order Comments: Range of motion as tolerated     Change dressing (specify)   Order Comments: Dressing change: Dressing change at the first physical therapy appointment, or thursday     Weight bearing as tolerated        TIME SPENT ON DISCHARGE: 30 minutes  "

## 2024-09-12 ENCOUNTER — CLINICAL SUPPORT (OUTPATIENT)
Facility: HOSPITAL | Age: 24
End: 2024-09-12
Attending: ORTHOPAEDIC SURGERY
Payer: COMMERCIAL

## 2024-09-12 DIAGNOSIS — M62.559 ATROPHY OF QUADRICEPS FEMORIS MUSCLE: ICD-10-CM

## 2024-09-12 DIAGNOSIS — Z87.39 S/P ARTHROSCOPIC RECONSTRUCTION OF ACL OF LEFT KNEE USING QUADRICEPS TENDON AUTOGRAFT: ICD-10-CM

## 2024-09-12 DIAGNOSIS — S83.222D PERIPHERAL TEAR OF MEDIAL MENISCUS OF LEFT KNEE AS CURRENT INJURY, SUBSEQUENT ENCOUNTER: ICD-10-CM

## 2024-09-12 DIAGNOSIS — Z98.890 S/P ARTHROSCOPIC RECONSTRUCTION OF ACL OF LEFT KNEE USING QUADRICEPS TENDON AUTOGRAFT: ICD-10-CM

## 2024-09-12 DIAGNOSIS — M62.81 WEAKNESS OF LEFT QUADRICEPS MUSCLE: ICD-10-CM

## 2024-09-12 DIAGNOSIS — M25.662 DECREASED RANGE OF MOTION OF LEFT KNEE: Primary | ICD-10-CM

## 2024-09-12 DIAGNOSIS — S83.512D RUPTURE OF ANTERIOR CRUCIATE LIGAMENT OF LEFT KNEE, SUBSEQUENT ENCOUNTER: ICD-10-CM

## 2024-09-12 PROBLEM — S83.512A SPRAIN OF ANTERIOR CRUCIATE LIGAMENT OF LEFT KNEE: Status: RESOLVED | Noted: 2024-08-23 | Resolved: 2024-09-12

## 2024-09-12 PROCEDURE — 97014 ELECTRIC STIMULATION THERAPY: CPT | Mod: PN

## 2024-09-12 PROCEDURE — 97016 VASOPNEUMATIC DEVICE THERAPY: CPT | Mod: PN

## 2024-09-12 PROCEDURE — 97116 GAIT TRAINING THERAPY: CPT | Mod: PN

## 2024-09-12 PROCEDURE — 97140 MANUAL THERAPY 1/> REGIONS: CPT | Mod: PN

## 2024-09-12 PROCEDURE — 97110 THERAPEUTIC EXERCISES: CPT | Mod: PN

## 2024-09-12 PROCEDURE — 97112 NEUROMUSCULAR REEDUCATION: CPT | Mod: PN

## 2024-09-12 NOTE — PROGRESS NOTES
See plan of care for initial evaluation.        Iraj Huang, PT, DPT  Physical Therapist  Board-Certified Specialist in Orthopaedic and Sports Physical Therapy  9/12/2024

## 2024-09-12 NOTE — PLAN OF CARE
[facility]   Physical Therapy Initial Evaluation      Date: 9/12/2024   Name: Smooth Perez  Clinic Number: [id number]    Therapy Diagnosis:   Encounter Diagnoses   Name Primary?    Peripheral tear of medial meniscus of left knee as current injury, subsequent encounter     Rupture of anterior cruciate ligament of left knee, subsequent encounter     Decreased range of motion of left knee Yes    Atrophy of quadriceps femoris muscle     Weakness of left quadriceps muscle     S/P arthroscopic reconstruction of ACL of left knee using quadriceps tendon autograft       Physician: Sarkis Meza MD     Physician Orders: PT Eval and Treat  Medical Diagnosis from Referral: Peripheral tear of medial meniscus of left knee as current injury, subsequent encounter [S83.222D], Rupture of anterior cruciate ligament of left knee, subsequent encounter [S83.512D]   Evaluation Date: 9/12/2024  Authorization Period Expiration: 12/31/24  Plan of Care Expiration: 12/31//2024  Progress Note Due: 10/12/2024  Visit # / Visits authorized: 1/1   FOTO: 1/3 (last performed on 9/12/2024)    Precautions: Standard    Date of Surgery   9/10/24   Surgery Performed   AMNION TRIAL, RECONSTRUCTION, KNEE, ACL, ARTHROSCOPIC (Left)  LYSIS, ADHESIONS, KNEE, ARTHROSCOPIC (Left)      Post-Op Precautions   ACL recon protocol  Weight bearing: as tolerated  Range of motion: as tolerated  Antibiotics: 5 days of PO prophylactic antibiotics  DVT Ppx: Eliquis  PT/OT: Start POD#3  Follow-up: 10-14 days with AP/Lateral of operative knee (non-weightbearing)         Time In: 2:25 PM  Time Out: 3:25 PM  Total Billable Time (timed & untimed codes): 60 minutes    Subjective     Date of onset: July 2024    History of current condition - Smooth reports she injured her knee playing football in [place]. She reports she has been struggling with [facility] but overall feels like she is doing okay. She has no new complaints  today.      Pain:  Current 8/10, worst 9/10, best 4/10   Location: [] Right   [x] Left:  knee  Description: aching   Aggravating Factors: movement  Easing Factors: activity avoidance, rest, medication    Prior Therapy:   [] N/A    [x] Yes: 2 visits of pre-surgery physical therapy   Occupation: Pt is student  Prior Level of Function: Independent and pain free with all ADL, IADL, community mobility and functional activities.   Current Level of Function: Independent with all ADL, IADL, community mobility and functional activities with reports of increased pain and need for increased time and frequent breaks.    Pts goals: Pt reported goals are to decrease overall pain levels in order to return to prior functional level.     Medical History:   Past Medical History:   Diagnosis Date    Sickle cell trait        Surgical History:   Smooth Perez  has a past surgical history that includes amnion trial, reconstruction, knee, acl, arthroscopic (Left, 9/10/2024) and lysis, adhesions, knee, arthroscopic (Left, 9/10/2024).    Medications:   Smooth has a current medication list which includes the following prescription(s): apixaban, cephalexin, docusate sodium, ondansetron, oxycodone, oxycodone-acetaminophen, and valacyclovir.    Allergies:   Review of patient's allergies indicates:  No Known Allergies     Objective        Range of Motion:   Extension:   L: 0 degrees   R: 5 degrees hyperextension  Flexion:   L: 85 degrees   R: soft tissue approximation (full)    Quad Activation/Control: poor      Lower Extremity Strength  Left LE  Right LE    Knee extension: 3-/5 Knee extension: 5/5   Knee flexion: 3-/5 Knee flexion: 5/5   Hip extension:  3/5 Hip extension: 4+/5   Hip abduction: 3/5 Hip abduction: 5/5       Function:  - Gait B axillary crutches and brace locked in extension  - Squat: NP   - Single Leg Squat: NP  - Single Leg Step Down Test: NP        Function:     CMS Impairment/Limitation/Restriction for FOTO Knee  Survey    Therapist reviewed FOTO scores for Smooth on 9/12/2024.   FOTO documents entered into [employer] - see Media section.    Functional Score: 12%         Treatment     Total Treatment time (time-based codes) separate from Evaluation: (60) minutes     Intervention Code  (see below chart) Date/Notes  9/12/24   GameReady  10 minutes   Manual Therapy  MT Patella mobs; knee PROM   Heel Prop TE 5 minutes   NMES: Quad Sets NR 15 minutes ; 30:10   Standing Weight Shifts NR Brace on ; front to back 2 minutes ; side to side 2 minutes   Gait Training GT 10 minutes: sequencing/pattern for double crutch gait with WBAT     8 minutes of Therapeutic Exercise (TE) to develop strength, endurance, ROM, and flexibility. (84141)  10 minutes of Manual therapy (MT) to improve pain and ROM. (01935)  25 minutes of Neuromuscular Re-Education (NR)  to improve: Balance, Coordination, Kinesthetic, Sense, Proprioception, and Posture. (35820)  0 minutes of Therapeutic Activities (TA) to improve functional performance. (92835)  8 minutes of gait Training  Unattended Electrical Stimulation (ES) for muscle performance and/or pain modulation. (85393)  Vasopneumatic Device Therapy () for management of swelling/edema. (43854)  BFR: Blood flow restriction applied during exercise  NP: Not Performed      Patient Education and Home Exercises     Education provided:  PURPOSE: Patient educated on the impairments noted above and the effects of physical therapy intervention to improve overall condition and QOL.   EXERCISE: Patient was educated on all the above exercise prior/during/after for proper posture, positioning, and execution for safe performance with home exercise program.   STRENGTH: Patient educated on the importance of improved core and extremity strength in order to improve alignment of the spine and extremities with static positions and dynamic movement.   GAIT & BALANCE: Patient educated on the importance of strong core and lower extremity  musculature in order to improve both static and dynamic balance, improve gait mechanics, reduce fall risk and improve household and community mobility.   ASSISTIVE DEVICE: Patient educated on proper utilization of assistive device for safe and efficient ambulation and to reduce risk of falls  BRACE: patient educated on proper fit, positioning, and technique for donning and doffing brace in order to maintain optimal alignment of the extremity and promote healing     Written Home Exercises Provided: yes.  Exercises were reviewed and Smooth was able to demonstrate them prior to the end of the session.  Smooth demonstrated good  understanding of the education provided. See EMR under Patient Instructions for exercises provided during therapy sessions.    Assessment     Smooth is a 24 y.o. female referred to outpatient Physical Therapy with a medical diagnosis of Peripheral tear of medial meniscus of left knee as current injury, subsequent encounter [S83.222D], Rupture of anterior cruciate ligament of left knee, subsequent encounter [S83.512D] . Pt presents with impairments in the following categories: IMPAIRMENTS: ROM, strength, endurance, joint mobility, muscle length, balance, posture, gait mechanics, core strength and stability, functional movement patterns, post-operative precautions, and coordination. Prioritize restoration of full knee extension and good quadriceps activation. Swelling is well controlled today.    Pt prognosis is Good  Pt will benefit from skilled outpatient Physical Therapy to address the deficits stated above and in the chart below, provide pt/family education, and to maximize pt's level of independence.     Plan of care discussed with patient: Yes  Pt's spiritual, cultural and educational needs considered and patient is agreeable to the plan of care and goals as stated below:     Anticipated Barriers for therapy: none    Medical Necessity is demonstrated by the  "following  History  Co-morbidities and personal factors that may impact the plan of care [] LOW: no personal factors / co-morbidities  [x] MODERATE: 1-2 personal factors / co-morbidities  [] HIGH: 3+ personal factors / co-morbidities    Moderate / High Support Documentation:   Past Medical History:   Diagnosis Date    Sickle cell trait         Examination  Body Structures and Functions, activity limitations and participation restrictions that may impact the plan of care [] LOW: addressing 1-2 elements  [x] MODERATE: 3+ elements  [] HIGH: 4+ elements (please support below)    Moderate / High Support Documentation: See above in "Current Level of Function"      Clinical Presentation [] LOW: stable  [x] MODERATE: Evolving  [] HIGH: Unstable     Decision Making/ Complexity Score: moderate         Goals:  Short Term Goals:  6 weeks Status  Date Met   PAIN: Pt will report worst pain of 4/10 in order to progress toward max functional ability and improve quality of life. [x] Progressing  [] Met  [] Not Met     FUNCTION: Patient will demonstrate improved function as indicated by a functional score of greater than or equal to 5 out of 100 on FOTO. [x] Progressing  [] Met  [] Not Met     MOBILITY: Patient will improve AROM to 50% of stated goals, listed in objective measures above, in order to progress towards independence with functional activities.  [x] Progressing  [] Met  [] Not Met     STRENGTH: Patient will improve strength to 40% of stated goals, listed in objective measures above, in order to progress towards independence with functional activities. [x] Progressing  [] Met  [] Not Met     HEP: Patient will demonstrate independence with HEP in order to progress toward functional independence. [x] Progressing  [] Met  [] Not Met        Long Term Goals:  12 weeks Status Date Met   PAIN: Pt will report worst pain of 2/10 in order to progress toward max functional ability and improve quality of life [x] Progressing  [] " Met  [] Not Met     FUNCTION: Patient will demonstrate improved function as indicated by a functional score of greater than or equal to 75 out of 100 on FOTO. [x] Progressing  [] Met  [] Not Met     MOBILITY: Patient will improve AROM to stated goals, listed in objective measures above, in order to return to maximal functional potential and improve quality of life. Goal: Full L knee ROM [x] Progressing  [] Met  [] Not Met     STRENGTH: Patient will improve strength to stated goals, listed in objective measures above, in order to improve functional independence and quality of life. Goal: 75% symmetry on Biodex Testing for LLE [x] Progressing  [] Met  [] Not Met     GAIT: Patient will demonstrate normalized gait mechanics with minimal compensation in order to return to PLOF. [x] Progressing  [] Met  [] Not Met        Final Goals:  9 months post-op Status Date Met   PAIN: Pt will report worst pain of 1/10 in order to progress toward max functional ability and improve quality of life [x] Progressing  [] Met  [] Not Met     FUNCTION: Patient will demonstrate improved function as indicated by a functional score of greater than or equal to 90 out of 100 on FOTO. [x] Progressing  [] Met  [] Not Met     STRENGTH: Patient will improve strength to stated goals, listed in objective measures above, in order to improve functional independence and quality of life. Goal: 95% symmetry on Biodex Testing for LLE [x] Progressing  [] Met  [] Not Met     Patient will return to normal ADL's, IADL's, community involvement, recreational activities, and work-related activities including volleyball. [x] Progressing  [] Met  [] Not Met       Plan     Plan of care Certification: 9/12/2024 to 12/31/24.    Outpatient Physical Therapy 2 times weekly for 24 weeks to include any combination of the following interventions: virtual visits, dry needling, modalities, electrical stimulation (IFC, Pre-Mod, Attended with Functional Dry Needling), Manual  Therapy, Neuromuscular Re-ed, Patient Education, Self Care, Therapeutic Exercise, and Therapeutic Activites       Irja Huang, PT, DPT  Physical Therapist  Board-Certified Specialist in Orthopaedic and Sports Physical Therapy  9/12/2024      I CERTIFY THE NEED FOR THESE SERVICES FURNISHED UNDER THIS PLAN OF TREATMENT AND WHILE UNDER MY CARE   Physician's comments:     Physician's Signature: ___________________________________________________

## 2024-09-13 ENCOUNTER — CLINICAL SUPPORT (OUTPATIENT)
Facility: HOSPITAL | Age: 24
End: 2024-09-13
Attending: ORTHOPAEDIC SURGERY
Payer: COMMERCIAL

## 2024-09-13 DIAGNOSIS — Z98.890 S/P ARTHROSCOPIC RECONSTRUCTION OF ACL OF LEFT KNEE USING QUADRICEPS TENDON AUTOGRAFT: ICD-10-CM

## 2024-09-13 DIAGNOSIS — M62.81 WEAKNESS OF LEFT QUADRICEPS MUSCLE: ICD-10-CM

## 2024-09-13 DIAGNOSIS — Z87.39 S/P ARTHROSCOPIC RECONSTRUCTION OF ACL OF LEFT KNEE USING QUADRICEPS TENDON AUTOGRAFT: ICD-10-CM

## 2024-09-13 DIAGNOSIS — M62.559 ATROPHY OF QUADRICEPS FEMORIS MUSCLE: ICD-10-CM

## 2024-09-13 DIAGNOSIS — M25.662 DECREASED RANGE OF MOTION OF LEFT KNEE: Primary | ICD-10-CM

## 2024-09-13 PROCEDURE — 97110 THERAPEUTIC EXERCISES: CPT | Mod: PN

## 2024-09-13 PROCEDURE — 97014 ELECTRIC STIMULATION THERAPY: CPT | Mod: PN

## 2024-09-13 PROCEDURE — 97140 MANUAL THERAPY 1/> REGIONS: CPT | Mod: PN

## 2024-09-13 PROCEDURE — 97016 VASOPNEUMATIC DEVICE THERAPY: CPT | Mod: PN

## 2024-09-13 PROCEDURE — 97112 NEUROMUSCULAR REEDUCATION: CPT | Mod: PN

## 2024-09-13 NOTE — PROGRESS NOTES
[facility] OUTPATIENT THERAPY AND WELLNESS   Physical Therapy Treatment Note     Name: Smooth Perez  Clinic Number: [id number]    Therapy Diagnosis:   Encounter Diagnoses   Name Primary?    Decreased range of motion of left knee Yes    Atrophy of quadriceps femoris muscle     Weakness of left quadriceps muscle     S/P arthroscopic reconstruction of ACL of left knee using quadriceps tendon autograft      Physician: Sarkis Meza MD    Visit Date: 9/13/2024    Physician Orders: PT Eval and Treat  Medical Diagnosis from Referral: Peripheral tear of medial meniscus of left knee as current injury, subsequent encounter [S83.222D], Rupture of anterior cruciate ligament of left knee, subsequent encounter [S83.512D]   Evaluation Date: 9/12/2024  Authorization Period Expiration: 12/31/24  Plan of Care Expiration: 12/31//2024  Progress Note Due: 10/12/2024  Visit # / Visits authorized: 1/12 (1/1 eval)  FOTO: 1/3 (last performed on 9/12/2024)    Time In: 10:00 AM  Time Out: 11:30 AM  Total Billable Time: 90 minutes    SUBJECTIVE     Pt reports: she is still having pain in her knee. She reports she has not done much since she was seen yesterday.  She was compliant with home exercise program.  Response to previous treatment: no notable change  Functional change: no notable change    Pain: 2/10  Location: left knee    OBJECTIVE     Objective Measures updated at progress report unless specified.     Treatment     Smooth received the treatments listed below:      GameReady  10 minutes   Manual Therapy  MT Patella mobs; knee PROM   Heel Prop  5 minutes   NMES: Quad Sets NR 20 minutes ; 30:10   Standing Weight Shifts NR Brace on ; front to back 2 minutes ; side to side 2 minutes     8 minutes of Therapeutic Exercise (TE) to develop strength, endurance, ROM, and flexibility. (09985)  10 minutes of Manual therapy (MT) to improve pain and ROM. (53171)  25 minutes of Neuromuscular Re-Education (NR)  to improve: Balance,  Coordination, Kinesthetic, Sense, Proprioception, and Posture. (22620)  0 minutes of Therapeutic Activities (TA) to improve functional performance. (23204)  Unattended Electrical Stimulation (ES) for muscle performance and/or pain modulation. (49642)  Vasopneumatic Device Therapy () for management of swelling/edema. (00872)  BFR: Blood flow restriction applied during exercise  NP: Not Performed    Patient Education and Home Exercises     Home Exercises Provided and Patient Education Provided     Education provided:   - HEP, PT POC    Written Home Exercises Provided: yes. Exercises were reviewed and Smooth was able to demonstrate them prior to the end of the session.  Smooth demonstrated good  understanding of the education provided. See EMR under Patient Instructions for exercises provided during therapy sessions    ASSESSMENT     Patient tolerated today's treatment well. She will benefit from continued physical therapy care.    Smooth Is progressing well towards her goals.   Pt prognosis is Excellent.     Pt will continue to benefit from skilled outpatient physical therapy to address the deficits listed in the problem list box on initial evaluation, provide pt/family education and to maximize pt's level of independence in the home and community environment.     Pt's spiritual, cultural and educational needs considered and pt agreeable to plan of care and goals.     Anticipated barriers to physical therapy: None    Goals:  Goals:  Short Term Goals:  6 weeks Status  Date Met   PAIN: Pt will report worst pain of 4/10 in order to progress toward max functional ability and improve quality of life. [x] Progressing  [] Met  [] Not Met     FUNCTION: Patient will demonstrate improved function as indicated by a functional score of greater than or equal to 5 out of 100 on FOTO. [x] Progressing  [] Met  [] Not Met     MOBILITY: Patient will improve AROM to 50% of stated goals, listed in objective measures above, in order  to progress towards independence with functional activities.  [x] Progressing  [] Met  [] Not Met     STRENGTH: Patient will improve strength to 40% of stated goals, listed in objective measures above, in order to progress towards independence with functional activities. [x] Progressing  [] Met  [] Not Met     HEP: Patient will demonstrate independence with HEP in order to progress toward functional independence. [x] Progressing  [] Met  [] Not Met        Long Term Goals:  12 weeks Status Date Met   PAIN: Pt will report worst pain of 2/10 in order to progress toward max functional ability and improve quality of life [x] Progressing  [] Met  [] Not Met     FUNCTION: Patient will demonstrate improved function as indicated by a functional score of greater than or equal to 75 out of 100 on FOTO. [x] Progressing  [] Met  [] Not Met     MOBILITY: Patient will improve AROM to stated goals, listed in objective measures above, in order to return to maximal functional potential and improve quality of life. Goal: Full L knee ROM [x] Progressing  [] Met  [] Not Met     STRENGTH: Patient will improve strength to stated goals, listed in objective measures above, in order to improve functional independence and quality of life. Goal: 75% symmetry on Biodex Testing for LLE [x] Progressing  [] Met  [] Not Met     GAIT: Patient will demonstrate normalized gait mechanics with minimal compensation in order to return to PLOF. [x] Progressing  [] Met  [] Not Met        Final Goals:  9 months post-op Status Date Met   PAIN: Pt will report worst pain of 1/10 in order to progress toward max functional ability and improve quality of life [x] Progressing  [] Met  [] Not Met     FUNCTION: Patient will demonstrate improved function as indicated by a functional score of greater than or equal to 90 out of 100 on FOTO. [x] Progressing  [] Met  [] Not Met     STRENGTH: Patient will improve strength to stated goals, listed in objective measures  above, in order to improve functional independence and quality of life. Goal: 95% symmetry on Biodex Testing for LLE [x] Progressing  [] Met  [] Not Met     Patient will return to normal ADL's, IADL's, community involvement, recreational activities, and work-related activities including volleyball. [x] Progressing  [] Met  [] Not Met         PLAN   Continue per plan of care.  Progress as tolerated.    Iraj Huang, PT

## 2024-09-17 ENCOUNTER — CLINICAL SUPPORT (OUTPATIENT)
Facility: HOSPITAL | Age: 24
End: 2024-09-17
Payer: COMMERCIAL

## 2024-09-17 DIAGNOSIS — M62.81 WEAKNESS OF LEFT QUADRICEPS MUSCLE: ICD-10-CM

## 2024-09-17 DIAGNOSIS — Z98.890 S/P ARTHROSCOPIC RECONSTRUCTION OF ACL OF LEFT KNEE USING QUADRICEPS TENDON AUTOGRAFT: ICD-10-CM

## 2024-09-17 DIAGNOSIS — M62.559 ATROPHY OF QUADRICEPS FEMORIS MUSCLE: ICD-10-CM

## 2024-09-17 DIAGNOSIS — M25.662 DECREASED RANGE OF MOTION OF LEFT KNEE: Primary | ICD-10-CM

## 2024-09-17 DIAGNOSIS — Z87.39 S/P ARTHROSCOPIC RECONSTRUCTION OF ACL OF LEFT KNEE USING QUADRICEPS TENDON AUTOGRAFT: ICD-10-CM

## 2024-09-17 PROCEDURE — 97110 THERAPEUTIC EXERCISES: CPT | Mod: PN

## 2024-09-17 PROCEDURE — 97014 ELECTRIC STIMULATION THERAPY: CPT | Mod: PN

## 2024-09-17 PROCEDURE — 97016 VASOPNEUMATIC DEVICE THERAPY: CPT | Mod: PN

## 2024-09-17 PROCEDURE — 97112 NEUROMUSCULAR REEDUCATION: CPT | Mod: PN

## 2024-09-17 NOTE — PROGRESS NOTES
[facility]   Physical Therapy Treatment Note     Name: Smooth Perez  Clinic Number: [id number]    Therapy Diagnosis:   Encounter Diagnoses   Name Primary?    Decreased range of motion of left knee Yes    Atrophy of quadriceps femoris muscle     Weakness of left quadriceps muscle     S/P arthroscopic reconstruction of ACL of left knee using quadriceps tendon autograft      Physician: Sarkis Meza MD    Visit Date: 9/17/2024    Physician Orders: PT Eval and Treat  Medical Diagnosis from Referral: Peripheral tear of medial meniscus of left knee as current injury, subsequent encounter [S83.222D], Rupture of anterior cruciate ligament of left knee, subsequent encounter [S83.512D]   Evaluation Date: 9/12/2024  Authorization Period Expiration: 12/31/24  Plan of Care Expiration: 12/31//2024  Progress Note Due: 10/12/2024  Visit # / Visits authorized: 2/12 (1/1 eval)  FOTO: 1/3 (last performed on 9/12/2024)    Time In: 11:00 AM  Time Out: 12:15 PM  Total Billable Time: 75 minutes    SUBJECTIVE     Pt reports: she is having more pain today. She has no new complaints today.  She was compliant with home exercise program.  Response to previous treatment: no notable change  Functional change: no notable change    Pain: 2/10  Location: left knee    OBJECTIVE     Objective Measures updated at progress report unless specified.     Treatment     Smooth received the treatments listed below:      Intervention Code  (see below chart) Date/Notes  9/17/24   GameReady  10 minutes   Manual Therapy  MT Patella mobs; knee PROM   Heel Prop TE 10 minutes   NMES: Quad Sets NR 10 minutes supine ; 10 minutes prone   Assisted SLR (PT) NR 5 minutes   Standing Weight Shifts NR NP     0 minutes of Therapeutic Exercise (TE) to develop strength, endurance, ROM, and flexibility. (11257)  10 minutes of Manual therapy (MT) to improve pain and ROM. (32752)  40 minutes of Neuromuscular Re-Education (NR)  to improve:  Balance, Coordination, Kinesthetic, Sense, Proprioception, and Posture. (56353)  0 minutes of Therapeutic Activities (TA) to improve functional performance. (59738)  Unattended Electrical Stimulation (ES) for muscle performance and/or pain modulation. (46311)  Vasopneumatic Device Therapy () for management of swelling/edema. (86730)  BFR: Blood flow restriction applied during exercise  NP: Not Performed    Patient Education and Home Exercises     Home Exercises Provided and Patient Education Provided     Education provided:   - HEP, PT POC    Written Home Exercises Provided: yes. Exercises were reviewed and Smooth was able to demonstrate them prior to the end of the session.  Smooth demonstrated good  understanding of the education provided. See EMR under Patient Instructions for exercises provided during therapy sessions    ASSESSMENT     Patient continues to have difficulty with quadriceps activation. She will benefit from continued care.    Smooth Is progressing well towards her goals.   Pt prognosis is Excellent.     Pt will continue to benefit from skilled outpatient physical therapy to address the deficits listed in the problem list box on initial evaluation, provide pt/family education and to maximize pt's level of independence in the home and community environment.     Pt's spiritual, cultural and educational needs considered and pt agreeable to plan of care and goals.     Anticipated barriers to physical therapy: None    Goals:  Goals:  Short Term Goals:  6 weeks Status  Date Met   PAIN: Pt will report worst pain of 4/10 in order to progress toward max functional ability and improve quality of life. [x] Progressing  [] Met  [] Not Met     FUNCTION: Patient will demonstrate improved function as indicated by a functional score of greater than or equal to 5 out of 100 on FOTO. [x] Progressing  [] Met  [] Not Met     MOBILITY: Patient will improve AROM to 50% of stated goals, listed in objective measures  above, in order to progress towards independence with functional activities.  [x] Progressing  [] Met  [] Not Met     STRENGTH: Patient will improve strength to 40% of stated goals, listed in objective measures above, in order to progress towards independence with functional activities. [x] Progressing  [] Met  [] Not Met     HEP: Patient will demonstrate independence with HEP in order to progress toward functional independence. [x] Progressing  [] Met  [] Not Met        Long Term Goals:  12 weeks Status Date Met   PAIN: Pt will report worst pain of 2/10 in order to progress toward max functional ability and improve quality of life [x] Progressing  [] Met  [] Not Met     FUNCTION: Patient will demonstrate improved function as indicated by a functional score of greater than or equal to 75 out of 100 on FOTO. [x] Progressing  [] Met  [] Not Met     MOBILITY: Patient will improve AROM to stated goals, listed in objective measures above, in order to return to maximal functional potential and improve quality of life. Goal: Full L knee ROM [x] Progressing  [] Met  [] Not Met     STRENGTH: Patient will improve strength to stated goals, listed in objective measures above, in order to improve functional independence and quality of life. Goal: 75% symmetry on Biodex Testing for LLE [x] Progressing  [] Met  [] Not Met     GAIT: Patient will demonstrate normalized gait mechanics with minimal compensation in order to return to PLOF. [x] Progressing  [] Met  [] Not Met        Final Goals:  9 months post-op Status Date Met   PAIN: Pt will report worst pain of 1/10 in order to progress toward max functional ability and improve quality of life [x] Progressing  [] Met  [] Not Met     FUNCTION: Patient will demonstrate improved function as indicated by a functional score of greater than or equal to 90 out of 100 on FOTO. [x] Progressing  [] Met  [] Not Met     STRENGTH: Patient will improve strength to stated goals, listed in  objective measures above, in order to improve functional independence and quality of life. Goal: 95% symmetry on Biodex Testing for LLE [x] Progressing  [] Met  [] Not Met     Patient will return to normal ADL's, IADL's, community involvement, recreational activities, and work-related activities including volleyball. [x] Progressing  [] Met  [] Not Met         PLAN   Continue per plan of care.  Progress as tolerated.    Iraj Huang, PT

## 2024-09-18 ENCOUNTER — OFFICE VISIT (OUTPATIENT)
Dept: SPORTS MEDICINE | Facility: CLINIC | Age: 24
End: 2024-09-18
Payer: COMMERCIAL

## 2024-09-18 ENCOUNTER — CLINICAL SUPPORT (OUTPATIENT)
Facility: HOSPITAL | Age: 24
End: 2024-09-18
Attending: ORTHOPAEDIC SURGERY
Payer: COMMERCIAL

## 2024-09-18 ENCOUNTER — HOSPITAL ENCOUNTER (OUTPATIENT)
Dept: RADIOLOGY | Facility: HOSPITAL | Age: 24
Discharge: HOME | End: 2024-09-18
Attending: PHYSICIAN ASSISTANT
Payer: COMMERCIAL

## 2024-09-18 DIAGNOSIS — Z87.39 S/P ARTHROSCOPIC RECONSTRUCTION OF ACL OF LEFT KNEE USING QUADRICEPS TENDON AUTOGRAFT: ICD-10-CM

## 2024-09-18 DIAGNOSIS — Z98.890 S/P ACL RECONSTRUCTION: ICD-10-CM

## 2024-09-18 DIAGNOSIS — G89.18 POST-OP PAIN: ICD-10-CM

## 2024-09-18 DIAGNOSIS — G89.18 POST-OP PAIN: Primary | ICD-10-CM

## 2024-09-18 DIAGNOSIS — Z98.890 S/P ARTHROSCOPIC RECONSTRUCTION OF ACL OF LEFT KNEE USING QUADRICEPS TENDON AUTOGRAFT: ICD-10-CM

## 2024-09-18 DIAGNOSIS — D57.3 SICKLE CELL TRAIT: ICD-10-CM

## 2024-09-18 DIAGNOSIS — S83.512D RUPTURE OF ANTERIOR CRUCIATE LIGAMENT OF LEFT KNEE, SUBSEQUENT ENCOUNTER: ICD-10-CM

## 2024-09-18 DIAGNOSIS — M62.81 WEAKNESS OF LEFT QUADRICEPS MUSCLE: ICD-10-CM

## 2024-09-18 DIAGNOSIS — M62.559 ATROPHY OF QUADRICEPS FEMORIS MUSCLE: ICD-10-CM

## 2024-09-18 DIAGNOSIS — M25.662 DECREASED RANGE OF MOTION OF LEFT KNEE: Primary | ICD-10-CM

## 2024-09-18 DIAGNOSIS — S83.222D PERIPHERAL TEAR OF MEDIAL MENISCUS OF LEFT KNEE AS CURRENT INJURY, SUBSEQUENT ENCOUNTER: ICD-10-CM

## 2024-09-18 PROCEDURE — 97110 THERAPEUTIC EXERCISES: CPT | Mod: PN

## 2024-09-18 PROCEDURE — 97140 MANUAL THERAPY 1/> REGIONS: CPT | Mod: PN

## 2024-09-18 PROCEDURE — 97016 VASOPNEUMATIC DEVICE THERAPY: CPT | Mod: PN

## 2024-09-18 PROCEDURE — 1159F MED LIST DOCD IN RCRD: CPT | Mod: CPTII,S$GLB,, | Performed by: PHYSICIAN ASSISTANT

## 2024-09-18 PROCEDURE — 99024 POSTOP FOLLOW-UP VISIT: CPT | Mod: S$GLB,,, | Performed by: PHYSICIAN ASSISTANT

## 2024-09-18 PROCEDURE — 73564 X-RAY EXAM KNEE 4 OR MORE: CPT | Mod: TC,PN,LT

## 2024-09-18 PROCEDURE — 99999 PR PBB SHADOW E&M-EST. PATIENT-LVL II: CPT | Mod: PBBFAC,,, | Performed by: PHYSICIAN ASSISTANT

## 2024-09-18 PROCEDURE — 97014 ELECTRIC STIMULATION THERAPY: CPT | Mod: PN

## 2024-09-18 PROCEDURE — 73564 X-RAY EXAM KNEE 4 OR MORE: CPT | Mod: 26,LT,, | Performed by: RADIOLOGY

## 2024-09-18 PROCEDURE — 1160F RVW MEDS BY RX/DR IN RCRD: CPT | Mod: CPTII,S$GLB,, | Performed by: PHYSICIAN ASSISTANT

## 2024-09-18 PROCEDURE — 97112 NEUROMUSCULAR REEDUCATION: CPT | Mod: PN

## 2024-09-18 PROCEDURE — 73562 X-RAY EXAM OF KNEE 3: CPT | Mod: 26,59,RT, | Performed by: RADIOLOGY

## 2024-09-18 RX ORDER — HYDROCODONE BITARTRATE AND ACETAMINOPHEN 5; 325 MG/1; MG/1
1 TABLET ORAL
Qty: 20 TABLET | Refills: 0 | Status: SHIPPED | OUTPATIENT
Start: 2024-09-18 | End: 2024-09-19 | Stop reason: SDUPTHER

## 2024-09-18 NOTE — PROGRESS NOTES
Patient ID: Smotoh Perez  YOB: 2000  MRN: [id number]    Chief Complaint: Injury of the Left Knee    Referred By: Surgery     History of Present Illness: Smooth Perez is a  24 y.o. female   About to start PA School with a chief complaint of Injury of the Left Knee    Smooth Perez presents today 8 days. She presents PWB: left lower extremity with TROM & crutches brace/assistive devices. The patient has started physical therapy at [facility] working with Iraj. Patient presents today for pain and trouble firing her quad.     Past Medical History:   Past Medical History:   Diagnosis Date    Sickle cell trait      Past Surgical History:   Procedure Laterality Date    AMNION TRIAL, RECONSTRUCTION, KNEE, ACL, ARTHROSCOPIC Left 9/10/2024    Procedure: AMNION TRIAL, RECONSTRUCTION, KNEE, ACL, ARTHROSCOPIC;  Surgeon: Sarkis Meza MD;  Location: [facility];  Service: Orthopedics;  Laterality: Left;    LYSIS, ADHESIONS, KNEE, ARTHROSCOPIC Left 9/10/2024    Procedure: LYSIS, ADHESIONS, KNEE, ARTHROSCOPIC;  Surgeon: Sarkis Meza MD;  Location: [facility];  Service: Orthopedics;  Laterality: Left;     No family history on file.  Social History     Socioeconomic History    Marital status: Single   Tobacco Use    Smoking status: Never     Passive exposure: Never    Smokeless tobacco: Never   Substance and Sexual Activity    Alcohol use: Not Currently    Drug use: Not Currently     Types: Marijuana    Sexual activity: Not Currently     Partners: Male     Birth control/protection: I.U.D.     Medication List with Changes/Refills   New Medications    HYDROCODONE-ACETAMINOPHEN (NORCO) 5-325 MG PER TABLET    Take 1 tablet by mouth every 4 to 6 hours as needed for Pain.   Current Medications    APIXABAN (ELIQUIS) 2.5 MG TAB    Take 1 tablet (2.5 mg total) by mouth once daily. for 21 days    DOCUSATE SODIUM (COLACE) 100 MG CAPSULE    Take 1 capsule (100 mg total) by mouth 2 (two) times daily.     ONDANSETRON (ZOFRAN) 4 MG TABLET    Take 1 tablet (4 mg total) by mouth every 8 (eight) hours as needed for Nausea.    VALACYCLOVIR (VALTREX) 500 MG TABLET    Take 1 tablet by mouth every morning.   Discontinued Medications    OXYCODONE (ROXICODONE) 5 MG IMMEDIATE RELEASE TABLET    Take 1 tablet (5 mg total) by mouth every 4 (four) hours as needed for Pain (For break through pain).    OXYCODONE-ACETAMINOPHEN (PERCOCET) 5-325 MG PER TABLET    Take 1-2 tablet by mouth every 4 to 6 hours as needed for Pain.     Review of patient's allergies indicates:  No Known Allergies  ROS    Physical Exam:   There is no height or weight on file to calculate BMI.  There were no vitals filed for this visit.   GENERAL: Well appearing, appropriate for stated age, no acute distress.  CARDIOVASCULAR: Pulses regular by peripheral palpation.  PULMONARY: Respirations are even and non-labored.  NEURO: Awake, alert, and oriented x 3.  PSYCH: Mood & affect are appropriate.  HEENT: Head is normocephalic and atraumatic.    Ortho/SPM Exam  Left Knee Exam  Sutures intact, incision sites clean dry and intact, no signs of infection  Minimal effusion   Unable to do full seated knee flexion, trouble activating her quad  Knee ROM full extension to 90 degrees flexion-   All compartments are soft and compressible. Calf soft non-tender. Intact EHL, FHL, gastrocsoleus, and tibialis anterior. Sensation intact to light touch in superficial peroneal, deep peroneal, tibial, sural, and saphenous nerve distributions. Foot warm and well perfused with capillary refill of less than 2 seconds and palpable pedal pulses.       Imaging:   XR Results:  Results for orders placed during the hospital encounter of 09/10/24    X-Ray Knee 1 or 2 View Left    Narrative  EXAM:  XR KNEE 1 OR 2 VIEW LEFT    CLINICAL HISTORY:    intraop; TechNotes: Fluoro time 7 seconds    FINDINGS:  Intraoperative fluoroscopy was used for knee surgery.  Total fluoroscopic time was 7 seconds.  1  fluoroscopic image obtained.    Impression  As above    Finalized on: 9/11/2024 8:40 AM By:  Carlos Hoang MD  BRRG# [id number]      2024-09-11 08:[id number]    PEDROG    Relevant imaging results reviewed and interpreted by me, discussed with the patient and / or family today.      Patient Instructions   Assessment:  Smooth Perez is a  24 y.o. female   About to start PA School with a chief complaint of Injury of the Left Knee  8 days s/p Left knee scope, Left knee ACL reconstruction with quad tendon autograft, and lysis of adhesions   *Amnion Trial*    Encounter Diagnoses   Name Primary?    Post-op pain Yes    Sickle cell trait     Rupture of anterior cruciate ligament of left knee, subsequent encounter     Peripheral tear of medial meniscus of left knee as current injury, subsequent encounter     S/P ACL reconstruction       Plan:  Xrays taken today  Review exercises with the patient  Discussed post-op pain and protocol  Norco 5mg tabs- disp 20  Start to wean down on pain medicine.     Follow-up: Monday for post-op or sooner if there are any problems between now and then.    Leave Review:   Google: Leave Google Review  Healthgrades: Leave Healthgrades Review    After Hours Number: [phone]      Provider Note/Medical Decision Making:   Follow-up on Monday for postop    I discussed worrisome and red flag signs and symptoms with the patient. The patient expressed understanding and agreed to alert me immediately or to go to the emergency room if they experience any of these.   Treatment plan was developed with input from the patient/family, and they expressed understanding and agreement with the plan. All questions were answered today.      Trinh Carter PA-C  Sports Medicine Physician Assistant       Disclaimer: This note was prepared using a voice recognition system and is likely to have sound alike errors within the text.

## 2024-09-18 NOTE — PROGRESS NOTES
[facility]   Physical Therapy Treatment Note     Name: Smooth Perez  Clinic Number: [id number]    Therapy Diagnosis:   Encounter Diagnoses   Name Primary?    Decreased range of motion of left knee Yes    Atrophy of quadriceps femoris muscle     Weakness of left quadriceps muscle     S/P arthroscopic reconstruction of ACL of left knee using quadriceps tendon autograft      Physician: Sarkis Meza MD    Visit Date: 9/18/2024    Physician Orders: PT Eval and Treat  Medical Diagnosis from Referral: Peripheral tear of medial meniscus of left knee as current injury, subsequent encounter [S83.222D], Rupture of anterior cruciate ligament of left knee, subsequent encounter [S83.512D]   Evaluation Date: 9/12/2024  Authorization Period Expiration: 12/31/24  Plan of Care Expiration: 12/31//2024  Progress Note Due: 10/12/2024  Visit # / Visits authorized: 3/12 (1/1 eval)  FOTO: 1/3 (last performed on 9/12/2024)    Date of Surgery   9/10/24   Surgery Performed   AMNION TRIAL, RECONSTRUCTION, KNEE, ACL, ARTHROSCOPIC (Left)  LYSIS, ADHESIONS, KNEE, ARTHROSCOPIC (Left)      Post-Op Precautions   ACL recon protocol  Weight bearing: as tolerated  Range of motion: as tolerated  Antibiotics: 5 days of PO prophylactic antibiotics  DVT Ppx: Eliquis  PT/OT: Start POD#3  Follow-up: 10-14 days with AP/Lateral of operative knee (non-weightbearing)       Time In: 1:00 PM  Time Out: 2:25 PM  Total Billable Time: 85  minutes    SUBJECTIVE     Pt reports: she took pain medication prior to her visit today. She is seeing her ortho PA later today. She has no new complaints.  She was compliant with home exercise program.  Response to previous treatment: no notable change  Functional change: no notable change    Pain: 2/10  Location: left knee    OBJECTIVE     Objective Measures updated at progress report unless specified.     Treatment     Smooth received the treatments listed below:      Intervention Code  (see  below chart) Date/Notes  9/18/24   GameReady  10 minutes   Manual Therapy  MT Patella mobs; knee PROM   Heel Prop TE 5 minutes   Quad sets/LAQ NR 10 minutes   NMES: Quad Sets NR 10 minutes supine ; 10 minutes prone   Heel Slides TE 10x10s     10 minutes of Therapeutic Exercise (TE) to develop strength, endurance, ROM, and flexibility. (27301)  25 minutes of Manual therapy (MT) to improve pain and ROM. (04942)  40 minutes of Neuromuscular Re-Education (NR)  to improve: Balance, Coordination, Kinesthetic, Sense, Proprioception, and Posture. (37135)  0 minutes of Therapeutic Activities (TA) to improve functional performance. (60885)  Unattended Electrical Stimulation (ES) for muscle performance and/or pain modulation. (85289)  Vasopneumatic Device Therapy () for management of swelling/edema. (17391)  BFR: Blood flow restriction applied during exercise  NP: Not Performed    Patient Education and Home Exercises     Home Exercises Provided and Patient Education Provided     Education provided:   - HEP, PT POC    Written Home Exercises Provided: yes. Exercises were reviewed and Smooth was able to demonstrate them prior to the end of the session.  Smooth demonstrated good  understanding of the education provided. See EMR under Patient Instructions for exercises provided during therapy sessions    ASSESSMENT     Improvement in knee range of motion and quad activation today compared to yesterday. She will benefit from continued physical therapy care.    Smooth Is progressing well towards her goals.   Pt prognosis is Excellent.     Pt will continue to benefit from skilled outpatient physical therapy to address the deficits listed in the problem list box on initial evaluation, provide pt/family education and to maximize pt's level of independence in the home and community environment.     Pt's spiritual, cultural and educational needs considered and pt agreeable to plan of care and goals.     Anticipated barriers to  physical therapy: None    Goals:  Goals:  Short Term Goals:  6 weeks Status  Date Met   PAIN: Pt will report worst pain of 4/10 in order to progress toward max functional ability and improve quality of life. [x] Progressing  [] Met  [] Not Met     FUNCTION: Patient will demonstrate improved function as indicated by a functional score of greater than or equal to 5 out of 100 on FOTO. [x] Progressing  [] Met  [] Not Met     MOBILITY: Patient will improve AROM to 50% of stated goals, listed in objective measures above, in order to progress towards independence with functional activities.  [x] Progressing  [] Met  [] Not Met     STRENGTH: Patient will improve strength to 40% of stated goals, listed in objective measures above, in order to progress towards independence with functional activities. [x] Progressing  [] Met  [] Not Met     HEP: Patient will demonstrate independence with HEP in order to progress toward functional independence. [x] Progressing  [] Met  [] Not Met        Long Term Goals:  12 weeks Status Date Met   PAIN: Pt will report worst pain of 2/10 in order to progress toward max functional ability and improve quality of life [x] Progressing  [] Met  [] Not Met     FUNCTION: Patient will demonstrate improved function as indicated by a functional score of greater than or equal to 75 out of 100 on FOTO. [x] Progressing  [] Met  [] Not Met     MOBILITY: Patient will improve AROM to stated goals, listed in objective measures above, in order to return to maximal functional potential and improve quality of life. Goal: Full L knee ROM [x] Progressing  [] Met  [] Not Met     STRENGTH: Patient will improve strength to stated goals, listed in objective measures above, in order to improve functional independence and quality of life. Goal: 75% symmetry on Biodex Testing for LLE [x] Progressing  [] Met  [] Not Met     GAIT: Patient will demonstrate normalized gait mechanics with minimal compensation in order to return  to PLOF. [x] Progressing  [] Met  [] Not Met        Final Goals:  9 months post-op Status Date Met   PAIN: Pt will report worst pain of 1/10 in order to progress toward max functional ability and improve quality of life [x] Progressing  [] Met  [] Not Met     FUNCTION: Patient will demonstrate improved function as indicated by a functional score of greater than or equal to 90 out of 100 on FOTO. [x] Progressing  [] Met  [] Not Met     STRENGTH: Patient will improve strength to stated goals, listed in objective measures above, in order to improve functional independence and quality of life. Goal: 95% symmetry on Biodex Testing for LLE [x] Progressing  [] Met  [] Not Met     Patient will return to normal ADL's, IADL's, community involvement, recreational activities, and work-related activities including volleyball. [x] Progressing  [] Met  [] Not Met         PLAN   Continue per plan of care.  Progress as tolerated.    Iraj Huang, PT

## 2024-09-19 ENCOUNTER — CLINICAL SUPPORT (OUTPATIENT)
Facility: HOSPITAL | Age: 24
End: 2024-09-19
Payer: COMMERCIAL

## 2024-09-19 DIAGNOSIS — Z87.39 S/P ARTHROSCOPIC RECONSTRUCTION OF ACL OF LEFT KNEE USING QUADRICEPS TENDON AUTOGRAFT: ICD-10-CM

## 2024-09-19 DIAGNOSIS — M25.662 DECREASED RANGE OF MOTION OF LEFT KNEE: Primary | ICD-10-CM

## 2024-09-19 DIAGNOSIS — Z98.890 S/P ARTHROSCOPIC RECONSTRUCTION OF ACL OF LEFT KNEE USING QUADRICEPS TENDON AUTOGRAFT: ICD-10-CM

## 2024-09-19 DIAGNOSIS — M62.559 ATROPHY OF QUADRICEPS FEMORIS MUSCLE: ICD-10-CM

## 2024-09-19 DIAGNOSIS — M62.81 WEAKNESS OF LEFT QUADRICEPS MUSCLE: ICD-10-CM

## 2024-09-19 PROCEDURE — 97140 MANUAL THERAPY 1/> REGIONS: CPT | Mod: PN

## 2024-09-19 PROCEDURE — 97112 NEUROMUSCULAR REEDUCATION: CPT | Mod: PN

## 2024-09-19 PROCEDURE — 97530 THERAPEUTIC ACTIVITIES: CPT | Mod: PN

## 2024-09-19 PROCEDURE — 97014 ELECTRIC STIMULATION THERAPY: CPT | Mod: PN

## 2024-09-19 PROCEDURE — 97110 THERAPEUTIC EXERCISES: CPT | Mod: PN

## 2024-09-19 RX ORDER — HYDROCODONE BITARTRATE AND ACETAMINOPHEN 5; 325 MG/1; MG/1
1 TABLET ORAL
Qty: 20 TABLET | Refills: 0 | Status: SHIPPED | OUTPATIENT
Start: 2024-09-19

## 2024-09-19 NOTE — PATIENT INSTRUCTIONS
Assessment:  Smooth Perez is a  24 y.o. female   About to start PA School with a chief complaint of Injury of the Left Knee  8 days s/p Left knee scope, Left knee ACL reconstruction with quad tendon autograft, and lysis of adhesions   *Amnion Trial*    Encounter Diagnoses   Name Primary?    Post-op pain Yes    Sickle cell trait     Rupture of anterior cruciate ligament of left knee, subsequent encounter     Peripheral tear of medial meniscus of left knee as current injury, subsequent encounter     S/P ACL reconstruction       Plan:  Xrays taken today  Review exercises with the patient  Discussed post-op pain and protocol  Norco 5mg tabs- disp 20  Start to wean down on pain medicine.     Follow-up: Monday for post-op or sooner if there are any problems between now and then.    Leave Review:   Google: Leave Google Review  Healthgrades: Leave Healthgrades Review    After Hours Number: [phone]

## 2024-09-19 NOTE — PROGRESS NOTES
[facility]   Physical Therapy Treatment Note     Name: Smooth Perez  Clinic Number: [id number]    Therapy Diagnosis:   Encounter Diagnoses   Name Primary?    Decreased range of motion of left knee Yes    Atrophy of quadriceps femoris muscle     Weakness of left quadriceps muscle     S/P arthroscopic reconstruction of ACL of left knee using quadriceps tendon autograft      Physician: Sarkis Meza MD    Visit Date: 9/19/2024    Physician Orders: PT Eval and Treat  Medical Diagnosis from Referral: Peripheral tear of medial meniscus of left knee as current injury, subsequent encounter [S83.222D], Rupture of anterior cruciate ligament of left knee, subsequent encounter [S83.512D]   Evaluation Date: 9/12/2024  Authorization Period Expiration: 12/31/24  Plan of Care Expiration: 12/31//2024  Progress Note Due: 10/12/2024  Visit # / Visits authorized: 4/12 (1/1 eval)  FOTO: 1/3 (last performed on 9/12/2024)    Date of Surgery   9/10/24   Surgery Performed   AMNION TRIAL, RECONSTRUCTION, KNEE, ACL, ARTHROSCOPIC (Left)  LYSIS, ADHESIONS, KNEE, ARTHROSCOPIC (Left)      Post-Op Precautions   ACL recon protocol  Weight bearing: as tolerated  Range of motion: as tolerated  Antibiotics: 5 days of PO prophylactic antibiotics  DVT Ppx: Eliquis  PT/OT: Start POD#3  Follow-up: 10-14 days with AP/Lateral of operative knee (non-weightbearing)       Time In: 1:30 PM  Time Out: 3:00 PM  Total Billable Time: 90 minutes    SUBJECTIVE     Pt reports: she took pain medication prior to her visit today. She is seeing her ortho PA later today. She has no new complaints.  She was compliant with home exercise program.  Response to previous treatment: no notable change  Functional change: no notable change    Pain: 2/10  Location: left knee    OBJECTIVE     Objective Measures updated at progress report unless specified.     Treatment     Smooth received the treatments listed below:      Intervention  Code  (see below chart) Date/Notes  9/19/24   GameReady  NP   Manual Therapy  MT Patella mobs; knee PROM   Heel Prop TE NP   NMES: Quad Sets NR 5 minutes supine   NMES: SAQ NR 5 minutes   NMES: LAQ NR 5 minutes   Quad Activation NR Quad sets, LAQ, SAQ, Assisted SLR without NMES (PT assist)   AlterG TA Walking; 50-75% body weight; 15 minutes   Upright Bike TE 15 minutes for knee ROM   Heel Slides TE NP     15 minutes of Therapeutic Exercise (TE) to develop strength, endurance, ROM, and flexibility. (02809)  10 minutes of Manual therapy (MT) to improve pain and ROM. (26773)  40 minutes of Neuromuscular Re-Education (NR)  to improve: Balance, Coordination, Kinesthetic, Sense, Proprioception, and Posture. (16517)  15 minutes of Therapeutic Activities (TA) to improve functional performance. (74877)  Unattended Electrical Stimulation (ES) for muscle performance and/or pain modulation. (83943)  Vasopneumatic Device Therapy () for management of swelling/edema. (11499)  BFR: Blood flow restriction applied during exercise  NP: Not Performed    Patient Education and Home Exercises     Home Exercises Provided and Patient Education Provided     Education provided:   - HEP, PT POC    Written Home Exercises Provided: yes. Exercises were reviewed and Smooth was able to demonstrate them prior to the end of the session.  Smooth demonstrated good  understanding of the education provided. See EMR under Patient Instructions for exercises provided during therapy sessions    ASSESSMENT     Again improvement in quad activation but overall quad control remains poor. Gait improved after practice on AlterG. She will benefit from continued care.    Smooth Is progressing well towards her goals.   Pt prognosis is Excellent.     Pt will continue to benefit from skilled outpatient physical therapy to address the deficits listed in the problem list box on initial evaluation, provide pt/family education and to maximize pt's level of  independence in the home and community environment.     Pt's spiritual, cultural and educational needs considered and pt agreeable to plan of care and goals.     Anticipated barriers to physical therapy: None    Goals:  Goals:  Short Term Goals:  6 weeks Status  Date Met   PAIN: Pt will report worst pain of 4/10 in order to progress toward max functional ability and improve quality of life. [x] Progressing  [] Met  [] Not Met     FUNCTION: Patient will demonstrate improved function as indicated by a functional score of greater than or equal to 5 out of 100 on FOTO. [x] Progressing  [] Met  [] Not Met     MOBILITY: Patient will improve AROM to 50% of stated goals, listed in objective measures above, in order to progress towards independence with functional activities.  [x] Progressing  [] Met  [] Not Met     STRENGTH: Patient will improve strength to 40% of stated goals, listed in objective measures above, in order to progress towards independence with functional activities. [x] Progressing  [] Met  [] Not Met     HEP: Patient will demonstrate independence with HEP in order to progress toward functional independence. [x] Progressing  [] Met  [] Not Met        Long Term Goals:  12 weeks Status Date Met   PAIN: Pt will report worst pain of 2/10 in order to progress toward max functional ability and improve quality of life [x] Progressing  [] Met  [] Not Met     FUNCTION: Patient will demonstrate improved function as indicated by a functional score of greater than or equal to 75 out of 100 on FOTO. [x] Progressing  [] Met  [] Not Met     MOBILITY: Patient will improve AROM to stated goals, listed in objective measures above, in order to return to maximal functional potential and improve quality of life. Goal: Full L knee ROM [x] Progressing  [] Met  [] Not Met     STRENGTH: Patient will improve strength to stated goals, listed in objective measures above, in order to improve functional independence and quality of life.  Goal: 75% symmetry on Biodex Testing for LLE [x] Progressing  [] Met  [] Not Met     GAIT: Patient will demonstrate normalized gait mechanics with minimal compensation in order to return to PLOF. [x] Progressing  [] Met  [] Not Met        Final Goals:  9 months post-op Status Date Met   PAIN: Pt will report worst pain of 1/10 in order to progress toward max functional ability and improve quality of life [x] Progressing  [] Met  [] Not Met     FUNCTION: Patient will demonstrate improved function as indicated by a functional score of greater than or equal to 90 out of 100 on FOTO. [x] Progressing  [] Met  [] Not Met     STRENGTH: Patient will improve strength to stated goals, listed in objective measures above, in order to improve functional independence and quality of life. Goal: 95% symmetry on Biodex Testing for LLE [x] Progressing  [] Met  [] Not Met     Patient will return to normal ADL's, IADL's, community involvement, recreational activities, and work-related activities including volleyball. [x] Progressing  [] Met  [] Not Met         PLAN   Continue per plan of care.  Progress as tolerated.    Iraj Huang, PT

## 2024-09-23 ENCOUNTER — OFFICE VISIT (OUTPATIENT)
Dept: SPORTS MEDICINE | Facility: CLINIC | Age: 24
End: 2024-09-23
Payer: COMMERCIAL

## 2024-09-23 VITALS — HEIGHT: 62 IN | WEIGHT: 125 LBS | BODY MASS INDEX: 23 KG/M2

## 2024-09-23 DIAGNOSIS — Z98.890 S/P ACL RECONSTRUCTION: Primary | ICD-10-CM

## 2024-09-23 DIAGNOSIS — M62.81 WEAKNESS OF LEFT QUADRICEPS MUSCLE: ICD-10-CM

## 2024-09-23 DIAGNOSIS — M25.662 DECREASED RANGE OF MOTION OF LEFT KNEE: ICD-10-CM

## 2024-09-23 DIAGNOSIS — M62.559 ATROPHY OF QUADRICEPS FEMORIS MUSCLE: ICD-10-CM

## 2024-09-23 PROCEDURE — 99024 POSTOP FOLLOW-UP VISIT: CPT | Mod: S$GLB,,, | Performed by: PHYSICIAN ASSISTANT

## 2024-09-23 PROCEDURE — 99999 PR PBB SHADOW E&M-EST. PATIENT-LVL III: CPT | Mod: PBBFAC,,, | Performed by: PHYSICIAN ASSISTANT

## 2024-09-23 PROCEDURE — 1160F RVW MEDS BY RX/DR IN RCRD: CPT | Mod: CPTII,S$GLB,, | Performed by: PHYSICIAN ASSISTANT

## 2024-09-23 PROCEDURE — 1159F MED LIST DOCD IN RCRD: CPT | Mod: CPTII,S$GLB,, | Performed by: PHYSICIAN ASSISTANT

## 2024-09-23 RX ORDER — LEVONORGESTREL 19.5 MG/1
INTRAUTERINE DEVICE INTRAUTERINE
COMMUNITY

## 2024-09-23 NOTE — PATIENT INSTRUCTIONS
Assessment:  Smooth Perez is a  24 y.o. female   About to start PA School with a chief complaint of Pain and Post-op Evaluation of the Left Knee  2 weeks s/p left knee ACL reconstruction with partial thickness quad tendon autograft, and lysis of adhesions  Post-op    Encounter Diagnoses   Name Primary?    S/P ACL reconstruction Yes    Atrophy of quadriceps femoris muscle     Weakness of left quadriceps muscle     Decreased range of motion of left knee      Plan:  Wean off of crutches   Continue physical therapy at [facility] working with Iraj- aggressive range of motion and quad work  Follow up in 2 weeks to recheck range of motion    Follow-up: 2 weeks or sooner if there are any problems between now and then.    Leave Review:   Google: Leave Google Review  Healthgrades: Leave Healthgrades Review    After Hours Number: [phone]

## 2024-09-23 NOTE — PROGRESS NOTES
Patient ID: Smooth Perez  YOB: 2000  MRN: [id number]    Chief Complaint: Pain and Post-op Evaluation of the Left Knee    Referred By: Surgery patient    History of Present Illness: Smooth Perez is a  24 y.o. female   About to start PA School with a chief complaint of Pain and Post-op Evaluation of the Left Knee      Smooth Perez presents today 2 weeks s/p left knee ACL reconstruction with partial thickness quad tendon autograft, and lysis of adhesions. She presents PWB: left lower extremity with TROM & crutches brace/assistive devices. The patient has started physical therapy at [facility] with Iraj. Overall there are no issues or concerns.     Past Medical History:   Past Medical History:   Diagnosis Date    Sickle cell trait      Past Surgical History:   Procedure Laterality Date    AMNION TRIAL, RECONSTRUCTION, KNEE, ACL, ARTHROSCOPIC Left 9/10/2024    Procedure: AMNION TRIAL, RECONSTRUCTION, KNEE, ACL, ARTHROSCOPIC;  Surgeon: Sarkis Meza MD;  Location: [facility] OR;  Service: Orthopedics;  Laterality: Left;    LYSIS, ADHESIONS, KNEE, ARTHROSCOPIC Left 9/10/2024    Procedure: LYSIS, ADHESIONS, KNEE, ARTHROSCOPIC;  Surgeon: Sarkis Meza MD;  Location: [facility] OR;  Service: Orthopedics;  Laterality: Left;     No family history on file.  Social History     Socioeconomic History    Marital status: Single   Tobacco Use    Smoking status: Never     Passive exposure: Never    Smokeless tobacco: Never   Substance and Sexual Activity    Alcohol use: Not Currently    Drug use: Not Currently     Types: Marijuana    Sexual activity: Not Currently     Partners: Male     Birth control/protection: I.U.D.     Medication List with Changes/Refills   Current Medications    APIXABAN (ELIQUIS) 2.5 MG TAB    Take 1 tablet (2.5 mg total) by mouth once daily. for 21 days    DOCUSATE SODIUM (COLACE) 100 MG CAPSULE    Take 1 capsule (100 mg total) by mouth 2 (two) times daily.     HYDROCODONE-ACETAMINOPHEN (NORCO) 5-325 MG PER TABLET    Take 1 tablet by mouth every 4 to 6 hours as needed for Pain.    LEVONORGESTREL (KYLEENA) 19.5 MG IUD    Kyleena    ONDANSETRON (ZOFRAN) 4 MG TABLET    Take 1 tablet (4 mg total) by mouth every 8 (eight) hours as needed for Nausea.    VALACYCLOVIR (VALTREX) 500 MG TABLET    Take 1 tablet by mouth every morning.     Review of patient's allergies indicates:  No Known Allergies  ROS    Physical Exam:   Body mass index is 22.86 kg/m².  There were no vitals filed for this visit.   GENERAL: Well appearing, appropriate for stated age, no acute distress.  CARDIOVASCULAR: Pulses regular by peripheral palpation.  PULMONARY: Respirations are even and non-labored.  NEURO: Awake, alert, and oriented x 3.  PSYCH: Mood & affect are appropriate.  HEENT: Head is normocephalic and atraumatic.    Ortho/SPM Exam  Left Knee Exam  Sutures removed, incision sites clean dry and intact, no signs of infection  Minimal effusion   Knee ROM full extension passive  Active lacks about 15 degrees full extension  Knee flexion 90 degrees passive  Active flexion 80 degrees  All compartments are soft and compressible. Calf soft non-tender. Intact EHL, FHL, gastrocsoleus, and tibialis anterior. Sensation intact to light touch in superficial peroneal, deep peroneal, tibial, sural, and saphenous nerve distributions. Foot warm and well perfused with capillary refill of less than 2 seconds and palpable pedal pulses.       Imaging:   XR Results:  Results for orders placed during the hospital encounter of 09/18/24    X-ray Knee Ortho Left with Flexion    Narrative  EXAMINATION:  XR KNEE ORTHO LEFT WITH FLEXION    CLINICAL HISTORY:  . Other acute postprocedural pain    TECHNIQUE:  AP standing view of both knees, PA flexion standing views of both knees, and Merchant views of both knees were performed. A lateral view of the left knee was also performed.    COMPARISON:  08/21/2024    FINDINGS:  No acute  osseous abnormality.  Left knee ACL repair postsurgical findings present.  Small left knee suprapatellar joint effusion suspected with mild prepatellar soft tissue edema.    Impression  As above      Electronically signed by: Zeke Berg MD  Date:    09/18/2024  Time:    16:27    Relevant imaging results reviewed and interpreted by me, discussed with the patient and / or family today.      Patient Instructions   Assessment:  Smooth Perez is a  24 y.o. female   About to start PA School with a chief complaint of Pain and Post-op Evaluation of the Left Knee  2 weeks s/p left knee ACL reconstruction with partial thickness quad tendon autograft, and lysis of adhesions  Post-op    Encounter Diagnoses   Name Primary?    S/P ACL reconstruction Yes    Atrophy of quadriceps femoris muscle     Weakness of left quadriceps muscle     Decreased range of motion of left knee      Plan:  Wean off of crutches   Continue physical therapy at [facility] working with Iraj- aggressive range of motion and quad work  Follow up in 2 weeks to recheck range of motion    Follow-up: 2 weeks or sooner if there are any problems between now and then.    Leave Review:   Google: Leave Google Review  Healthgrades: Leave Healthgrades Review    After Hours Number: [phone]      Provider Note/Medical Decision Making:   Discussed continued quad exercises and working on range of motion      I discussed worrisome and red flag signs and symptoms with the patient. The patient expressed understanding and agreed to alert me immediately or to go to the emergency room if they experience any of these.   Treatment plan was developed with input from the patient/family, and they expressed understanding and agreement with the plan. All questions were answered today.      Trinh Carter PA-C  Sports Medicine Physician Assistant       Disclaimer: This note was prepared using a voice recognition system and is likely to have sound alike errors  within the text.

## 2024-09-24 ENCOUNTER — CLINICAL SUPPORT (OUTPATIENT)
Facility: HOSPITAL | Age: 24
End: 2024-09-24
Payer: COMMERCIAL

## 2024-09-24 DIAGNOSIS — Z98.890 S/P ARTHROSCOPIC RECONSTRUCTION OF ACL OF LEFT KNEE USING QUADRICEPS TENDON AUTOGRAFT: ICD-10-CM

## 2024-09-24 DIAGNOSIS — M25.662 DECREASED RANGE OF MOTION OF LEFT KNEE: Primary | ICD-10-CM

## 2024-09-24 DIAGNOSIS — Z87.39 S/P ARTHROSCOPIC RECONSTRUCTION OF ACL OF LEFT KNEE USING QUADRICEPS TENDON AUTOGRAFT: ICD-10-CM

## 2024-09-24 DIAGNOSIS — M62.81 WEAKNESS OF LEFT QUADRICEPS MUSCLE: ICD-10-CM

## 2024-09-24 DIAGNOSIS — M62.559 ATROPHY OF QUADRICEPS FEMORIS MUSCLE: ICD-10-CM

## 2024-09-24 PROCEDURE — 97530 THERAPEUTIC ACTIVITIES: CPT | Mod: PN

## 2024-09-24 PROCEDURE — 97110 THERAPEUTIC EXERCISES: CPT | Mod: PN

## 2024-09-24 PROCEDURE — 97016 VASOPNEUMATIC DEVICE THERAPY: CPT | Mod: PN

## 2024-09-24 PROCEDURE — 97140 MANUAL THERAPY 1/> REGIONS: CPT | Mod: PN

## 2024-09-24 PROCEDURE — 97112 NEUROMUSCULAR REEDUCATION: CPT | Mod: PN

## 2024-09-24 NOTE — PROGRESS NOTES
[facility]   Physical Therapy Treatment Note     Name: Smooth Perez  Clinic Number: [id number]    Therapy Diagnosis:   Encounter Diagnoses   Name Primary?    Decreased range of motion of left knee Yes    Atrophy of quadriceps femoris muscle     Weakness of left quadriceps muscle     S/P arthroscopic reconstruction of ACL of left knee using quadriceps tendon autograft      Physician: Sarkis Meza MD    Visit Date: 9/24/2024    Physician Orders: PT Eval and Treat  Medical Diagnosis from Referral: Peripheral tear of medial meniscus of left knee as current injury, subsequent encounter [S83.222D], Rupture of anterior cruciate ligament of left knee, subsequent encounter [S83.512D]   Evaluation Date: 9/12/2024  Authorization Period Expiration: 12/31/24  Plan of Care Expiration: 12/31//2024  Progress Note Due: 10/12/2024  Visit # / Visits authorized: 4/12 (1/1 eval)  FOTO: 1/3 (last performed on 9/12/2024)    Date of Surgery   9/10/24   Surgery Performed   AMNION TRIAL, RECONSTRUCTION, KNEE, ACL, ARTHROSCOPIC (Left)  LYSIS, ADHESIONS, KNEE, ARTHROSCOPIC (Left)      Post-Op Precautions   ACL recon protocol  Weight bearing: as tolerated  Range of motion: as tolerated  Antibiotics: 5 days of PO prophylactic antibiotics  DVT Ppx: Eliquis  PT/OT: Start POD#3  Follow-up: 10-14 days with AP/Lateral of operative knee (non-weightbearing)       Time In: 12:55 PM  Time Out: 2:25 PM  Total Billable Time: 90 minutes    SUBJECTIVE     Pt reports: she is starting to feel better overall. She reports she has been working on her quad activation.  She was compliant with home exercise program.  Response to previous treatment: no notable change  Functional change: no notable change    Pain: 2/10  Location: left knee    OBJECTIVE     Objective Measures updated at progress report unless specified.     Treatment     Smooth received the treatments listed below:      Intervention Code  (see below chart)  Date/Notes  9/24/24   GameReady  10 minutes   Manual Therapy  MT Patella mobs; knee PROM   Heel Prop TE NP   NMES: Quad Sets NR 5 minutes supine   NMES: SAQ NR 5 minutes   NMES: LAQ NR 5 minutes   Quad Activation NR Quad sets, LAQ, SAQ, Assisted SLR without NMES (PT assist)   AlterG TA Walking; 50-75% body weight; 15 minutes   Upright Bike TE 15 minutes for knee ROM   Heel Slides TE Seated with roller; 5 minutes     15 minutes of Therapeutic Exercise (TE) to develop strength, endurance, ROM, and flexibility. (65029)  10 minutes of Manual therapy (MT) to improve pain and ROM. (00314)  40 minutes of Neuromuscular Re-Education (NR)  to improve: Balance, Coordination, Kinesthetic, Sense, Proprioception, and Posture. (62807)  15 minutes of Therapeutic Activities (TA) to improve functional performance. (36575)  Unattended Electrical Stimulation (ES) for muscle performance and/or pain modulation. (89494)  Vasopneumatic Device Therapy () for management of swelling/edema. (22798)  BFR: Blood flow restriction applied during exercise  NP: Not Performed    Patient Education and Home Exercises     Home Exercises Provided and Patient Education Provided     Education provided:   - HEP, PT POC    Written Home Exercises Provided: yes. Exercises were reviewed and Smooth was able to demonstrate them prior to the end of the session.  Smooth demonstrated good  understanding of the education provided. See EMR under Patient Instructions for exercises provided during therapy sessions    ASSESSMENT     Again improvement in quad activation. She will benefit from continued care.    Smooth Is progressing well towards her goals.   Pt prognosis is Excellent.     Pt will continue to benefit from skilled outpatient physical therapy to address the deficits listed in the problem list box on initial evaluation, provide pt/family education and to maximize pt's level of independence in the home and community environment.     Pt's spiritual,  cultural and educational needs considered and pt agreeable to plan of care and goals.     Anticipated barriers to physical therapy: None    Goals:  Short Term Goals:  6 weeks Status  Date Met   PAIN: Pt will report worst pain of 4/10 in order to progress toward max functional ability and improve quality of life. [x] Progressing  [] Met  [] Not Met     FUNCTION: Patient will demonstrate improved function as indicated by a functional score of greater than or equal to 5 out of 100 on FOTO. [x] Progressing  [] Met  [] Not Met     MOBILITY: Patient will improve AROM to 50% of stated goals, listed in objective measures above, in order to progress towards independence with functional activities.  [x] Progressing  [] Met  [] Not Met     STRENGTH: Patient will improve strength to 40% of stated goals, listed in objective measures above, in order to progress towards independence with functional activities. [x] Progressing  [] Met  [] Not Met     HEP: Patient will demonstrate independence with HEP in order to progress toward functional independence. [x] Progressing  [] Met  [] Not Met        Long Term Goals:  12 weeks Status Date Met   PAIN: Pt will report worst pain of 2/10 in order to progress toward max functional ability and improve quality of life [x] Progressing  [] Met  [] Not Met     FUNCTION: Patient will demonstrate improved function as indicated by a functional score of greater than or equal to 75 out of 100 on FOTO. [x] Progressing  [] Met  [] Not Met     MOBILITY: Patient will improve AROM to stated goals, listed in objective measures above, in order to return to maximal functional potential and improve quality of life. Goal: Full L knee ROM [x] Progressing  [] Met  [] Not Met     STRENGTH: Patient will improve strength to stated goals, listed in objective measures above, in order to improve functional independence and quality of life. Goal: 75% symmetry on Biodex Testing for LLE [x] Progressing  [] Met  [] Not Met      GAIT: Patient will demonstrate normalized gait mechanics with minimal compensation in order to return to PLOF. [x] Progressing  [] Met  [] Not Met        Final Goals:  9 months post-op Status Date Met   PAIN: Pt will report worst pain of 1/10 in order to progress toward max functional ability and improve quality of life [x] Progressing  [] Met  [] Not Met     FUNCTION: Patient will demonstrate improved function as indicated by a functional score of greater than or equal to 90 out of 100 on FOTO. [x] Progressing  [] Met  [] Not Met     STRENGTH: Patient will improve strength to stated goals, listed in objective measures above, in order to improve functional independence and quality of life. Goal: 95% symmetry on Biodex Testing for LLE [x] Progressing  [] Met  [] Not Met     Patient will return to normal ADL's, IADL's, community involvement, recreational activities, and work-related activities including volleyball. [x] Progressing  [] Met  [] Not Met         PLAN   Continue per plan of care.  Progress as tolerated.    Iraj Huang, PT

## 2024-09-26 ENCOUNTER — OFFICE VISIT (OUTPATIENT)
Dept: SPORTS MEDICINE | Facility: CLINIC | Age: 24
End: 2024-09-26
Payer: COMMERCIAL

## 2024-09-26 ENCOUNTER — CLINICAL SUPPORT (OUTPATIENT)
Facility: HOSPITAL | Age: 24
End: 2024-09-26
Payer: COMMERCIAL

## 2024-09-26 DIAGNOSIS — M25.662 DECREASED RANGE OF MOTION OF LEFT KNEE: Primary | ICD-10-CM

## 2024-09-26 DIAGNOSIS — M62.81 WEAKNESS OF LEFT QUADRICEPS MUSCLE: ICD-10-CM

## 2024-09-26 DIAGNOSIS — Z87.39 S/P ARTHROSCOPIC RECONSTRUCTION OF ACL OF LEFT KNEE USING QUADRICEPS TENDON AUTOGRAFT: ICD-10-CM

## 2024-09-26 DIAGNOSIS — M62.559 ATROPHY OF QUADRICEPS FEMORIS MUSCLE: ICD-10-CM

## 2024-09-26 DIAGNOSIS — Z98.890 S/P ARTHROSCOPIC RECONSTRUCTION OF ACL OF LEFT KNEE USING QUADRICEPS TENDON AUTOGRAFT: ICD-10-CM

## 2024-09-26 DIAGNOSIS — Z98.890 S/P ACL RECONSTRUCTION: Primary | ICD-10-CM

## 2024-09-26 DIAGNOSIS — D57.3 SICKLE CELL TRAIT: ICD-10-CM

## 2024-09-26 PROCEDURE — 1159F MED LIST DOCD IN RCRD: CPT | Mod: CPTII,S$GLB,, | Performed by: PHYSICIAN ASSISTANT

## 2024-09-26 PROCEDURE — 99999 PR PBB SHADOW E&M-EST. PATIENT-LVL III: CPT | Mod: PBBFAC,,, | Performed by: PHYSICIAN ASSISTANT

## 2024-09-26 PROCEDURE — 1160F RVW MEDS BY RX/DR IN RCRD: CPT | Mod: CPTII,S$GLB,, | Performed by: PHYSICIAN ASSISTANT

## 2024-09-26 PROCEDURE — 97112 NEUROMUSCULAR REEDUCATION: CPT | Mod: PN

## 2024-09-26 PROCEDURE — 97530 THERAPEUTIC ACTIVITIES: CPT | Mod: PN

## 2024-09-26 PROCEDURE — 97140 MANUAL THERAPY 1/> REGIONS: CPT | Mod: PN

## 2024-09-26 PROCEDURE — 97016 VASOPNEUMATIC DEVICE THERAPY: CPT | Mod: PN

## 2024-09-26 PROCEDURE — 99024 POSTOP FOLLOW-UP VISIT: CPT | Mod: S$GLB,,, | Performed by: PHYSICIAN ASSISTANT

## 2024-09-26 PROCEDURE — 97014 ELECTRIC STIMULATION THERAPY: CPT | Mod: PN

## 2024-09-26 PROCEDURE — 97110 THERAPEUTIC EXERCISES: CPT | Mod: PN

## 2024-09-26 NOTE — PROGRESS NOTES
Patient ID: Smooth Perez  YOB: 2000  MRN: [id number]    Chief Complaint: Pain of the Left Knee    Referred By: [facility]    History of Present Illness: Smooth Perez is a  24 y.o. female   About to start [employer] School with a chief complaint of Pain of the Left Knee      Smooth Perez is a 24 y.o. presents today for evaluation and management of left knee pain.   2 weeks status post left knee ACL reconstruction with partial thickness quad tendon autograft and lysis of adhesions on 9/10/24.  Patient presents today for recheck on range of motion due to research study. At this time she denies any fevers, chills, night sweats, numbness and tingles.    Recall previous HPI on 9/4/24:   Smooth presents today for follow up of her left knee. She has been attending physical therapy at [facility] with Iraj 2x a week. She reports a 5/10 knee pain today. She presents today to discuss surgical intervention. Of note she has been consented for the amnion research study.      Recall from 8/21/24: Smooth presents today with left knee pain and swelling. She reports an injured 7/20 while playing football in [place]. She reports her knee gave out when she went to [facility] and felt a pop. She was evaluated in [place] where she was for the summer with an MRI. She was told she had an ACL surgery. She has not started rehab yet due to returning to [place] and travel. She reports continued instabiltiy in her knee at this time.       Past Medical History:   Past Medical History:   Diagnosis Date    Sickle cell trait      Past Surgical History:   Procedure Laterality Date    AMNION TRIAL, RECONSTRUCTION, KNEE, ACL, ARTHROSCOPIC Left 9/10/2024    Procedure: AMNION TRIAL, RECONSTRUCTION, KNEE, ACL, ARTHROSCOPIC;  Surgeon: Sarkis Meza MD;  Location: [facility];  Service: Orthopedics;  Laterality: Left;    LYSIS, ADHESIONS, KNEE, ARTHROSCOPIC Left 9/10/2024    Procedure: LYSIS, ADHESIONS, KNEE,  ARTHROSCOPIC;  Surgeon: Sarkis Meza MD;  Location: [facility];  Service: Orthopedics;  Laterality: Left;     No family history on file.  Social History     Socioeconomic History    Marital status: Single   Tobacco Use    Smoking status: Never     Passive exposure: Never    Smokeless tobacco: Never   Substance and Sexual Activity    Alcohol use: Not Currently    Drug use: Not Currently     Types: Marijuana    Sexual activity: Not Currently     Partners: Male     Birth control/protection: I.U.D.     Medication List with Changes/Refills   Current Medications    APIXABAN (ELIQUIS) 2.5 MG TAB    Take 1 tablet (2.5 mg total) by mouth once daily. for 21 days    DOCUSATE SODIUM (COLACE) 100 MG CAPSULE    Take 1 capsule (100 mg total) by mouth 2 (two) times daily.    HYDROCODONE-ACETAMINOPHEN (NORCO) 5-325 MG PER TABLET    Take 1 tablet by mouth every 4 to 6 hours as needed for Pain.    LEVONORGESTREL (KYLEENA) 19.5 MG IUD    Kyleena    ONDANSETRON (ZOFRAN) 4 MG TABLET    Take 1 tablet (4 mg total) by mouth every 8 (eight) hours as needed for Nausea.    VALACYCLOVIR (VALTREX) 500 MG TABLET    Take 1 tablet by mouth every morning.     Review of patient's allergies indicates:  No Known Allergies  Review of Systems   Constitutional: Negative for chills and fever.   HENT:  Negative for sore throat.    Eyes:  Negative for pain.   Cardiovascular:  Negative for chest pain and leg swelling.   Respiratory:  Negative for cough and shortness of breath.    Skin:  Negative for itching and rash.   Musculoskeletal:  Positive for joint pain, joint swelling and myalgias.   Gastrointestinal:  Negative for abdominal pain, nausea and vomiting.   Genitourinary:  Negative for dysuria.   Neurological:  Negative for dizziness, numbness and paresthesias.       Physical Exam:   There is no height or weight on file to calculate BMI.  There were no vitals filed for this visit.   GENERAL: Well appearing, appropriate for stated age, no acute  distress.  CARDIOVASCULAR: Pulses regular by peripheral palpation.  PULMONARY: Respirations are even and non-labored.  NEURO: Awake, alert, and oriented x 3.  PSYCH: Mood & affect are appropriate.  HEENT: Head is normocephalic and atraumatic.  Ortho/SPM Exam  Left knee  Passive Extension +5  Active extension +20  Flexion passive 99  Flexion active 92  Moderate effusion  No signs of symptoms of infection  No active bleeding    Imaging:    X-ray Knee Ortho Left with Flexion  Narrative: EXAMINATION:  XR KNEE ORTHO LEFT WITH FLEXION    CLINICAL HISTORY:  . Other acute postprocedural pain    TECHNIQUE:  AP standing view of both knees, PA flexion standing views of both knees, and Merchant views of both knees were performed. A lateral view of the left knee was also performed.    COMPARISON:  08/21/2024    FINDINGS:  No acute osseous abnormality.  Left knee ACL repair postsurgical findings present.  Small left knee suprapatellar joint effusion suspected with mild prepatellar soft tissue edema.  Impression: As above    Electronically signed by: Zeke Berg MD  Date:    09/18/2024  Time:    16:27      Relevant imaging results reviewed and interpreted by me, discussed with the patient and / or family today.     Other Tests:       Patient Instructions   Assessment:  Smooth Perez is a 24 y.o. 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  Amnion study trial     Encounter Diagnoses   Name Primary?    S/P ACL reconstruction Yes    Atrophy of quadriceps femoris muscle     Weakness of left quadriceps muscle     Sickle cell trait       Plan:  Continue physical therapy with Iraj at [facility]   [facility] research paperwork filled out today with Iraj     Follow-up:  At 4 weeks post-op or sooner if there are any problems between now and then.    Leave Review:   Google: Leave Google  Review  Healthgrades: Leave Healthgrades Review    After Hours Number: [phone]      Provider Note/Medical Decision Making:   Research paperwork addressed at today's visit.    I discussed worrisome and red flag signs and symptoms with the patient. The patient expressed understanding and agreed to alert me immediately or to go to the emergency room if they experience any of these.   Treatment plan was developed with input from the patient/family, and they expressed understanding and agreement with the plan. All questions were answered today.      Trinh Carter PA-C  Sports Medicine Physician Assistant       Disclaimer: This note was prepared using a voice recognition system and is likely to have sound alike errors within the text.

## 2024-09-26 NOTE — PATIENT INSTRUCTIONS
Assessment:  Smooth Perez is a 24 y.o. 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  Amnion study trial     Encounter Diagnoses   Name Primary?    S/P ACL reconstruction Yes    Atrophy of quadriceps femoris muscle     Weakness of left quadriceps muscle     Sickle cell trait       Plan:  Continue physical therapy with Iraj at [facility] Amnion research paperwork filled out today with Iraj     Follow-up:  At 4 weeks post-op or sooner if there are any problems between now and then.    Leave Review:   Google: Leave Google Review  Healthgrades: Leave Healthgrades Review    After Hours Number: [phone]

## 2024-09-27 NOTE — PROGRESS NOTES
[facility]   Physical Therapy Treatment Note     Name: Smooth Perez  Clinic Number: [id number]    Therapy Diagnosis:   Encounter Diagnoses   Name Primary?    Decreased range of motion of left knee Yes    Atrophy of quadriceps femoris muscle     Weakness of left quadriceps muscle     S/P arthroscopic reconstruction of ACL of left knee using quadriceps tendon autograft      Physician: Sarkis Meza MD    Visit Date: 9/26/2024    Physician Orders: PT Eval and Treat  Medical Diagnosis from Referral: Peripheral tear of medial meniscus of left knee as current injury, subsequent encounter [S83.222D], Rupture of anterior cruciate ligament of left knee, subsequent encounter [S83.512D]   Evaluation Date: 9/12/2024  Authorization Period Expiration: 12/31/24  Plan of Care Expiration: 12/31//2024  Progress Note Due: 10/12/2024  Visit # / Visits authorized: 6/12 (1/1 eval)  FOTO: 1/3 (last performed on 9/12/2024)    Date of Surgery   9/10/24   Surgery Performed   AMNION TRIAL, RECONSTRUCTION, KNEE, ACL, ARTHROSCOPIC (Left)  LYSIS, ADHESIONS, KNEE, ARTHROSCOPIC (Left)      Post-Op Precautions   ACL recon protocol  Weight bearing: as tolerated  Range of motion: as tolerated  Antibiotics: 5 days of PO prophylactic antibiotics  DVT Ppx: Eliquis  PT/OT: Start POD#3  Follow-up: 10-14 days with AP/Lateral of operative knee (non-weightbearing)       Time In: 1:30 PM  Time Out: 3:00 PM  Total Billable Time: 90 minutes    SUBJECTIVE     Pt reports: she has not been working on her heel prop. She reports she has been trying to do some quad activation work.  She was compliant with home exercise program.  Response to previous treatment: no notable change  Functional change: no notable change    Pain: 2/10  Location: left knee    OBJECTIVE     Objective Measures updated at progress report unless specified.     Treatment     Smooth received the treatments listed below:      Intervention Code  (see below  chart) Date/Notes  9/26/24   GameReady  10 minutes   Manual Therapy  MT Patella mobs; knee PROM   Heel Prop TE NP   NMES: Quad Sets NR 5 minutes supine   NMES: SAQ NR 5 minutes   NMES: LAQ NR 5 minutes   Quad Activation NR Quad sets, LAQ, SAQ, Assisted SLR without NMES (PT assist)   AlterG TA Walking; 50-75% body weight; 15 minutes   Upright Bike TE 15 minutes for knee ROM   Heel Slides TE Seated with roller; 5 minutes     15 minutes of Therapeutic Exercise (TE) to develop strength, endurance, ROM, and flexibility. (12351)  10 minutes of Manual therapy (MT) to improve pain and ROM. (99642)  40 minutes of Neuromuscular Re-Education (NR)  to improve: Balance, Coordination, Kinesthetic, Sense, Proprioception, and Posture. (53924)  15 minutes of Therapeutic Activities (TA) to improve functional performance. (90202)  Unattended Electrical Stimulation (ES) for muscle performance and/or pain modulation. (26897)  Vasopneumatic Device Therapy () for management of swelling/edema. (90487)  BFR: Blood flow restriction applied during exercise  NP: Not Performed    Patient Education and Home Exercises     Home Exercises Provided and Patient Education Provided     Education provided:   - HEP, PT POC    Written Home Exercises Provided: yes. Exercises were reviewed and Smooth was able to demonstrate them prior to the end of the session.  Smooth demonstrated good  understanding of the education provided. See EMR under Patient Instructions for exercises provided during therapy sessions    ASSESSMENT     Regression in knee extension range of motion today, encouraged improved compliance with heel prop exercise at home. Minor improvement in quad activation. She will benefit from continued care.    Smooth Is progressing well towards her goals.   Pt prognosis is Excellent.     Pt will continue to benefit from skilled outpatient physical therapy to address the deficits listed in the problem list box on initial evaluation, provide  pt/family education and to maximize pt's level of independence in the home and community environment.     Pt's spiritual, cultural and educational needs considered and pt agreeable to plan of care and goals.     Anticipated barriers to physical therapy: None    Goals:  Short Term Goals:  6 weeks Status  Date Met   PAIN: Pt will report worst pain of 4/10 in order to progress toward max functional ability and improve quality of life. [x] Progressing  [] Met  [] Not Met     FUNCTION: Patient will demonstrate improved function as indicated by a functional score of greater than or equal to 5 out of 100 on FOTO. [x] Progressing  [] Met  [] Not Met     MOBILITY: Patient will improve AROM to 50% of stated goals, listed in objective measures above, in order to progress towards independence with functional activities.  [x] Progressing  [] Met  [] Not Met     STRENGTH: Patient will improve strength to 40% of stated goals, listed in objective measures above, in order to progress towards independence with functional activities. [x] Progressing  [] Met  [] Not Met     HEP: Patient will demonstrate independence with HEP in order to progress toward functional independence. [x] Progressing  [] Met  [] Not Met        Long Term Goals:  12 weeks Status Date Met   PAIN: Pt will report worst pain of 2/10 in order to progress toward max functional ability and improve quality of life [x] Progressing  [] Met  [] Not Met     FUNCTION: Patient will demonstrate improved function as indicated by a functional score of greater than or equal to 75 out of 100 on FOTO. [x] Progressing  [] Met  [] Not Met     MOBILITY: Patient will improve AROM to stated goals, listed in objective measures above, in order to return to maximal functional potential and improve quality of life. Goal: Full L knee ROM [x] Progressing  [] Met  [] Not Met     STRENGTH: Patient will improve strength to stated goals, listed in objective measures above, in order to improve  functional independence and quality of life. Goal: 75% symmetry on Biodex Testing for LLE [x] Progressing  [] Met  [] Not Met     GAIT: Patient will demonstrate normalized gait mechanics with minimal compensation in order to return to PLOF. [x] Progressing  [] Met  [] Not Met        Final Goals:  9 months post-op Status Date Met   PAIN: Pt will report worst pain of 1/10 in order to progress toward max functional ability and improve quality of life [x] Progressing  [] Met  [] Not Met     FUNCTION: Patient will demonstrate improved function as indicated by a functional score of greater than or equal to 90 out of 100 on FOTO. [x] Progressing  [] Met  [] Not Met     STRENGTH: Patient will improve strength to stated goals, listed in objective measures above, in order to improve functional independence and quality of life. Goal: 95% symmetry on Biodex Testing for LLE [x] Progressing  [] Met  [] Not Met     Patient will return to normal ADL's, IADL's, community involvement, recreational activities, and work-related activities including volleyball. [x] Progressing  [] Met  [] Not Met         PLAN   Continue per plan of care.  Progress as tolerated.    Iraj Huang, PT

## 2024-10-01 ENCOUNTER — CLINICAL SUPPORT (OUTPATIENT)
Facility: HOSPITAL | Age: 24
End: 2024-10-01
Payer: COMMERCIAL

## 2024-10-01 DIAGNOSIS — M62.559 ATROPHY OF QUADRICEPS FEMORIS MUSCLE: ICD-10-CM

## 2024-10-01 DIAGNOSIS — M25.662 DECREASED RANGE OF MOTION OF LEFT KNEE: Primary | ICD-10-CM

## 2024-10-01 DIAGNOSIS — M62.81 WEAKNESS OF LEFT QUADRICEPS MUSCLE: ICD-10-CM

## 2024-10-01 DIAGNOSIS — Z87.39 S/P ARTHROSCOPIC RECONSTRUCTION OF ACL OF LEFT KNEE USING QUADRICEPS TENDON AUTOGRAFT: ICD-10-CM

## 2024-10-01 DIAGNOSIS — Z98.890 S/P ARTHROSCOPIC RECONSTRUCTION OF ACL OF LEFT KNEE USING QUADRICEPS TENDON AUTOGRAFT: ICD-10-CM

## 2024-10-01 PROCEDURE — 97140 MANUAL THERAPY 1/> REGIONS: CPT | Mod: PN | Performed by: PHYSICAL THERAPIST

## 2024-10-01 PROCEDURE — 97016 VASOPNEUMATIC DEVICE THERAPY: CPT | Mod: PN | Performed by: PHYSICAL THERAPIST

## 2024-10-01 PROCEDURE — 97014 ELECTRIC STIMULATION THERAPY: CPT | Mod: PN | Performed by: PHYSICAL THERAPIST

## 2024-10-01 PROCEDURE — 97112 NEUROMUSCULAR REEDUCATION: CPT | Mod: PN | Performed by: PHYSICAL THERAPIST

## 2024-10-01 PROCEDURE — 97110 THERAPEUTIC EXERCISES: CPT | Mod: PN | Performed by: PHYSICAL THERAPIST

## 2024-10-01 NOTE — PROGRESS NOTES
[facility]   Physical Therapy Treatment Note     Name: Smooth Perez  Clinic Number: [id number]    Therapy Diagnosis:   Encounter Diagnoses   Name Primary?    Decreased range of motion of left knee Yes    Atrophy of quadriceps femoris muscle     Weakness of left quadriceps muscle     S/P arthroscopic reconstruction of ACL of left knee using quadriceps tendon autograft      Physician: Sarkis Meza MD    Visit Date: 10/1/2024    Physician Orders: PT Eval and Treat  Medical Diagnosis from Referral: Peripheral tear of medial meniscus of left knee as current injury, subsequent encounter [S83.222D], Rupture of anterior cruciate ligament of left knee, subsequent encounter [S83.512D]   Evaluation Date: 9/12/2024  Authorization Period Expiration: 12/31/24  Plan of Care Expiration: 12/31//2024  Progress Note Due: 10/12/2024  Visit # / Visits authorized: 7/12 (1/1 eval)  FOTO: 1/3 (last performed on 9/12/2024)    Date of Surgery   9/10/24   Surgery Performed   AMNION TRIAL, RECONSTRUCTION, KNEE, ACL, ARTHROSCOPIC (Left)  LYSIS, ADHESIONS, KNEE, ARTHROSCOPIC (Left)      Post-Op Precautions   ACL recon protocol  Weight bearing: as tolerated  Range of motion: as tolerated  Antibiotics: 5 days of PO prophylactic antibiotics  DVT Ppx: Eliquis  PT/OT: Start POD#3  Follow-up: 10-14 days with AP/Lateral of operative knee (non-weightbearing)       Time In: 11:00 PM  Time Out: 12:30 PM  Total Billable Time: 90 minutes    SUBJECTIVE     Pt reports: she is doing well today. She reports she has tried to be more consistent with home exercises.  She was compliant with home exercise program.  Response to previous treatment: no notable change  Functional change: no notable change    Pain: 2/10  Location: left knee    OBJECTIVE     Objective Measures updated at progress report unless specified.     Treatment     Smooth received the treatments listed below:      Intervention Code  (see below chart)  10/1/24   Upright Bike TE 10 minutes   GameReady  10 minutes   Manual Therapy  MT Patella mobs; knee PROM   Heel Prop TE 5 minutes; 10#   NMES: Quad Sets NR 10 minutes; 10/10; biofeedback cuff under knee   Biodex Patellar Tendon Isometric Protocol NR 20 minutes   NMES: SAQ NR NP   NMES: LAQ NR NP   Quad Activation NR Assisted Quad sets and SAQ   AlterG TA NP   Heel Slides TE NP     25 minutes of Therapeutic Exercise (TE) to develop strength, endurance, ROM, and flexibility. (72481)  10 minutes of Manual therapy (MT) to improve pain and ROM. (43130)  40 minutes of Neuromuscular Re-Education (NR)  to improve: Balance, Coordination, Kinesthetic, Sense, Proprioception, and Posture. (26075)  0 minutes of Therapeutic Activities (TA) to improve functional performance. (71441)   Unattended Electrical Stimulation (ES) for muscle performance and/or pain modulation. (49356)  Vasopneumatic Device Therapy () for management of swelling/edema. (03018)  BFR: Blood flow restriction applied during exercise  NP: Not Performed    Patient Education and Home Exercises     Home Exercises Provided and Patient Education Provided     Education provided:   - HEP, PT POC    Written Home Exercises Provided: yes. Exercises were reviewed and Smooth was able to demonstrate them prior to the end of the session.  Smooth demonstrated good  understanding of the education provided. See EMR under Patient Instructions for exercises provided during therapy sessions    ASSESSMENT     Significant improvement in quad activation after Biodex protocol. Continue to promote quad activation and full knee extension. She will benefit from continued physical therapy care.    Smooth Is progressing well towards her goals.   Pt prognosis is Excellent.     Pt will continue to benefit from skilled outpatient physical therapy to address the deficits listed in the problem list box on initial evaluation, provide pt/family education and to maximize pt's level of  independence in the home and community environment.     Pt's spiritual, cultural and educational needs considered and pt agreeable to plan of care and goals.     Anticipated barriers to physical therapy: None    Goals:  Short Term Goals:  6 weeks Status  Date Met   PAIN: Pt will report worst pain of 4/10 in order to progress toward max functional ability and improve quality of life. [x] Progressing  [] Met  [] Not Met     FUNCTION: Patient will demonstrate improved function as indicated by a functional score of greater than or equal to 5 out of 100 on FOTO. [x] Progressing  [] Met  [] Not Met     MOBILITY: Patient will improve AROM to 50% of stated goals, listed in objective measures above, in order to progress towards independence with functional activities.  [x] Progressing  [] Met  [] Not Met     STRENGTH: Patient will improve strength to 40% of stated goals, listed in objective measures above, in order to progress towards independence with functional activities. [x] Progressing  [] Met  [] Not Met     HEP: Patient will demonstrate independence with HEP in order to progress toward functional independence. [x] Progressing  [] Met  [] Not Met        Long Term Goals:  12 weeks Status Date Met   PAIN: Pt will report worst pain of 2/10 in order to progress toward max functional ability and improve quality of life [x] Progressing  [] Met  [] Not Met     FUNCTION: Patient will demonstrate improved function as indicated by a functional score of greater than or equal to 75 out of 100 on FOTO. [x] Progressing  [] Met  [] Not Met     MOBILITY: Patient will improve AROM to stated goals, listed in objective measures above, in order to return to maximal functional potential and improve quality of life. Goal: Full L knee ROM [x] Progressing  [] Met  [] Not Met     STRENGTH: Patient will improve strength to stated goals, listed in objective measures above, in order to improve functional independence and quality of life. Goal:  75% symmetry on Biodex Testing for LLE [x] Progressing  [] Met  [] Not Met     GAIT: Patient will demonstrate normalized gait mechanics with minimal compensation in order to return to PLOF. [x] Progressing  [] Met  [] Not Met        Final Goals:  9 months post-op Status Date Met   PAIN: Pt will report worst pain of 1/10 in order to progress toward max functional ability and improve quality of life [x] Progressing  [] Met  [] Not Met     FUNCTION: Patient will demonstrate improved function as indicated by a functional score of greater than or equal to 90 out of 100 on FOTO. [x] Progressing  [] Met  [] Not Met     STRENGTH: Patient will improve strength to stated goals, listed in objective measures above, in order to improve functional independence and quality of life. Goal: 95% symmetry on Biodex Testing for LLE [x] Progressing  [] Met  [] Not Met     Patient will return to normal ADL's, IADL's, community involvement, recreational activities, and work-related activities including volleyball. [x] Progressing  [] Met  [] Not Met         PLAN   Continue per plan of care.  Progress as tolerated.    Iraj Huang, PT

## 2024-10-03 ENCOUNTER — CLINICAL SUPPORT (OUTPATIENT)
Facility: HOSPITAL | Age: 24
End: 2024-10-03
Payer: COMMERCIAL

## 2024-10-03 DIAGNOSIS — M62.559 ATROPHY OF QUADRICEPS FEMORIS MUSCLE: ICD-10-CM

## 2024-10-03 DIAGNOSIS — M62.81 WEAKNESS OF LEFT QUADRICEPS MUSCLE: ICD-10-CM

## 2024-10-03 DIAGNOSIS — Z98.890 S/P ARTHROSCOPIC RECONSTRUCTION OF ACL OF LEFT KNEE USING QUADRICEPS TENDON AUTOGRAFT: ICD-10-CM

## 2024-10-03 DIAGNOSIS — M25.662 DECREASED RANGE OF MOTION OF LEFT KNEE: Primary | ICD-10-CM

## 2024-10-03 DIAGNOSIS — Z87.39 S/P ARTHROSCOPIC RECONSTRUCTION OF ACL OF LEFT KNEE USING QUADRICEPS TENDON AUTOGRAFT: ICD-10-CM

## 2024-10-03 PROCEDURE — 97140 MANUAL THERAPY 1/> REGIONS: CPT | Mod: PN | Performed by: PHYSICAL THERAPIST

## 2024-10-03 PROCEDURE — 97016 VASOPNEUMATIC DEVICE THERAPY: CPT | Mod: PN | Performed by: PHYSICAL THERAPIST

## 2024-10-03 PROCEDURE — 97110 THERAPEUTIC EXERCISES: CPT | Mod: PN | Performed by: PHYSICAL THERAPIST

## 2024-10-03 PROCEDURE — 97112 NEUROMUSCULAR REEDUCATION: CPT | Mod: PN | Performed by: PHYSICAL THERAPIST

## 2024-10-03 PROCEDURE — 97014 ELECTRIC STIMULATION THERAPY: CPT | Mod: PN | Performed by: PHYSICAL THERAPIST

## 2024-10-04 NOTE — PROGRESS NOTES
[facility]   Physical Therapy Treatment Note     Name: Smooth Perez  Clinic Number: [id number]    Therapy Diagnosis:   Encounter Diagnoses   Name Primary?    Decreased range of motion of left knee Yes    Atrophy of quadriceps femoris muscle     Weakness of left quadriceps muscle     S/P arthroscopic reconstruction of ACL of left knee using quadriceps tendon autograft      Physician: Sarkis Meza MD    Visit Date: 10/3/2024    Physician Orders: PT Eval and Treat  Medical Diagnosis from Referral: Peripheral tear of medial meniscus of left knee as current injury, subsequent encounter [S83.222D], Rupture of anterior cruciate ligament of left knee, subsequent encounter [S83.512D]   Evaluation Date: 9/12/2024  Authorization Period Expiration: 12/31/24  Plan of Care Expiration: 12/31//2024  Progress Note Due: 10/12/2024  Visit # / Visits authorized: 8/12 (1/1 eval)  FOTO: 1/3 (last performed on 9/12/2024)    Date of Surgery   9/10/24   Surgery Performed   AMNION TRIAL, RECONSTRUCTION, KNEE, ACL, ARTHROSCOPIC (Left)  LYSIS, ADHESIONS, KNEE, ARTHROSCOPIC (Left)      Post-Op Precautions   ACL recon protocol  Weight bearing: as tolerated  Range of motion: as tolerated  Antibiotics: 5 days of PO prophylactic antibiotics  DVT Ppx: Eliquis  PT/OT: Start POD#3  Follow-up: 10-14 days with AP/Lateral of operative knee (non-weightbearing)       Time In: 1:00 PM  Time Out: 2:30 PM  Total Billable Time: 90 minutes    SUBJECTIVE     Pt reports: she has been working more at home on her quad and feels like she is making good progress.  She was compliant with home exercise program.  Response to previous treatment: no notable change  Functional change: no notable change    Pain: 2/10  Location: left knee    OBJECTIVE     Objective Measures updated at progress report unless specified.     Treatment     Smooth received the treatments listed below:      Intervention Code  (see below chart) 10/3/24    Upright Bike TE 10 minutes   Biodex Patellar Tendon Isometric Protocol TE 20 minutes   GameReady  10 minutes   Manual Therapy  MT Patella mobs; knee PROM to improve flexion and extension   Heel Prop TE 5 minutes; 10#   NMES: Quad Set NR 5 minutes   NMES: LAQ with strap NR 5 minutes   NMES: SAQ NR 5 minutes   Single Leg Shuttle Squats NR 1 band; 3x10     25 minutes of Therapeutic Exercise (TE) to develop strength, endurance, ROM, and flexibility. (03048)  10 minutes of Manual therapy (MT) to improve pain and ROM. (67451)  40 minutes of Neuromuscular Re-Education (NR)  to improve: Balance, Coordination, Kinesthetic, Sense, Proprioception, and Posture. (28507)  0 minutes of Therapeutic Activities (TA) to improve functional performance. (14397)   Unattended Electrical Stimulation (ES) for muscle performance and/or pain modulation. (59938)  Vasopneumatic Device Therapy () for management of swelling/edema. (78188)  BFR: Blood flow restriction applied during exercise  NP: Not Performed    Patient Education and Home Exercises     Home Exercises Provided and Patient Education Provided     Education provided:   - HEP, PT POC    Written Home Exercises Provided: yes. Exercises were reviewed and Smooth was able to demonstrate them prior to the end of the session.  Smooth demonstrated good  understanding of the education provided. See EMR under Patient Instructions for exercises provided during therapy sessions    ASSESSMENT     Again significant improvement in quadriceps activation compared to prior visit. Knee extension range of motion improved as well. She will benefit from continued care.    Smooth Is progressing well towards her goals.   Pt prognosis is Excellent.     Pt will continue to benefit from skilled outpatient physical therapy to address the deficits listed in the problem list box on initial evaluation, provide pt/family education and to maximize pt's level of independence in the home and community  environment.     Pt's spiritual, cultural and educational needs considered and pt agreeable to plan of care and goals.     Anticipated barriers to physical therapy: None    Goals:  Short Term Goals:  6 weeks Status  Date Met   PAIN: Pt will report worst pain of 4/10 in order to progress toward max functional ability and improve quality of life. [x] Progressing  [] Met  [] Not Met     FUNCTION: Patient will demonstrate improved function as indicated by a functional score of greater than or equal to 5 out of 100 on FOTO. [x] Progressing  [] Met  [] Not Met     MOBILITY: Patient will improve AROM to 50% of stated goals, listed in objective measures above, in order to progress towards independence with functional activities.  [x] Progressing  [] Met  [] Not Met     STRENGTH: Patient will improve strength to 40% of stated goals, listed in objective measures above, in order to progress towards independence with functional activities. [x] Progressing  [] Met  [] Not Met     HEP: Patient will demonstrate independence with HEP in order to progress toward functional independence. [x] Progressing  [] Met  [] Not Met        Long Term Goals:  12 weeks Status Date Met   PAIN: Pt will report worst pain of 2/10 in order to progress toward max functional ability and improve quality of life [x] Progressing  [] Met  [] Not Met     FUNCTION: Patient will demonstrate improved function as indicated by a functional score of greater than or equal to 75 out of 100 on FOTO. [x] Progressing  [] Met  [] Not Met     MOBILITY: Patient will improve AROM to stated goals, listed in objective measures above, in order to return to maximal functional potential and improve quality of life. Goal: Full L knee ROM [x] Progressing  [] Met  [] Not Met     STRENGTH: Patient will improve strength to stated goals, listed in objective measures above, in order to improve functional independence and quality of life. Goal: 75% symmetry on Biodex Testing for LLE  [x] Progressing  [] Met  [] Not Met     GAIT: Patient will demonstrate normalized gait mechanics with minimal compensation in order to return to PLOF. [x] Progressing  [] Met  [] Not Met        Final Goals:  9 months post-op Status Date Met   PAIN: Pt will report worst pain of 1/10 in order to progress toward max functional ability and improve quality of life [x] Progressing  [] Met  [] Not Met     FUNCTION: Patient will demonstrate improved function as indicated by a functional score of greater than or equal to 90 out of 100 on FOTO. [x] Progressing  [] Met  [] Not Met     STRENGTH: Patient will improve strength to stated goals, listed in objective measures above, in order to improve functional independence and quality of life. Goal: 95% symmetry on Biodex Testing for LLE [x] Progressing  [] Met  [] Not Met     Patient will return to normal ADL's, IADL's, community involvement, recreational activities, and work-related activities including volleyball. [x] Progressing  [] Met  [] Not Met         PLAN   Continue per plan of care.  Progress as tolerated.    Iraj Huang, PT

## 2024-10-08 ENCOUNTER — CLINICAL SUPPORT (OUTPATIENT)
Facility: HOSPITAL | Age: 24
End: 2024-10-08
Payer: COMMERCIAL

## 2024-10-08 DIAGNOSIS — M25.662 DECREASED RANGE OF MOTION OF LEFT KNEE: Primary | ICD-10-CM

## 2024-10-08 DIAGNOSIS — M62.81 WEAKNESS OF LEFT QUADRICEPS MUSCLE: ICD-10-CM

## 2024-10-08 DIAGNOSIS — Z87.39 S/P ARTHROSCOPIC RECONSTRUCTION OF ACL OF LEFT KNEE USING QUADRICEPS TENDON AUTOGRAFT: ICD-10-CM

## 2024-10-08 DIAGNOSIS — Z98.890 S/P ARTHROSCOPIC RECONSTRUCTION OF ACL OF LEFT KNEE USING QUADRICEPS TENDON AUTOGRAFT: ICD-10-CM

## 2024-10-08 DIAGNOSIS — M62.559 ATROPHY OF QUADRICEPS FEMORIS MUSCLE: ICD-10-CM

## 2024-10-08 PROCEDURE — 97112 NEUROMUSCULAR REEDUCATION: CPT | Mod: PN | Performed by: PHYSICAL THERAPIST

## 2024-10-08 PROCEDURE — 97140 MANUAL THERAPY 1/> REGIONS: CPT | Mod: PN | Performed by: PHYSICAL THERAPIST

## 2024-10-08 PROCEDURE — 97110 THERAPEUTIC EXERCISES: CPT | Mod: PN | Performed by: PHYSICAL THERAPIST

## 2024-10-08 NOTE — PROGRESS NOTES
[facility]   Physical Therapy Treatment Note     Name: Smooth Perez  Clinic Number: [id number]    Therapy Diagnosis:   Encounter Diagnoses   Name Primary?    Decreased range of motion of left knee Yes    Atrophy of quadriceps femoris muscle     Weakness of left quadriceps muscle     S/P arthroscopic reconstruction of ACL of left knee using quadriceps tendon autograft      Physician: Sarkis Meza MD    Visit Date: 10/8/2024    Physician Orders: PT Eval and Treat  Medical Diagnosis from Referral: Peripheral tear of medial meniscus of left knee as current injury, subsequent encounter [S83.222D], Rupture of anterior cruciate ligament of left knee, subsequent encounter [S83.512D]   Evaluation Date: 9/12/2024  Authorization Period Expiration: 12/31/24  Plan of Care Expiration: 12/31//2024  Progress Note Due: 10/12/2024  Visit # / Visits authorized: 9/12 (1/1 eval)  FOTO: 1/3 (last performed on 9/12/2024)    Date of Surgery   9/10/24   Surgery Performed   AMNION TRIAL, RECONSTRUCTION, KNEE, ACL, ARTHROSCOPIC (Left)  LYSIS, ADHESIONS, KNEE, ARTHROSCOPIC (Left)      Post-Op Precautions   ACL recon protocol  Weight bearing: as tolerated  Range of motion: as tolerated  Antibiotics: 5 days of PO prophylactic antibiotics  DVT Ppx: Eliquis  PT/OT: Start POD#3  Follow-up: 10-14 days with AP/Lateral of operative knee (non-weightbearing)       Time In: 11:00 AM  Time Out: 12:15 PM  Total Billable Time: 75 minutes    SUBJECTIVE     Pt reports: she is doing well overall. She has been working on her knee extension range of motion at home.  She was compliant with home exercise program.  Response to previous treatment: no notable change  Functional change: no notable change    Pain: 2/10  Location: left knee    OBJECTIVE     Objective Measures updated at progress report unless specified.     Treatment     Smooth received the treatments listed below:      Intervention Code  (see below chart)  10/8/24   Upright Bike TE 10 minutes   Biodex Patellar Tendon Isometric Protocol TE 20 minutes   GameReady  NP   Manual Therapy  MT Patella mobs; knee PROM to improve flexion and extension   Heel Prop TE 5 minutes; 10#   NMES: Quad Set NR NP   NMES: LAQ with strap NR 5 minutes   NMES: SAQ NR 5 minutes   Standing TKE NR Blue band ; 3x10   Single Leg Shuttle Squats NR 1 band; 3x10     25 minutes of Therapeutic Exercise (TE) to develop strength, endurance, ROM, and flexibility. (90804)  10 minutes of Manual therapy (MT) to improve pain and ROM. (68058)  40 minutes of Neuromuscular Re-Education (NR)  to improve: Balance, Coordination, Kinesthetic, Sense, Proprioception, and Posture. (34832)  0 minutes of Therapeutic Activities (TA) to improve functional performance. (22021)   Unattended Electrical Stimulation (ES) for muscle performance and/or pain modulation. (76351)  Vasopneumatic Device Therapy () for management of swelling/edema. (36956)  BFR: Blood flow restriction applied during exercise  NP: Not Performed    Patient Education and Home Exercises     Home Exercises Provided and Patient Education Provided     Education provided:   - HEP, PT POC    Written Home Exercises Provided: yes. Exercises were reviewed and Smooth was able to demonstrate them prior to the end of the session.  Smooth demonstrated good  understanding of the education provided. See EMR under Patient Instructions for exercises provided during therapy sessions    ASSESSMENT     Quad activation continues to improve. Knee extension range of motion still limited. She will benefit from continued care.    Smooth Is progressing well towards her goals.   Pt prognosis is Excellent.     Pt will continue to benefit from skilled outpatient physical therapy to address the deficits listed in the problem list box on initial evaluation, provide pt/family education and to maximize pt's level of independence in the home and community environment.     Pt's  spiritual, cultural and educational needs considered and pt agreeable to plan of care and goals.     Anticipated barriers to physical therapy: None    Goals:  Short Term Goals:  6 weeks Status  Date Met   PAIN: Pt will report worst pain of 4/10 in order to progress toward max functional ability and improve quality of life. [x] Progressing  [] Met  [] Not Met     FUNCTION: Patient will demonstrate improved function as indicated by a functional score of greater than or equal to 5 out of 100 on FOTO. [x] Progressing  [] Met  [] Not Met     MOBILITY: Patient will improve AROM to 50% of stated goals, listed in objective measures above, in order to progress towards independence with functional activities.  [x] Progressing  [] Met  [] Not Met     STRENGTH: Patient will improve strength to 40% of stated goals, listed in objective measures above, in order to progress towards independence with functional activities. [x] Progressing  [] Met  [] Not Met     HEP: Patient will demonstrate independence with HEP in order to progress toward functional independence. [x] Progressing  [] Met  [] Not Met        Long Term Goals:  12 weeks Status Date Met   PAIN: Pt will report worst pain of 2/10 in order to progress toward max functional ability and improve quality of life [x] Progressing  [] Met  [] Not Met     FUNCTION: Patient will demonstrate improved function as indicated by a functional score of greater than or equal to 75 out of 100 on FOTO. [x] Progressing  [] Met  [] Not Met     MOBILITY: Patient will improve AROM to stated goals, listed in objective measures above, in order to return to maximal functional potential and improve quality of life. Goal: Full L knee ROM [x] Progressing  [] Met  [] Not Met     STRENGTH: Patient will improve strength to stated goals, listed in objective measures above, in order to improve functional independence and quality of life. Goal: 75% symmetry on Biodex Testing for LLE [x] Progressing  []  Met  [] Not Met     GAIT: Patient will demonstrate normalized gait mechanics with minimal compensation in order to return to PLOF. [x] Progressing  [] Met  [] Not Met        Final Goals:  9 months post-op Status Date Met   PAIN: Pt will report worst pain of 1/10 in order to progress toward max functional ability and improve quality of life [x] Progressing  [] Met  [] Not Met     FUNCTION: Patient will demonstrate improved function as indicated by a functional score of greater than or equal to 90 out of 100 on FOTO. [x] Progressing  [] Met  [] Not Met     STRENGTH: Patient will improve strength to stated goals, listed in objective measures above, in order to improve functional independence and quality of life. Goal: 95% symmetry on Biodex Testing for LLE [x] Progressing  [] Met  [] Not Met     Patient will return to normal ADL's, IADL's, community involvement, recreational activities, and work-related activities including volleyball. [x] Progressing  [] Met  [] Not Met         PLAN   Continue per plan of care.  Progress as tolerated.    Iraj Huang, PT

## 2024-10-10 ENCOUNTER — CLINICAL SUPPORT (OUTPATIENT)
Facility: HOSPITAL | Age: 24
End: 2024-10-10
Payer: COMMERCIAL

## 2024-10-10 DIAGNOSIS — M25.662 DECREASED RANGE OF MOTION OF LEFT KNEE: Primary | ICD-10-CM

## 2024-10-10 DIAGNOSIS — Z98.890 S/P ARTHROSCOPIC RECONSTRUCTION OF ACL OF LEFT KNEE USING QUADRICEPS TENDON AUTOGRAFT: ICD-10-CM

## 2024-10-10 DIAGNOSIS — Z87.39 S/P ARTHROSCOPIC RECONSTRUCTION OF ACL OF LEFT KNEE USING QUADRICEPS TENDON AUTOGRAFT: ICD-10-CM

## 2024-10-10 DIAGNOSIS — M62.559 ATROPHY OF QUADRICEPS FEMORIS MUSCLE: ICD-10-CM

## 2024-10-10 DIAGNOSIS — M62.81 WEAKNESS OF LEFT QUADRICEPS MUSCLE: ICD-10-CM

## 2024-10-10 PROCEDURE — 97110 THERAPEUTIC EXERCISES: CPT | Mod: PN | Performed by: PHYSICAL THERAPIST

## 2024-10-10 PROCEDURE — 97112 NEUROMUSCULAR REEDUCATION: CPT | Mod: PN | Performed by: PHYSICAL THERAPIST

## 2024-10-10 PROCEDURE — 97140 MANUAL THERAPY 1/> REGIONS: CPT | Mod: PN | Performed by: PHYSICAL THERAPIST

## 2024-10-10 PROCEDURE — 97016 VASOPNEUMATIC DEVICE THERAPY: CPT | Mod: PN | Performed by: PHYSICAL THERAPIST

## 2024-10-11 NOTE — PROGRESS NOTES
[facility]   Physical Therapy Treatment Note     Name: Smooth Perez  Clinic Number: [id number]    Therapy Diagnosis:   Encounter Diagnoses   Name Primary?    Decreased range of motion of left knee Yes    Atrophy of quadriceps femoris muscle     Weakness of left quadriceps muscle     S/P arthroscopic reconstruction of ACL of left knee using quadriceps tendon autograft      Physician: Sarkis Meza MD    Visit Date: 10/10/2024    Physician Orders: PT Eval and Treat  Medical Diagnosis from Referral: Peripheral tear of medial meniscus of left knee as current injury, subsequent encounter [S83.222D], Rupture of anterior cruciate ligament of left knee, subsequent encounter [S83.512D]   Evaluation Date: 9/12/2024  Authorization Period Expiration: 12/31/24  Plan of Care Expiration: 12/31//2024  Progress Note Due: 10/12/2024  Visit # / Visits authorized: 10/12 (1/1 eval)  FOTO: 1/3 (last performed on 9/12/2024)    Date of Surgery   9/10/24   Surgery Performed   AMNION TRIAL, RECONSTRUCTION, KNEE, ACL, ARTHROSCOPIC (Left)  LYSIS, ADHESIONS, KNEE, ARTHROSCOPIC (Left)      Post-Op Precautions   ACL recon protocol  Weight bearing: as tolerated  Range of motion: as tolerated  Antibiotics: 5 days of PO prophylactic antibiotics  DVT Ppx: Eliquis  PT/OT: Start POD#3  Follow-up: 10-14 days with AP/Lateral of operative knee (non-weightbearing)       Time In: 1:00 PM  Time Out: 2:30 PM  Total Billable Time: 90 minutes    SUBJECTIVE     Pt reports: she has been more diligent at home. She feels like her knee is doing better overall.  She was compliant with home exercise program.  Response to previous treatment: no notable change  Functional change: no notable change    Pain: 2/10  Location: left knee    OBJECTIVE     Objective Measures updated at progress report unless specified.     Treatment     Smooth received the treatments listed below:      Intervention Code  (see below chart) 10/10/24    Upright Bike TE 10 minutes   Biodex Patellar Tendon Isometric Protocol TE 20 minutes   Manual Therapy  MT Patella mobs; knee PROM to improve flexion and extension   Heel Prop TE 5 minutes; 10#   NMES: Quad Set NR NP   NMES: LAQ with strap NR 5 minutes   NMES: SAQ NR 5 minutes   Standing TKE NR Blue band ; 3x10   Single Leg Shuttle Squats NR 1 band; 3x10   GameReady  5 minutes     25 minutes of Therapeutic Exercise (TE) to develop strength, endurance, ROM, and flexibility. (09899)  10 minutes of Manual therapy (MT) to improve pain and ROM. (37177)  40 minutes of Neuromuscular Re-Education (NR)  to improve: Balance, Coordination, Kinesthetic, Sense, Proprioception, and Posture. (66290)  0 minutes of Therapeutic Activities (TA) to improve functional performance. (66909)   Unattended Electrical Stimulation (ES) for muscle performance and/or pain modulation. (05320)  Vasopneumatic Device Therapy () for management of swelling/edema. (36685)  BFR: Blood flow restriction applied during exercise  NP: Not Performed     Patient Education and Home Exercises     Home Exercises Provided and Patient Education Provided     Education provided:   - HEP, PT POC    Written Home Exercises Provided: yes. Exercises were reviewed and Smooth was able to demonstrate them prior to the end of the session.  Smooth demonstrated good  understanding of the education provided. See EMR under Patient Instructions for exercises provided during therapy sessions    ASSESSMENT     Knee extension and quad activation improved again today. Good improvement this week compared to previous in overall impression. Continue to focus on improving quad activation and knee extension. Flexion progressing appropriately at this stage.    Smooth Is progressing well towards her goals.   Pt prognosis is Excellent.     Pt will continue to benefit from skilled outpatient physical therapy to address the deficits listed in the problem list box on initial evaluation,  provide pt/family education and to maximize pt's level of independence in the home and community environment.     Pt's spiritual, cultural and educational needs considered and pt agreeable to plan of care and goals.     Anticipated barriers to physical therapy: None    Goals:  Short Term Goals:  6 weeks Status  Date Met   PAIN: Pt will report worst pain of 4/10 in order to progress toward max functional ability and improve quality of life. [x] Progressing  [] Met  [] Not Met     FUNCTION: Patient will demonstrate improved function as indicated by a functional score of greater than or equal to 5 out of 100 on FOTO. [x] Progressing  [] Met  [] Not Met     MOBILITY: Patient will improve AROM to 50% of stated goals, listed in objective measures above, in order to progress towards independence with functional activities.  [x] Progressing  [] Met  [] Not Met     STRENGTH: Patient will improve strength to 40% of stated goals, listed in objective measures above, in order to progress towards independence with functional activities. [x] Progressing  [] Met  [] Not Met     HEP: Patient will demonstrate independence with HEP in order to progress toward functional independence. [x] Progressing  [] Met  [] Not Met        Long Term Goals:  12 weeks Status Date Met   PAIN: Pt will report worst pain of 2/10 in order to progress toward max functional ability and improve quality of life [x] Progressing  [] Met  [] Not Met     FUNCTION: Patient will demonstrate improved function as indicated by a functional score of greater than or equal to 75 out of 100 on FOTO. [x] Progressing  [] Met  [] Not Met     MOBILITY: Patient will improve AROM to stated goals, listed in objective measures above, in order to return to maximal functional potential and improve quality of life. Goal: Full L knee ROM [x] Progressing  [] Met  [] Not Met     STRENGTH: Patient will improve strength to stated goals, listed in objective measures above, in order to  improve functional independence and quality of life. Goal: 75% symmetry on Biodex Testing for LLE [x] Progressing  [] Met  [] Not Met     GAIT: Patient will demonstrate normalized gait mechanics with minimal compensation in order to return to PLOF. [x] Progressing  [] Met  [] Not Met        Final Goals:  9 months post-op Status Date Met   PAIN: Pt will report worst pain of 1/10 in order to progress toward max functional ability and improve quality of life [x] Progressing  [] Met  [] Not Met     FUNCTION: Patient will demonstrate improved function as indicated by a functional score of greater than or equal to 90 out of 100 on FOTO. [x] Progressing  [] Met  [] Not Met     STRENGTH: Patient will improve strength to stated goals, listed in objective measures above, in order to improve functional independence and quality of life. Goal: 95% symmetry on Biodex Testing for LLE [x] Progressing  [] Met  [] Not Met     Patient will return to normal ADL's, IADL's, community involvement, recreational activities, and work-related activities including volleyball. [x] Progressing  [] Met  [] Not Met         PLAN   Continue per plan of care.  Progress as tolerated.    Iraj Huang, PT

## 2024-10-15 ENCOUNTER — CLINICAL SUPPORT (OUTPATIENT)
Facility: HOSPITAL | Age: 24
End: 2024-10-15
Payer: COMMERCIAL

## 2024-10-15 DIAGNOSIS — M62.81 WEAKNESS OF LEFT QUADRICEPS MUSCLE: ICD-10-CM

## 2024-10-15 DIAGNOSIS — Z98.890 S/P ARTHROSCOPIC RECONSTRUCTION OF ACL OF LEFT KNEE USING QUADRICEPS TENDON AUTOGRAFT: ICD-10-CM

## 2024-10-15 DIAGNOSIS — M62.559 ATROPHY OF QUADRICEPS FEMORIS MUSCLE: ICD-10-CM

## 2024-10-15 DIAGNOSIS — Z87.39 S/P ARTHROSCOPIC RECONSTRUCTION OF ACL OF LEFT KNEE USING QUADRICEPS TENDON AUTOGRAFT: ICD-10-CM

## 2024-10-15 DIAGNOSIS — M25.662 DECREASED RANGE OF MOTION OF LEFT KNEE: Primary | ICD-10-CM

## 2024-10-15 PROCEDURE — 97112 NEUROMUSCULAR REEDUCATION: CPT | Mod: PN | Performed by: PHYSICAL THERAPIST

## 2024-10-15 PROCEDURE — 97110 THERAPEUTIC EXERCISES: CPT | Mod: PN | Performed by: PHYSICAL THERAPIST

## 2024-10-15 PROCEDURE — 97140 MANUAL THERAPY 1/> REGIONS: CPT | Mod: PN | Performed by: PHYSICAL THERAPIST

## 2024-10-15 NOTE — PROGRESS NOTES
[facility]   Physical Therapy Treatment Note     Name: Smooth Perez  Clinic Number: [id number]    Therapy Diagnosis:   Encounter Diagnoses   Name Primary?    Decreased range of motion of left knee Yes    Atrophy of quadriceps femoris muscle     Weakness of left quadriceps muscle     S/P arthroscopic reconstruction of ACL of left knee using quadriceps tendon autograft      Physician: Sarkis Meza MD    Visit Date: 10/15/2024    Physician Orders: PT Eval and Treat  Medical Diagnosis from Referral: Peripheral tear of medial meniscus of left knee as current injury, subsequent encounter [S83.222D], Rupture of anterior cruciate ligament of left knee, subsequent encounter [S83.512D]   Evaluation Date: 9/12/2024  Authorization Period Expiration: 12/31/24  Plan of Care Expiration: 12/31//2024  Progress Note Due: 10/12/2024  Visit # / Visits authorized: 11/12 (1/1 eval)  FOTO: 1/3 (last performed on 9/12/2024)    Date of Surgery   9/10/24   Surgery Performed   AMNION TRIAL, RECONSTRUCTION, KNEE, ACL, ARTHROSCOPIC (Left)  LYSIS, ADHESIONS, KNEE, ARTHROSCOPIC (Left)      Post-Op Precautions   ACL recon protocol  Weight bearing: as tolerated  Range of motion: as tolerated  Antibiotics: 5 days of PO prophylactic antibiotics  DVT Ppx: Eliquis  PT/OT: Start POD#3  Follow-up: 10-14 days with AP/Lateral of operative knee (non-weightbearing)       Time In: 11:00 AM  Time Out: 12:25 PM  Total Billable Time: 85 minutes    SUBJECTIVE     Pt reports: she is doing well today. She has no new complaints.  She was compliant with home exercise program.  Response to previous treatment: no notable change  Functional change: no notable change    Pain: 2/10  Location: left knee    OBJECTIVE     Objective Measures updated at progress report unless specified.     Treatment     Smooth received the treatments listed below:      Intervention Code  (see below chart) 10/10/24   Upright Bike TE 10 minutes    Biodex Patellar Tendon Isometric Protocol TE 20 minutes   Manual Therapy  MT Patella mobs; knee PROM to improve flexion and extension   Heel Prop TE 5 minutes; 10#   NMES: Quad Set NR NP   NMES: LAQ with strap NR 5 minutes   NMES: SAQ NR 5 minutes   Standing TKE NR Blue band ; 3x10   Single Leg Shuttle Squats NR 1 band; 3x10   GameReady  5 minutes     25 minutes of Therapeutic Exercise (TE) to develop strength, endurance, ROM, and flexibility. (17990)  10 minutes of Manual therapy (MT) to improve pain and ROM. (19704)  40 minutes of Neuromuscular Re-Education (NR)  to improve: Balance, Coordination, Kinesthetic, Sense, Proprioception, and Posture. (68373)  0 minutes of Therapeutic Activities (TA) to improve functional performance. (16101)   Unattended Electrical Stimulation (ES) for muscle performance and/or pain modulation. (34782)  Vasopneumatic Device Therapy () for management of swelling/edema. (92732)  BFR: Blood flow restriction applied during exercise  NP: Not Performed     Patient Education and Home Exercises     Home Exercises Provided and Patient Education Provided     Education provided:   - HEP, PT POC    Written Home Exercises Provided: yes. Exercises were reviewed and Smooth was able to demonstrate them prior to the end of the session.  Smooth demonstrated good  understanding of the education provided. See EMR under Patient Instructions for exercises provided during therapy sessions    ASSESSMENT     Improved knee extension range of motion today. Good progression compared to prior visit. She will benefit from continued physical therapy care.    Smooth Is progressing well towards her goals.   Pt prognosis is Excellent.     Pt will continue to benefit from skilled outpatient physical therapy to address the deficits listed in the problem list box on initial evaluation, provide pt/family education and to maximize pt's level of independence in the home and community environment.     Pt's spiritual,  cultural and educational needs considered and pt agreeable to plan of care and goals.     Anticipated barriers to physical therapy: None    Goals:  Short Term Goals:  6 weeks Status  Date Met   PAIN: Pt will report worst pain of 4/10 in order to progress toward max functional ability and improve quality of life. [x] Progressing  [] Met  [] Not Met     FUNCTION: Patient will demonstrate improved function as indicated by a functional score of greater than or equal to 5 out of 100 on FOTO. [x] Progressing  [] Met  [] Not Met     MOBILITY: Patient will improve AROM to 50% of stated goals, listed in objective measures above, in order to progress towards independence with functional activities.  [x] Progressing  [] Met  [] Not Met     STRENGTH: Patient will improve strength to 40% of stated goals, listed in objective measures above, in order to progress towards independence with functional activities. [x] Progressing  [] Met  [] Not Met     HEP: Patient will demonstrate independence with HEP in order to progress toward functional independence. [x] Progressing  [] Met  [] Not Met        Long Term Goals:  12 weeks Status Date Met   PAIN: Pt will report worst pain of 2/10 in order to progress toward max functional ability and improve quality of life [x] Progressing  [] Met  [] Not Met     FUNCTION: Patient will demonstrate improved function as indicated by a functional score of greater than or equal to 75 out of 100 on FOTO. [x] Progressing  [] Met  [] Not Met     MOBILITY: Patient will improve AROM to stated goals, listed in objective measures above, in order to return to maximal functional potential and improve quality of life. Goal: Full L knee ROM [x] Progressing  [] Met  [] Not Met     STRENGTH: Patient will improve strength to stated goals, listed in objective measures above, in order to improve functional independence and quality of life. Goal: 75% symmetry on Biodex Testing for LLE [x] Progressing  [] Met  [] Not Met      GAIT: Patient will demonstrate normalized gait mechanics with minimal compensation in order to return to PLOF. [x] Progressing  [] Met  [] Not Met        Final Goals:  9 months post-op Status Date Met   PAIN: Pt will report worst pain of 1/10 in order to progress toward max functional ability and improve quality of life [x] Progressing  [] Met  [] Not Met     FUNCTION: Patient will demonstrate improved function as indicated by a functional score of greater than or equal to 90 out of 100 on FOTO. [x] Progressing  [] Met  [] Not Met     STRENGTH: Patient will improve strength to stated goals, listed in objective measures above, in order to improve functional independence and quality of life. Goal: 95% symmetry on Biodex Testing for LLE [x] Progressing  [] Met  [] Not Met     Patient will return to normal ADL's, IADL's, community involvement, recreational activities, and work-related activities including volleyball. [x] Progressing  [] Met  [] Not Met         PLAN   Continue per plan of care.  Progress as tolerated.    Iraj Huang, PT

## 2024-10-17 ENCOUNTER — PATIENT MESSAGE (OUTPATIENT)
Dept: SPORTS MEDICINE | Facility: CLINIC | Age: 24
End: 2024-10-17
Payer: COMMERCIAL

## 2024-10-17 ENCOUNTER — CLINICAL SUPPORT (OUTPATIENT)
Facility: HOSPITAL | Age: 24
End: 2024-10-17
Payer: COMMERCIAL

## 2024-10-17 DIAGNOSIS — M62.81 WEAKNESS OF LEFT QUADRICEPS MUSCLE: ICD-10-CM

## 2024-10-17 DIAGNOSIS — M62.559 ATROPHY OF QUADRICEPS FEMORIS MUSCLE: ICD-10-CM

## 2024-10-17 DIAGNOSIS — Z87.39 S/P ARTHROSCOPIC RECONSTRUCTION OF ACL OF LEFT KNEE USING QUADRICEPS TENDON AUTOGRAFT: ICD-10-CM

## 2024-10-17 DIAGNOSIS — M25.662 DECREASED RANGE OF MOTION OF LEFT KNEE: Primary | ICD-10-CM

## 2024-10-17 DIAGNOSIS — Z98.890 S/P ARTHROSCOPIC RECONSTRUCTION OF ACL OF LEFT KNEE USING QUADRICEPS TENDON AUTOGRAFT: ICD-10-CM

## 2024-10-17 PROCEDURE — 97140 MANUAL THERAPY 1/> REGIONS: CPT | Mod: PN | Performed by: PHYSICAL THERAPIST

## 2024-10-17 PROCEDURE — 97112 NEUROMUSCULAR REEDUCATION: CPT | Mod: PN | Performed by: PHYSICAL THERAPIST

## 2024-10-17 PROCEDURE — 97110 THERAPEUTIC EXERCISES: CPT | Mod: PN | Performed by: PHYSICAL THERAPIST

## 2024-10-18 NOTE — PROGRESS NOTES
[facility]   Physical Therapy Treatment Note     Name: Smooth Perez  Clinic Number: [id number]    Therapy Diagnosis:   Encounter Diagnoses   Name Primary?    Decreased range of motion of left knee Yes    Atrophy of quadriceps femoris muscle     Weakness of left quadriceps muscle     S/P arthroscopic reconstruction of ACL of left knee using quadriceps tendon autograft      Physician: Sarkis Meza MD    Visit Date: 10/17/2024    Physician Orders: PT Eval and Treat  Medical Diagnosis from Referral: Peripheral tear of medial meniscus of left knee as current injury, subsequent encounter [S83.222D], Rupture of anterior cruciate ligament of left knee, subsequent encounter [S83.512D]   Evaluation Date: 9/12/2024  Authorization Period Expiration: 12/31/24  Plan of Care Expiration: 12/31//2024  Progress Note Due: 11/17/24  Visit # / Visits authorized: 12/12 (1/1 eval)  FOTO: 1/3 (last performed on 9/12/2024)    Date of Surgery   9/10/24   Surgery Performed   AMNION TRIAL, RECONSTRUCTION, KNEE, ACL, ARTHROSCOPIC (Left)  LYSIS, ADHESIONS, KNEE, ARTHROSCOPIC (Left)      Post-Op Precautions   ACL recon protocol  Weight bearing: as tolerated  Range of motion: as tolerated  Antibiotics: 5 days of PO prophylactic antibiotics  DVT Ppx: Eliquis  PT/OT: Start POD#3  Follow-up: 10-14 days with AP/Lateral of operative knee (non-weightbearing)       Time In: 1:00 AM  Time Out: 2:40 PM  Total Billable Time: 100 minutes    SUBJECTIVE     Pt reports: she is doing well today. She has no new complaints.  She was compliant with home exercise program.  Response to previous treatment: no notable change  Functional change: no notable change    Pain: 2/10  Location: left knee    OBJECTIVE     10/17/24:  Able to reach full knee extension in CKC. Still unable to perform SLR without lag.     Treatment     Smooth received the treatments listed below:      Intervention Code  (see below chart) 10/17/24   Upright  Bike TE 10 minutes   Biodex Patellar Tendon Isometric Protocol TE 20 minutes   Matrix Knee Extension TE 90-60 ; 35# eccentrics ; 5x6   Manual Therapy  MT Patella mobs; knee PROM to improve flexion and extension   Backwards Treadmill Walk NR Dynamic Breaking L12 ; 5 minutes   Tank Pull NR L2; 2 x 30 yard laps   Gait Training NR Heel to toe gait; vc and tc for reaching terminal extension with each step   Single Leg Shuttle Squats NR 3 bands; 3x10; focus on reaching terminal extension at top     30 minutes of Therapeutic Exercise (TE) to develop strength, endurance, ROM, and flexibility. (89619)  10 minutes of Manual therapy (MT) to improve pain and ROM. (53444)  40 minutes of Neuromuscular Re-Education (NR)  to improve: Balance, Coordination, Kinesthetic, Sense, Proprioception, and Posture. (94144)  0 minutes of Therapeutic Activities (TA) to improve functional performance. (76804)   Unattended Electrical Stimulation (ES) for muscle performance and/or pain modulation. (41726)  Vasopneumatic Device Therapy () for management of swelling/edema. (90955)  BFR: Blood flow restriction applied during exercise  NP: Not Performed     Patient Education and Home Exercises     Home Exercises Provided and Patient Education Provided     Education provided:   - HEP, PT POC    Written Home Exercises Provided: yes. Exercises were reviewed and Smooth was able to demonstrate them prior to the end of the session.  Smooth demonstrated good  understanding of the education provided. See EMR under Patient Instructions for exercises provided during therapy sessions    ASSESSMENT     Patient tolerated today's treatment well. Improvement in CKC terminal knee extension. Still poor strength and control at terminal knee extension but overall improved compared to eval.    Smooth Is progressing well towards her goals.   Pt prognosis is Excellent.     Pt will continue to benefit from skilled outpatient physical therapy to address the deficits  listed in the problem list box on initial evaluation, provide pt/family education and to maximize pt's level of independence in the home and community environment.     Pt's spiritual, cultural and educational needs considered and pt agreeable to plan of care and goals.     Anticipated barriers to physical therapy: None    Goals:  Short Term Goals:  6 weeks Status  Date Met   PAIN: Pt will report worst pain of 4/10 in order to progress toward max functional ability and improve quality of life. [x] Progressing  [] Met  [] Not Met     FUNCTION: Patient will demonstrate improved function as indicated by a functional score of greater than or equal to 5 out of 100 on FOTO. [x] Progressing  [] Met  [] Not Met     MOBILITY: Patient will improve AROM to 50% of stated goals, listed in objective measures above, in order to progress towards independence with functional activities.  [x] Progressing  [] Met  [] Not Met     STRENGTH: Patient will improve strength to 40% of stated goals, listed in objective measures above, in order to progress towards independence with functional activities. [x] Progressing  [] Met  [] Not Met     HEP: Patient will demonstrate independence with HEP in order to progress toward functional independence. [x] Progressing  [] Met  [] Not Met        Long Term Goals:  12 weeks Status Date Met   PAIN: Pt will report worst pain of 2/10 in order to progress toward max functional ability and improve quality of life [x] Progressing  [] Met  [] Not Met     FUNCTION: Patient will demonstrate improved function as indicated by a functional score of greater than or equal to 75 out of 100 on FOTO. [x] Progressing  [] Met  [] Not Met     MOBILITY: Patient will improve AROM to stated goals, listed in objective measures above, in order to return to maximal functional potential and improve quality of life. Goal: Full L knee ROM [x] Progressing  [] Met  [] Not Met     STRENGTH: Patient will improve strength to stated  goals, listed in objective measures above, in order to improve functional independence and quality of life. Goal: 75% symmetry on Biodex Testing for LLE [x] Progressing  [] Met  [] Not Met     GAIT: Patient will demonstrate normalized gait mechanics with minimal compensation in order to return to PLOF. [x] Progressing  [] Met  [] Not Met        Final Goals:  9 months post-op Status Date Met   PAIN: Pt will report worst pain of 1/10 in order to progress toward max functional ability and improve quality of life [x] Progressing  [] Met  [] Not Met     FUNCTION: Patient will demonstrate improved function as indicated by a functional score of greater than or equal to 90 out of 100 on FOTO. [x] Progressing  [] Met  [] Not Met     STRENGTH: Patient will improve strength to stated goals, listed in objective measures above, in order to improve functional independence and quality of life. Goal: 95% symmetry on Biodex Testing for LLE [x] Progressing  [] Met  [] Not Met     Patient will return to normal ADL's, IADL's, community involvement, recreational activities, and work-related activities including volleyball. [x] Progressing  [] Met  [] Not Met         PLAN   Continue per plan of care.  Progress as tolerated.    Iraj Huang, PT

## 2024-10-22 ENCOUNTER — CLINICAL SUPPORT (OUTPATIENT)
Facility: HOSPITAL | Age: 24
End: 2024-10-22
Payer: COMMERCIAL

## 2024-10-22 ENCOUNTER — TELEPHONE (OUTPATIENT)
Dept: SPORTS MEDICINE | Facility: CLINIC | Age: 24
End: 2024-10-22
Payer: COMMERCIAL

## 2024-10-22 DIAGNOSIS — M62.559 ATROPHY OF QUADRICEPS FEMORIS MUSCLE: ICD-10-CM

## 2024-10-22 DIAGNOSIS — M25.662 DECREASED RANGE OF MOTION OF LEFT KNEE: Primary | ICD-10-CM

## 2024-10-22 DIAGNOSIS — Z98.890 S/P ARTHROSCOPIC RECONSTRUCTION OF ACL OF LEFT KNEE USING QUADRICEPS TENDON AUTOGRAFT: ICD-10-CM

## 2024-10-22 DIAGNOSIS — Z87.39 S/P ARTHROSCOPIC RECONSTRUCTION OF ACL OF LEFT KNEE USING QUADRICEPS TENDON AUTOGRAFT: ICD-10-CM

## 2024-10-22 DIAGNOSIS — M62.81 WEAKNESS OF LEFT QUADRICEPS MUSCLE: ICD-10-CM

## 2024-10-22 PROCEDURE — 97110 THERAPEUTIC EXERCISES: CPT | Mod: PN | Performed by: PHYSICAL THERAPIST

## 2024-10-22 PROCEDURE — 97112 NEUROMUSCULAR REEDUCATION: CPT | Mod: PN | Performed by: PHYSICAL THERAPIST

## 2024-10-22 PROCEDURE — 97140 MANUAL THERAPY 1/> REGIONS: CPT | Mod: PN | Performed by: PHYSICAL THERAPIST

## 2024-10-22 NOTE — PROGRESS NOTES
[facility]   Physical Therapy Treatment Note     Name: Smooth Perez  Clinic Number: [id number]    Therapy Diagnosis:   Encounter Diagnoses   Name Primary?    Decreased range of motion of left knee Yes    Atrophy of quadriceps femoris muscle     Weakness of left quadriceps muscle     S/P arthroscopic reconstruction of ACL of left knee using quadriceps tendon autograft      Physician: Sarkis Meza MD    Visit Date: 10/22/2024    Physician Orders: PT Eval and Treat  Medical Diagnosis from Referral: Peripheral tear of medial meniscus of left knee as current injury, subsequent encounter [S83.222D], Rupture of anterior cruciate ligament of left knee, subsequent encounter [S83.512D]   Evaluation Date: 9/12/2024  Authorization Period Expiration: 12/31/24  Plan of Care Expiration: 12/31//2024  Progress Note Due: 11/17/24  Visit # / Visits authorized: 13/22 (1/1 eval)  FOTO: 1/3 (last performed on 9/12/2024)    Date of Surgery   9/10/24   Surgery Performed   AMNION TRIAL, RECONSTRUCTION, KNEE, ACL, ARTHROSCOPIC (Left)  LYSIS, ADHESIONS, KNEE, ARTHROSCOPIC (Left)      Post-Op Precautions   ACL recon protocol  Weight bearing: as tolerated  Range of motion: as tolerated  Antibiotics: 5 days of PO prophylactic antibiotics  DVT Ppx: Eliquis  PT/OT: Start POD#3  Follow-up: 10-14 days with AP/Lateral of operative knee (non-weightbearing)       Time In: 11:00 AM  Time Out: 12:15 PM  Total Billable Time: 75 minutes    SUBJECTIVE     Pt reports: she has been working more at home.  She was compliant with home exercise program.  Response to previous treatment: no notable change  Functional change: no notable change    Pain: 2/10  Location: left knee    OBJECTIVE     10/17/24:  Able to reach full knee extension in CKC. Still unable to perform SLR without lag.     Treatment     Smooth received the treatments listed below:      Intervention Code  (see below chart) 10/22/24   Upright Bike TE 10  minutes   Biodex Patellar Tendon Isometric Protocol TE 20 minutes   Matrix Knee Extension TE 90-60 ; 35# eccentrics (3) ; 5x6   Manual Therapy  MT Patella mobs; knee PROM to improve flexion and extension   Backwards Treadmill Walk NR Dynamic Breaking L12 ; 5 minutes   1080 Sprint  NR 2 x 30 yard laps, 8 kg   Gait Training NR Heel to toe gait; vc and tc for reaching terminal extension with each step   Single Leg Shuttle Squats NR 3 bands; 3x10; focus on reaching terminal extension at top     25 minutes of Therapeutic Exercise (TE) to develop strength, endurance, ROM, and flexibility. (55419)  10 minutes of Manual therapy (MT) to improve pain and ROM. (22670)  40 minutes of Neuromuscular Re-Education (NR)  to improve: Balance, Coordination, Kinesthetic, Sense, Proprioception, and Posture. (04131)  0 minutes of Therapeutic Activities (TA) to improve functional performance. (63732)   Unattended Electrical Stimulation (ES) for muscle performance and/or pain modulation. (55414)   Vasopneumatic Device Therapy () for management of swelling/edema. (00250)  BFR: Blood flow restriction applied during exercise  NP: Not Performed     Patient Education and Home Exercises     Home Exercises Provided and Patient Education Provided     Education provided:   - HEP, PT POC    Written Home Exercises Provided: yes. Exercises were reviewed and Smooth was able to demonstrate them prior to the end of the session.  Smooth demonstrated good  understanding of the education provided. See EMR under Patient Instructions for exercises provided during therapy sessions    ASSESSMENT     Improved ability to reach terminal extension. She will need continued care.    Smooth Is progressing well towards her goals.   Pt prognosis is Excellent.     Pt will continue to benefit from skilled outpatient physical therapy to address the deficits listed in the problem list box on initial evaluation, provide pt/family education and to maximize pt's level of  independence in the home and community environment.     Pt's spiritual, cultural and educational needs considered and pt agreeable to plan of care and goals.     Anticipated barriers to physical therapy: None    Goals:  Short Term Goals:  6 weeks Status  Date Met   PAIN: Pt will report worst pain of 4/10 in order to progress toward max functional ability and improve quality of life. [x] Progressing  [] Met  [] Not Met     FUNCTION: Patient will demonstrate improved function as indicated by a functional score of greater than or equal to 5 out of 100 on FOTO. [x] Progressing  [] Met  [] Not Met     MOBILITY: Patient will improve AROM to 50% of stated goals, listed in objective measures above, in order to progress towards independence with functional activities.  [x] Progressing  [] Met  [] Not Met     STRENGTH: Patient will improve strength to 40% of stated goals, listed in objective measures above, in order to progress towards independence with functional activities. [x] Progressing  [] Met  [] Not Met     HEP: Patient will demonstrate independence with HEP in order to progress toward functional independence. [x] Progressing  [] Met  [] Not Met        Long Term Goals:  12 weeks Status Date Met   PAIN: Pt will report worst pain of 2/10 in order to progress toward max functional ability and improve quality of life [x] Progressing  [] Met  [] Not Met     FUNCTION: Patient will demonstrate improved function as indicated by a functional score of greater than or equal to 75 out of 100 on FOTO. [x] Progressing  [] Met  [] Not Met     MOBILITY: Patient will improve AROM to stated goals, listed in objective measures above, in order to return to maximal functional potential and improve quality of life. Goal: Full L knee ROM [x] Progressing  [] Met  [] Not Met     STRENGTH: Patient will improve strength to stated goals, listed in objective measures above, in order to improve functional independence and quality of life. Goal:  75% symmetry on Biodex Testing for LLE [x] Progressing  [] Met  [] Not Met     GAIT: Patient will demonstrate normalized gait mechanics with minimal compensation in order to return to PLOF. [x] Progressing  [] Met  [] Not Met        Final Goals:  9 months post-op Status Date Met   PAIN: Pt will report worst pain of 1/10 in order to progress toward max functional ability and improve quality of life [x] Progressing  [] Met  [] Not Met     FUNCTION: Patient will demonstrate improved function as indicated by a functional score of greater than or equal to 90 out of 100 on FOTO. [x] Progressing  [] Met  [] Not Met     STRENGTH: Patient will improve strength to stated goals, listed in objective measures above, in order to improve functional independence and quality of life. Goal: 95% symmetry on Biodex Testing for LLE [x] Progressing  [] Met  [] Not Met     Patient will return to normal ADL's, IADL's, community involvement, recreational activities, and work-related activities including volleyball. [x] Progressing  [] Met  [] Not Met         PLAN   Continue per plan of care.  Progress as tolerated.    Iraj Huang, PT

## 2024-10-23 ENCOUNTER — CLINICAL SUPPORT (OUTPATIENT)
Facility: HOSPITAL | Age: 24
End: 2024-10-23
Payer: COMMERCIAL

## 2024-10-23 DIAGNOSIS — M62.81 WEAKNESS OF LEFT QUADRICEPS MUSCLE: ICD-10-CM

## 2024-10-23 DIAGNOSIS — Z98.890 S/P ARTHROSCOPIC RECONSTRUCTION OF ACL OF LEFT KNEE USING QUADRICEPS TENDON AUTOGRAFT: ICD-10-CM

## 2024-10-23 DIAGNOSIS — M25.662 DECREASED RANGE OF MOTION OF LEFT KNEE: Primary | ICD-10-CM

## 2024-10-23 DIAGNOSIS — Z87.39 S/P ARTHROSCOPIC RECONSTRUCTION OF ACL OF LEFT KNEE USING QUADRICEPS TENDON AUTOGRAFT: ICD-10-CM

## 2024-10-23 DIAGNOSIS — M62.559 ATROPHY OF QUADRICEPS FEMORIS MUSCLE: ICD-10-CM

## 2024-10-23 PROCEDURE — 97110 THERAPEUTIC EXERCISES: CPT | Mod: PN | Performed by: PHYSICAL THERAPIST

## 2024-10-23 PROCEDURE — 97112 NEUROMUSCULAR REEDUCATION: CPT | Mod: PN | Performed by: PHYSICAL THERAPIST

## 2024-10-23 PROCEDURE — 97140 MANUAL THERAPY 1/> REGIONS: CPT | Mod: PN | Performed by: PHYSICAL THERAPIST

## 2024-10-23 NOTE — PROGRESS NOTES
[facility]   Physical Therapy Treatment Note     Name: Smooth Perez  Clinic Number: [id number]    Therapy Diagnosis:   Encounter Diagnoses   Name Primary?    Decreased range of motion of left knee Yes    Atrophy of quadriceps femoris muscle     Weakness of left quadriceps muscle     S/P arthroscopic reconstruction of ACL of left knee using quadriceps tendon autograft      Physician: Sarkis Meza MD    Visit Date: 10/23/2024    Physician Orders: PT Eval and Treat  Medical Diagnosis from Referral: Peripheral tear of medial meniscus of left knee as current injury, subsequent encounter [S83.222D], Rupture of anterior cruciate ligament of left knee, subsequent encounter [S83.512D]   Evaluation Date: 9/12/2024  Authorization Period Expiration: 12/31/24  Plan of Care Expiration: 12/31//2024  Progress Note Due: 11/17/24  Visit # / Visits authorized: 14/22 (1/1 eval)  FOTO: 1/3 (last performed on 9/12/2024)    Date of Surgery   9/10/24   Surgery Performed   AMNION TRIAL, RECONSTRUCTION, KNEE, ACL, ARTHROSCOPIC (Left)  LYSIS, ADHESIONS, KNEE, ARTHROSCOPIC (Left)      Post-Op Precautions   ACL recon protocol  Weight bearing: as tolerated  Range of motion: as tolerated  Antibiotics: 5 days of PO prophylactic antibiotics  DVT Ppx: Eliquis  PT/OT: Start POD#3  Follow-up: 10-14 days with AP/Lateral of operative knee (non-weightbearing)       Time In: 1:00 PM  Time Out: 2:15 PM  Total Billable Time: 75 minutes    SUBJECTIVE     Pt reports: she still has difficulty controlling her knee in terminal extension but otherwise feels like she is progressing.  She was compliant with home exercise program.  Response to previous treatment: no notable change  Functional change: no notable change    Pain: 2/10  Location: left knee    OBJECTIVE     10/17/24:  Able to reach full knee extension in CK. Still unable to perform SLR without lag.     Treatment     Smooth received the treatments listed below:       Intervention Code  (see below chart) 10/22/24   Upright Bike TE 10 minutes   Biodex Patellar Tendon Isometric Protocol TE 20 minutes   Matrix Knee Extension TE 90-60 ; 35# eccentrics (3) ; 5x6   Manual Therapy  MT Patella mobs; knee PROM to improve flexion and extension   Backwards Treadmill Walk NR Dynamic Breaking L12 ; 5 minutes   1080 Sprint  NR 2 x 30 yard laps, 8 kg   Gait Training NR Heel to toe gait; vc and tc for reaching terminal extension with each step   Single Leg Shuttle Squats NR 3 bands; 3x10; focus on reaching terminal extension at top     25 minutes of Therapeutic Exercise (TE) to develop strength, endurance, ROM, and flexibility. (74398)  10 minutes of Manual therapy (MT) to improve pain and ROM. (18739)  40 minutes of Neuromuscular Re-Education (NR)  to improve: Balance, Coordination, Kinesthetic, Sense, Proprioception, and Posture. (22288)  0 minutes of Therapeutic Activities (TA) to improve functional performance. (57452)   Unattended Electrical Stimulation (ES) for muscle performance and/or pain modulation. (79396)   Vasopneumatic Device Therapy () for management of swelling/edema. (21566)  BFR: Blood flow restriction applied during exercise  NP: Not Performed     Patient Education and Home Exercises     Home Exercises Provided and Patient Education Provided     Education provided:   - HEP, PT POC    Written Home Exercises Provided: yes. Exercises were reviewed and Smooth was able to demonstrate them prior to the end of the session.  Smooth demonstrated good  understanding of the education provided. See EMR under Patient Instructions for exercises provided during therapy sessions    ASSESSMENT     Quad control is improving. She will benefit from continued care.    Smooth Is progressing well towards her goals.   Pt prognosis is Excellent.     Pt will continue to benefit from skilled outpatient physical therapy to address the deficits listed in the problem list box on initial  evaluation, provide pt/family education and to maximize pt's level of independence in the home and community environment.     Pt's spiritual, cultural and educational needs considered and pt agreeable to plan of care and goals.     Anticipated barriers to physical therapy: None    Goals:  Short Term Goals:  6 weeks Status  Date Met   PAIN: Pt will report worst pain of 4/10 in order to progress toward max functional ability and improve quality of life. [x] Progressing  [] Met  [] Not Met     FUNCTION: Patient will demonstrate improved function as indicated by a functional score of greater than or equal to 5 out of 100 on FOTO. [x] Progressing  [] Met  [] Not Met     MOBILITY: Patient will improve AROM to 50% of stated goals, listed in objective measures above, in order to progress towards independence with functional activities.  [x] Progressing  [] Met  [] Not Met     STRENGTH: Patient will improve strength to 40% of stated goals, listed in objective measures above, in order to progress towards independence with functional activities. [x] Progressing  [] Met  [] Not Met     HEP: Patient will demonstrate independence with HEP in order to progress toward functional independence. [x] Progressing  [] Met  [] Not Met        Long Term Goals:  12 weeks Status Date Met   PAIN: Pt will report worst pain of 2/10 in order to progress toward max functional ability and improve quality of life [x] Progressing  [] Met  [] Not Met     FUNCTION: Patient will demonstrate improved function as indicated by a functional score of greater than or equal to 75 out of 100 on FOTO. [x] Progressing  [] Met  [] Not Met     MOBILITY: Patient will improve AROM to stated goals, listed in objective measures above, in order to return to maximal functional potential and improve quality of life. Goal: Full L knee ROM [x] Progressing  [] Met  [] Not Met     STRENGTH: Patient will improve strength to stated goals, listed in objective measures above, in  order to improve functional independence and quality of life. Goal: 75% symmetry on Biodex Testing for LLE [x] Progressing  [] Met  [] Not Met     GAIT: Patient will demonstrate normalized gait mechanics with minimal compensation in order to return to PLOF. [x] Progressing  [] Met  [] Not Met        Final Goals:  9 months post-op Status Date Met   PAIN: Pt will report worst pain of 1/10 in order to progress toward max functional ability and improve quality of life [x] Progressing  [] Met  [] Not Met     FUNCTION: Patient will demonstrate improved function as indicated by a functional score of greater than or equal to 90 out of 100 on FOTO. [x] Progressing  [] Met  [] Not Met     STRENGTH: Patient will improve strength to stated goals, listed in objective measures above, in order to improve functional independence and quality of life. Goal: 95% symmetry on Biodex Testing for LLE [x] Progressing  [] Met  [] Not Met     Patient will return to normal ADL's, IADL's, community involvement, recreational activities, and work-related activities including volleyball. [x] Progressing  [] Met  [] Not Met         PLAN   Continue per plan of care.  Progress as tolerated.    Iraj Huang, PT

## 2024-10-24 ENCOUNTER — OFFICE VISIT (OUTPATIENT)
Dept: SPORTS MEDICINE | Facility: CLINIC | Age: 24
End: 2024-10-24
Payer: COMMERCIAL

## 2024-10-24 DIAGNOSIS — M25.662 DECREASED RANGE OF MOTION OF LEFT KNEE: ICD-10-CM

## 2024-10-24 DIAGNOSIS — M23.52 RECURRENT LEFT KNEE INSTABILITY: ICD-10-CM

## 2024-10-24 DIAGNOSIS — Z98.890 S/P ACL RECONSTRUCTION: Primary | ICD-10-CM

## 2024-10-24 DIAGNOSIS — S83.512D RUPTURE OF ANTERIOR CRUCIATE LIGAMENT OF LEFT KNEE, SUBSEQUENT ENCOUNTER: ICD-10-CM

## 2024-10-24 DIAGNOSIS — G89.18 POST-OP PAIN: ICD-10-CM

## 2024-10-24 DIAGNOSIS — M62.81 WEAKNESS OF LEFT QUADRICEPS MUSCLE: ICD-10-CM

## 2024-10-24 DIAGNOSIS — M62.559 ATROPHY OF QUADRICEPS FEMORIS MUSCLE: ICD-10-CM

## 2024-10-24 DIAGNOSIS — Z87.39 S/P ARTHROSCOPIC RECONSTRUCTION OF ACL OF LEFT KNEE USING QUADRICEPS TENDON AUTOGRAFT: ICD-10-CM

## 2024-10-24 DIAGNOSIS — Z98.890 S/P ARTHROSCOPIC RECONSTRUCTION OF ACL OF LEFT KNEE USING QUADRICEPS TENDON AUTOGRAFT: ICD-10-CM

## 2024-10-24 PROCEDURE — 99999 PR PBB SHADOW E&M-EST. PATIENT-LVL III: CPT | Mod: PBBFAC,,, | Performed by: PHYSICIAN ASSISTANT

## 2024-10-24 NOTE — PROGRESS NOTES
Patient ID: Smooth Perez  YOB: 2000  MRN: [id number]    Chief Complaint: Pain of the Left Knee    Referred By: Amniotic Trial    History of Present Illness: Smooth Perez is a  24 y.o. 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 She presents FWB/WBAT: left lower extremity with TROM brace/assistive devices. The patient has started physical therapy at [facility]. Overall there are no issues or concerns.     Past Medical History:   Past Medical History:   Diagnosis Date    Sickle cell trait      Past Surgical History:   Procedure Laterality Date    AMNION TRIAL, RECONSTRUCTION, KNEE, ACL, ARTHROSCOPIC Left 9/10/2024    Procedure: AMNION TRIAL, RECONSTRUCTION, KNEE, ACL, ARTHROSCOPIC;  Surgeon: Sarkis Meza MD;  Location: [facility];  Service: Orthopedics;  Laterality: Left;    LYSIS, ADHESIONS, KNEE, ARTHROSCOPIC Left 9/10/2024    Procedure: LYSIS, ADHESIONS, KNEE, ARTHROSCOPIC;  Surgeon: Sarkis Meza MD;  Location: [facility];  Service: Orthopedics;  Laterality: Left;     No family history on file.  Social History     Socioeconomic History    Marital status: Single   Tobacco Use    Smoking status: Never     Passive exposure: Never    Smokeless tobacco: Never   Substance and Sexual Activity    Alcohol use: Not Currently    Drug use: Not Currently     Types: Marijuana    Sexual activity: Not Currently     Partners: Male     Birth control/protection: I.U.D.     Medication List with Changes/Refills   Current Medications    DOCUSATE SODIUM (COLACE) 100 MG CAPSULE    Take 1 capsule (100 mg total) by mouth 2 (two) times daily.    HYDROCODONE-ACETAMINOPHEN (NORCO) 5-325 MG PER TABLET    Take 1 tablet by mouth every 4 to 6 hours as needed for Pain.    LEVONORGESTREL (KYLEENA)  19.5 MG IUD    Kyleena    ONDANSETRON (ZOFRAN) 4 MG TABLET    Take 1 tablet (4 mg total) by mouth every 8 (eight) hours as needed for Nausea.    VALACYCLOVIR (VALTREX) 500 MG TABLET    Take 1 tablet by mouth every morning.     Review of patient's allergies indicates:  No Known Allergies  ROS    Physical Exam:   There is no height or weight on file to calculate BMI.  There were no vitals filed for this visit.   GENERAL: Well appearing, appropriate for stated age, no acute distress.  CARDIOVASCULAR: Pulses regular by peripheral palpation.  PULMONARY: Respirations are even and non-labored.  NEURO: Awake, alert, and oriented x 3.  PSYCH: Mood & affect are appropriate.  HEENT: Head is normocephalic and atraumatic.    Ortho/SPM Exam  Left Knee Exam  No signs of infection  Minimal effusion   Knee ROM: +1- 115  All compartments are soft and compressible. Calf soft non-tender. Intact EHL, FHL, gastrocsoleus, and tibialis anterior. Sensation intact to light touch in superficial peroneal, deep peroneal, tibial, sural, and saphenous nerve distributions. Foot warm and well perfused with capillary refill of less than 2 seconds and palpable pedal pulses.       Imaging:   XR Results:  Results for orders placed during the hospital encounter of 09/18/24    X-ray Knee Ortho Left with Flexion    Narrative  EXAMINATION:  XR KNEE ORTHO LEFT WITH FLEXION    CLINICAL HISTORY:  . Other acute postprocedural pain    TECHNIQUE:  AP standing view of both knees, PA flexion standing views of both knees, and Merchant views of both knees were performed. A lateral view of the left knee was also performed.    COMPARISON:  08/21/2024    FINDINGS:  No acute osseous abnormality.  Left knee ACL repair postsurgical findings present.  Small left knee suprapatellar joint effusion suspected with mild prepatellar soft tissue edema.    Impression  As above      Electronically signed by: Zeke Berg  MD  Date:    09/18/2024  Time:    16:27            Relevant imaging results reviewed and interpreted by me, discussed with the patient and / or family today.        Provider Note/Medical Decision Making:   Paperwork for amniotic trial study completed today.     I discussed worrisome and red flag signs and symptoms with the patient. The patient expressed understanding and agreed to alert me immediately or to go to the emergency room if they experience any of these.   Treatment plan was developed with input from the patient/family, and they expressed understanding and agreement with the plan. All questions were answered today.      Trinh Carter PA-C  Sports Medicine Physician Assistant       Disclaimer: This note was prepared using a voice recognition system and is likely to have sound alike errors within the text.

## 2024-10-24 NOTE — PATIENT INSTRUCTIONS
Assessment:  Smooth Perez is a  24 y.o. female   About to start [employer] with a chief complaint of Pain of the Left Knee  6 weeks status post left knee ACL reconstruction with partial thickness quad tendon autograft and lysis of adhesions on 9/10/24   Post-op  Amnion Trial     Encounter Diagnoses   Name Primary?    S/P ACL reconstruction Yes    Atrophy of quadriceps femoris muscle     Weakness of left quadriceps muscle     Post-op pain     Decreased range of motion of left knee     Rupture of anterior cruciate ligament of left knee, subsequent encounter     S/P arthroscopic reconstruction of ACL of left knee using quadriceps tendon autograft     Recurrent left knee instability       Plan:  Continue physical therapy with [facility]: ACL reconstruction protocol   Continue to work on range of motion and quad strength  Follow up with Dr. Sarkis Meza in 6 weeks     Follow-up:6 weeks with Dr. Sarkis Meza or sooner if there are any problems between now and then.    Leave Review:   Google: Leave Google Review  Healthgrades: Leave Healthgrades Review    After Hours Number: [phone]

## 2024-10-29 ENCOUNTER — CLINICAL SUPPORT (OUTPATIENT)
Facility: HOSPITAL | Age: 24
End: 2024-10-29
Payer: COMMERCIAL

## 2024-10-29 DIAGNOSIS — Z87.39 S/P ARTHROSCOPIC RECONSTRUCTION OF ACL OF LEFT KNEE USING QUADRICEPS TENDON AUTOGRAFT: ICD-10-CM

## 2024-10-29 DIAGNOSIS — M62.559 ATROPHY OF QUADRICEPS FEMORIS MUSCLE: ICD-10-CM

## 2024-10-29 DIAGNOSIS — M25.662 DECREASED RANGE OF MOTION OF LEFT KNEE: Primary | ICD-10-CM

## 2024-10-29 DIAGNOSIS — M62.81 WEAKNESS OF LEFT QUADRICEPS MUSCLE: ICD-10-CM

## 2024-10-29 DIAGNOSIS — Z98.890 S/P ARTHROSCOPIC RECONSTRUCTION OF ACL OF LEFT KNEE USING QUADRICEPS TENDON AUTOGRAFT: ICD-10-CM

## 2024-10-29 PROCEDURE — 97140 MANUAL THERAPY 1/> REGIONS: CPT | Mod: PN | Performed by: PHYSICAL THERAPIST

## 2024-10-29 PROCEDURE — 97110 THERAPEUTIC EXERCISES: CPT | Mod: PN | Performed by: PHYSICAL THERAPIST

## 2024-10-29 PROCEDURE — 97112 NEUROMUSCULAR REEDUCATION: CPT | Mod: PN | Performed by: PHYSICAL THERAPIST

## 2024-10-31 ENCOUNTER — CLINICAL SUPPORT (OUTPATIENT)
Facility: HOSPITAL | Age: 24
End: 2024-10-31
Payer: COMMERCIAL

## 2024-10-31 DIAGNOSIS — Z87.39 S/P ARTHROSCOPIC RECONSTRUCTION OF ACL OF LEFT KNEE USING QUADRICEPS TENDON AUTOGRAFT: ICD-10-CM

## 2024-10-31 DIAGNOSIS — M62.81 WEAKNESS OF LEFT QUADRICEPS MUSCLE: ICD-10-CM

## 2024-10-31 DIAGNOSIS — Z98.890 S/P ARTHROSCOPIC RECONSTRUCTION OF ACL OF LEFT KNEE USING QUADRICEPS TENDON AUTOGRAFT: ICD-10-CM

## 2024-10-31 DIAGNOSIS — M25.662 DECREASED RANGE OF MOTION OF LEFT KNEE: Primary | ICD-10-CM

## 2024-10-31 DIAGNOSIS — M62.559 ATROPHY OF QUADRICEPS FEMORIS MUSCLE: ICD-10-CM

## 2024-10-31 PROCEDURE — 97110 THERAPEUTIC EXERCISES: CPT | Mod: PN | Performed by: PHYSICAL THERAPIST

## 2024-10-31 PROCEDURE — 97530 THERAPEUTIC ACTIVITIES: CPT | Mod: PN | Performed by: PHYSICAL THERAPIST

## 2024-10-31 PROCEDURE — 97140 MANUAL THERAPY 1/> REGIONS: CPT | Mod: PN | Performed by: PHYSICAL THERAPIST

## 2024-10-31 PROCEDURE — 97112 NEUROMUSCULAR REEDUCATION: CPT | Mod: PN | Performed by: PHYSICAL THERAPIST

## 2024-11-05 ENCOUNTER — CLINICAL SUPPORT (OUTPATIENT)
Facility: HOSPITAL | Age: 24
End: 2024-11-05
Payer: COMMERCIAL

## 2024-11-05 DIAGNOSIS — M62.559 ATROPHY OF QUADRICEPS FEMORIS MUSCLE: ICD-10-CM

## 2024-11-05 DIAGNOSIS — M25.662 DECREASED RANGE OF MOTION OF LEFT KNEE: Primary | ICD-10-CM

## 2024-11-05 DIAGNOSIS — Z87.39 S/P ARTHROSCOPIC RECONSTRUCTION OF ACL OF LEFT KNEE USING QUADRICEPS TENDON AUTOGRAFT: ICD-10-CM

## 2024-11-05 DIAGNOSIS — M62.81 WEAKNESS OF LEFT QUADRICEPS MUSCLE: ICD-10-CM

## 2024-11-05 DIAGNOSIS — Z98.890 S/P ARTHROSCOPIC RECONSTRUCTION OF ACL OF LEFT KNEE USING QUADRICEPS TENDON AUTOGRAFT: ICD-10-CM

## 2024-11-05 PROCEDURE — 97110 THERAPEUTIC EXERCISES: CPT | Mod: PN | Performed by: PHYSICAL THERAPIST

## 2024-11-05 PROCEDURE — 97112 NEUROMUSCULAR REEDUCATION: CPT | Mod: PN | Performed by: PHYSICAL THERAPIST

## 2024-11-05 PROCEDURE — 97530 THERAPEUTIC ACTIVITIES: CPT | Mod: PN | Performed by: PHYSICAL THERAPIST

## 2024-11-05 PROCEDURE — 97140 MANUAL THERAPY 1/> REGIONS: CPT | Mod: PN | Performed by: PHYSICAL THERAPIST

## 2024-11-05 NOTE — PROGRESS NOTES
[facility]   Physical Therapy Treatment Note     Name: Smooth Perez  Clinic Number: [id number]    Therapy Diagnosis:   Encounter Diagnoses   Name Primary?    Decreased range of motion of left knee Yes    Atrophy of quadriceps femoris muscle     Weakness of left quadriceps muscle     S/P arthroscopic reconstruction of ACL of left knee using quadriceps tendon autograft      Physician: Sarkis Meza MD    Visit Date: 11/5/2024    Physician Orders: PT Eval and Treat  Medical Diagnosis from Referral: Peripheral tear of medial meniscus of left knee as current injury, subsequent encounter [S83.222D], Rupture of anterior cruciate ligament of left knee, subsequent encounter [S83.512D]   Evaluation Date: 9/12/2024  Authorization Period Expiration: 12/31/24  Plan of Care Expiration: 12/31//2024  Progress Note Due: 11/17/24  Visit # / Visits authorized: 17/22 (1/1 eval)  FOTO: 1/3 (last performed on 9/12/2024)    Date of Surgery   9/10/24   Surgery Performed   AMNION TRIAL, RECONSTRUCTION, KNEE, ACL, ARTHROSCOPIC (Left)  LYSIS, ADHESIONS, KNEE, ARTHROSCOPIC (Left)      Post-Op Precautions   ACL recon protocol  Weight bearing: as tolerated  Range of motion: as tolerated  Antibiotics: 5 days of PO prophylactic antibiotics  DVT Ppx: Eliquis  PT/OT: Start POD#3  Follow-up: 10-14 days with AP/Lateral of operative knee (non-weightbearing)       Time In: 11:00 AM  Time Out: 12:10 PM  Total Billable Time: 70 minutes    SUBJECTIVE     Pt reports: she is feeling better overall. She has no new complaints today.   She was compliant with home exercise program.  Response to previous treatment: no notable change  Functional change: no notable change    Pain: 2/10  Location: left knee    OBJECTIVE     10/17/24:  Able to reach full knee extension in CKC. Still unable to perform SLR without lag.     10/31:  Full knee extension range of motion in [place] and CKC. Flexion to 135 degrees. Very mild lag at  terminal extension.    Treatment     Smooth received the treatments listed below:      Intervention Code  (see below chart) 11/5/24   Upright Bike TE 5 minutes   [employer]:  Hamstring Scoops  Quad Pulls TA 1 x 10 yards each   Manual Therapy  MT Patella mobs; knee PROM to improve flexion and extension   Single Leg Shuttle Squats NR 3 bands; 3x10; focus on reaching terminal extension at top   Step Up NR 3x10 - 6 inches   Step Down NR 3x10 - 6 inches   Tank Pull TE 5 x 20 yard laps     10 minutes of Therapeutic Exercise (TE) to develop strength, endurance, ROM, and flexibility. (48924)  10 minutes of Manual therapy (MT) to improve pain and ROM. (36969)  40 minutes of Neuromuscular Re-Education (NR)  to improve: Balance, Coordination, Kinesthetic, Sense, Proprioception, and Posture. (48924)  10 minutes of Therapeutic Activities (TA) to improve functional performance. (89151)   Unattended Electrical Stimulation (ES) for muscle performance and/or pain modulation. (89595)   Vasopneumatic Device Therapy () for management of swelling/edema. (55954)  BFR: Blood flow restriction applied during exercise  NP: Not Performed     Patient Education and Home Exercises     Home Exercises Provided and Patient Education Provided     Education provided:   - HEP, PT POC    Written Home Exercises Provided: yes. Exercises were reviewed and Smooth was able to demonstrate them prior to the end of the session.  Smooth demonstrated good  understanding of the education provided. See EMR under Patient Instructions for exercises provided during therapy sessions    ASSESSMENT     Patient tolerated today's treatment well. She demonstrates improved motor control and quad control in CKC. Still mild lag with SLR. She will benefit from continued care.    Smooth Is progressing well towards her goals.   Pt prognosis is Excellent.     Pt will continue to benefit from skilled outpatient physical therapy to address the deficits listed in the  problem list box on initial evaluation, provide pt/family education and to maximize pt's level of independence in the home and community environment.     Pt's spiritual, cultural and educational needs considered and pt agreeable to plan of care and goals.     Anticipated barriers to physical therapy: None    Goals:  Short Term Goals:  6 weeks Status  Date Met   PAIN: Pt will report worst pain of 4/10 in order to progress toward max functional ability and improve quality of life. [x] Progressing  [] Met  [] Not Met     FUNCTION: Patient will demonstrate improved function as indicated by a functional score of greater than or equal to 5 out of 100 on FOTO. [x] Progressing  [] Met  [] Not Met     MOBILITY: Patient will improve AROM to 50% of stated goals, listed in objective measures above, in order to progress towards independence with functional activities.  [x] Progressing  [] Met  [] Not Met     STRENGTH: Patient will improve strength to 40% of stated goals, listed in objective measures above, in order to progress towards independence with functional activities. [x] Progressing  [] Met  [] Not Met     HEP: Patient will demonstrate independence with HEP in order to progress toward functional independence. [x] Progressing  [] Met  [] Not Met        Long Term Goals:  12 weeks Status Date Met   PAIN: Pt will report worst pain of 2/10 in order to progress toward max functional ability and improve quality of life [x] Progressing  [] Met  [] Not Met     FUNCTION: Patient will demonstrate improved function as indicated by a functional score of greater than or equal to 75 out of 100 on FOTO. [x] Progressing  [] Met  [] Not Met     MOBILITY: Patient will improve AROM to stated goals, listed in objective measures above, in order to return to maximal functional potential and improve quality of life. Goal: Full L knee ROM [x] Progressing  [] Met  [] Not Met     STRENGTH: Patient will improve strength to stated goals, listed in  objective measures above, in order to improve functional independence and quality of life. Goal: 75% symmetry on Biodex Testing for LLE [x] Progressing  [] Met  [] Not Met     GAIT: Patient will demonstrate normalized gait mechanics with minimal compensation in order to return to PLOF. [x] Progressing  [] Met  [] Not Met        Final Goals:  9 months post-op Status Date Met   PAIN: Pt will report worst pain of 1/10 in order to progress toward max functional ability and improve quality of life [x] Progressing  [] Met  [] Not Met     FUNCTION: Patient will demonstrate improved function as indicated by a functional score of greater than or equal to 90 out of 100 on FOTO. [x] Progressing  [] Met  [] Not Met     STRENGTH: Patient will improve strength to stated goals, listed in objective measures above, in order to improve functional independence and quality of life. Goal: 95% symmetry on Biodex Testing for LLE [x] Progressing  [] Met  [] Not Met     Patient will return to normal ADL's, IADL's, community involvement, recreational activities, and work-related activities including volleyball. [x] Progressing  [] Met  [] Not Met         PLAN   Continue per plan of care.  Progress as tolerated.    Iraj Huang, PT

## 2024-11-12 ENCOUNTER — CLINICAL SUPPORT (OUTPATIENT)
Facility: HOSPITAL | Age: 24
End: 2024-11-12
Payer: COMMERCIAL

## 2024-11-12 DIAGNOSIS — Z87.39 S/P ARTHROSCOPIC RECONSTRUCTION OF ACL OF LEFT KNEE USING QUADRICEPS TENDON AUTOGRAFT: ICD-10-CM

## 2024-11-12 DIAGNOSIS — M62.81 WEAKNESS OF LEFT QUADRICEPS MUSCLE: ICD-10-CM

## 2024-11-12 DIAGNOSIS — Z98.890 S/P ARTHROSCOPIC RECONSTRUCTION OF ACL OF LEFT KNEE USING QUADRICEPS TENDON AUTOGRAFT: ICD-10-CM

## 2024-11-12 DIAGNOSIS — M25.662 DECREASED RANGE OF MOTION OF LEFT KNEE: Primary | ICD-10-CM

## 2024-11-12 DIAGNOSIS — M62.559 ATROPHY OF QUADRICEPS FEMORIS MUSCLE: ICD-10-CM

## 2024-11-12 PROCEDURE — 97112 NEUROMUSCULAR REEDUCATION: CPT | Mod: PN | Performed by: PHYSICAL THERAPIST

## 2024-11-12 PROCEDURE — 97140 MANUAL THERAPY 1/> REGIONS: CPT | Mod: PN | Performed by: PHYSICAL THERAPIST

## 2024-11-12 PROCEDURE — 97530 THERAPEUTIC ACTIVITIES: CPT | Mod: PN | Performed by: PHYSICAL THERAPIST

## 2024-11-12 PROCEDURE — 97110 THERAPEUTIC EXERCISES: CPT | Mod: PN | Performed by: PHYSICAL THERAPIST

## 2024-11-12 NOTE — PROGRESS NOTES
[facility]   Physical Therapy Treatment Note     Name: Smooth Perez  Clinic Number: [id number]    Therapy Diagnosis:   Encounter Diagnoses   Name Primary?    Decreased range of motion of left knee Yes    Atrophy of quadriceps femoris muscle     Weakness of left quadriceps muscle     S/P arthroscopic reconstruction of ACL of left knee using quadriceps tendon autograft      Physician: Sarkis Meza MD    Visit Date: 11/12/2024    Physician Orders: PT Eval and Treat  Medical Diagnosis from Referral: Peripheral tear of medial meniscus of left knee as current injury, subsequent encounter [S83.222D], Rupture of anterior cruciate ligament of left knee, subsequent encounter [S83.512D]   Evaluation Date: 9/12/2024  Authorization Period Expiration: 12/31/24  Plan of Care Expiration: 12/31//2024  Progress Note Due: 11/17/24  Visit # / Visits authorized: 17/22 (1/1 eval)  FOTO: 1/3 (last performed on 9/12/2024)    Date of Surgery   9/10/24   Surgery Performed   AMNION TRIAL, RECONSTRUCTION, KNEE, ACL, ARTHROSCOPIC (Left)  LYSIS, ADHESIONS, KNEE, ARTHROSCOPIC (Left)      Post-Op Precautions   ACL recon protocol  Weight bearing: as tolerated  Range of motion: as tolerated  Antibiotics: 5 days of PO prophylactic antibiotics  DVT Ppx: Eliquis  PT/OT: Start POD#3  Follow-up: 10-14 days with AP/Lateral of operative knee (non-weightbearing)       Time In: 11:00 AM  Time Out: 12:10 PM  Total Billable Time: 70 minutes    SUBJECTIVE     Pt reports: she continues to notice improvement in her strength and function. She has no new complaints today.  She was compliant with home exercise program.  Response to previous treatment: no notable change  Functional change: no notable change    Pain: 2/10  Location: left knee    OBJECTIVE     10/17/24:  Able to reach full knee extension in CKC. Still unable to perform SLR without lag.     10/31:  Full knee extension range of motion in [facility] and CKC. Flexion to  135 degrees. Very mild lag at terminal extension.    Treatment     Smooth received the treatments listed below:      Intervention Code  (see below chart) 11/12/24   Upright Bike TE 5 minutes   [employer]:  Hamstring Scoops  Quad Pulls TA 1 x 10 yards each   Manual Therapy  MT Patella mobs; knee PROM to improve flexion and extension   Single Leg Shuttle Squats NR 3 bands; 3x10; focus on reaching terminal extension at top   Split Squats NR 3x10   Step Up NR 3x10 - 6 inches   Step Down NR 3x10 - 6 inches   Tank Pull TE 5 x 20 yard laps; L3     10 minutes of Therapeutic Exercise (TE) to develop strength, endurance, ROM, and flexibility. (47894)  10 minutes of Manual therapy (MT) to improve pain and ROM. (99999)  40 minutes of Neuromuscular Re-Education (NR)  to improve: Balance, Coordination, Kinesthetic, Sense, Proprioception, and Posture. (13891)  10 minutes of Therapeutic Activities (TA) to improve functional performance. (05786)   Unattended Electrical Stimulation (ES) for muscle performance and/or pain modulation. (29174)   Vasopneumatic Device Therapy () for management of swelling/edema. (95190)  BFR: Blood flow restriction applied during exercise  NP: Not Performed     Patient Education and Home Exercises     Home Exercises Provided and Patient Education Provided     Education provided:   - HEP, PT POC    Written Home Exercises Provided: yes. Exercises were reviewed and Smooth was able to demonstrate them prior to the end of the session.  Smooth demonstrated good  understanding of the education provided. See EMR under Patient Instructions for exercises provided during therapy sessions    ASSESSMENT     Patient tolerated today's treatment well. She demonstrates improved motor control and quad control in CKC. Still mild lag with SLR. She will benefit from continued care.    Smooth Is progressing well towards her goals.   Pt prognosis is Excellent.     Pt will continue to benefit from skilled outpatient  physical therapy to address the deficits listed in the problem list box on initial evaluation, provide pt/family education and to maximize pt's level of independence in the home and community environment.     Pt's spiritual, cultural and educational needs considered and pt agreeable to plan of care and goals.     Anticipated barriers to physical therapy: None    Goals:  Short Term Goals:  6 weeks Status  Date Met   PAIN: Pt will report worst pain of 4/10 in order to progress toward max functional ability and improve quality of life. [x] Progressing  [] Met  [] Not Met     FUNCTION: Patient will demonstrate improved function as indicated by a functional score of greater than or equal to 5 out of 100 on FOTO. [x] Progressing  [] Met  [] Not Met     MOBILITY: Patient will improve AROM to 50% of stated goals, listed in objective measures above, in order to progress towards independence with functional activities.  [x] Progressing  [] Met  [] Not Met     STRENGTH: Patient will improve strength to 40% of stated goals, listed in objective measures above, in order to progress towards independence with functional activities. [x] Progressing  [] Met  [] Not Met     HEP: Patient will demonstrate independence with HEP in order to progress toward functional independence. [x] Progressing  [] Met  [] Not Met        Long Term Goals:  12 weeks Status Date Met   PAIN: Pt will report worst pain of 2/10 in order to progress toward max functional ability and improve quality of life [x] Progressing  [] Met  [] Not Met     FUNCTION: Patient will demonstrate improved function as indicated by a functional score of greater than or equal to 75 out of 100 on FOTO. [x] Progressing  [] Met  [] Not Met     MOBILITY: Patient will improve AROM to stated goals, listed in objective measures above, in order to return to maximal functional potential and improve quality of life. Goal: Full L knee ROM [x] Progressing  [] Met  [] Not Met     STRENGTH:  Patient will improve strength to stated goals, listed in objective measures above, in order to improve functional independence and quality of life. Goal: 75% symmetry on Biodex Testing for LLE [x] Progressing  [] Met  [] Not Met     GAIT: Patient will demonstrate normalized gait mechanics with minimal compensation in order to return to PLOF. [x] Progressing  [] Met  [] Not Met        Final Goals:  9 months post-op Status Date Met   PAIN: Pt will report worst pain of 1/10 in order to progress toward max functional ability and improve quality of life [x] Progressing  [] Met  [] Not Met     FUNCTION: Patient will demonstrate improved function as indicated by a functional score of greater than or equal to 90 out of 100 on FOTO. [x] Progressing  [] Met  [] Not Met     STRENGTH: Patient will improve strength to stated goals, listed in objective measures above, in order to improve functional independence and quality of life. Goal: 95% symmetry on Biodex Testing for LLE [x] Progressing  [] Met  [] Not Met     Patient will return to normal ADL's, IADL's, community involvement, recreational activities, and work-related activities including volleyball. [x] Progressing  [] Met  [] Not Met         PLAN   Continue per plan of care.  Progress as tolerated.    Iraj Huang, PT

## 2024-11-13 ENCOUNTER — CLINICAL SUPPORT (OUTPATIENT)
Facility: HOSPITAL | Age: 24
End: 2024-11-13
Payer: COMMERCIAL

## 2024-11-13 DIAGNOSIS — M25.662 DECREASED RANGE OF MOTION OF LEFT KNEE: Primary | ICD-10-CM

## 2024-11-13 DIAGNOSIS — Z87.39 S/P ARTHROSCOPIC RECONSTRUCTION OF ACL OF LEFT KNEE USING QUADRICEPS TENDON AUTOGRAFT: ICD-10-CM

## 2024-11-13 DIAGNOSIS — M62.81 WEAKNESS OF LEFT QUADRICEPS MUSCLE: ICD-10-CM

## 2024-11-13 DIAGNOSIS — M62.559 ATROPHY OF QUADRICEPS FEMORIS MUSCLE: ICD-10-CM

## 2024-11-13 DIAGNOSIS — Z98.890 S/P ARTHROSCOPIC RECONSTRUCTION OF ACL OF LEFT KNEE USING QUADRICEPS TENDON AUTOGRAFT: ICD-10-CM

## 2024-11-13 PROCEDURE — 97140 MANUAL THERAPY 1/> REGIONS: CPT | Mod: PN | Performed by: PHYSICAL THERAPIST

## 2024-11-13 PROCEDURE — 97110 THERAPEUTIC EXERCISES: CPT | Mod: PN | Performed by: PHYSICAL THERAPIST

## 2024-11-13 PROCEDURE — 97112 NEUROMUSCULAR REEDUCATION: CPT | Mod: PN | Performed by: PHYSICAL THERAPIST

## 2024-11-13 PROCEDURE — 97530 THERAPEUTIC ACTIVITIES: CPT | Mod: PN | Performed by: PHYSICAL THERAPIST

## 2024-11-13 NOTE — PROGRESS NOTES
[facility]   Physical Therapy Treatment Note     Name: Smooth Perez  Clinic Number: [id number]    Therapy Diagnosis:   Encounter Diagnoses   Name Primary?    Decreased range of motion of left knee Yes    Atrophy of quadriceps femoris muscle     Weakness of left quadriceps muscle     S/P arthroscopic reconstruction of ACL of left knee using quadriceps tendon autograft      Physician: Sarkis Meza MD    Visit Date: 11/13/2024    Physician Orders: PT Eval and Treat  Medical Diagnosis from Referral: Peripheral tear of medial meniscus of left knee as current injury, subsequent encounter [S83.222D], Rupture of anterior cruciate ligament of left knee, subsequent encounter [S83.512D]   Evaluation Date: 9/12/2024  Authorization Period Expiration: 12/31/24  Plan of Care Expiration: 12/31//2024  Progress Note Due: 11/17/24  Visit # / Visits authorized: 19/22 (1/1 eval)  FOTO: 1/3 (last performed on 9/12/2024)    Date of Surgery   9/10/24   Surgery Performed   AMNION TRIAL, RECONSTRUCTION, KNEE, ACL, ARTHROSCOPIC (Left)  LYSIS, ADHESIONS, KNEE, ARTHROSCOPIC (Left)      Post-Op Precautions   ACL recon protocol  Weight bearing: as tolerated  Range of motion: as tolerated  Antibiotics: 5 days of PO prophylactic antibiotics  DVT Ppx: Eliquis  PT/OT: Start POD#3  Follow-up: 10-14 days with AP/Lateral of operative knee (non-weightbearing)       Time In: 8:45 AM  Time Out: 09:55 PM  Total Billable Time: 70 minutes    SUBJECTIVE     Pt reports: she has been working on terminal knee extension consistently. She reports she is somewhat sore from her last visit.  She was compliant with home exercise program.  Response to previous treatment: no notable change  Functional change: no notable change    Pain: 2/10  Location: left knee    OBJECTIVE     10/17/24:  Able to reach full knee extension in CKC. Still unable to perform SLR without lag.     10/31:  Full knee extension range of motion in OK[facility] and  CKC. Flexion to 135 degrees. Very mild lag at terminal extension.    Treatment     Smooth received the treatments listed below:      Intervention Code  (see below chart) 11/13/24   Upright Bike TE 5 minutes   [employer]:  Hamstring Scoops  Quad Pulls TA 1 x 10 yards each   Manual Therapy  MT Patella mobs; knee PROM to improve flexion and extension   Single Leg Shuttle Squats NR 4 bands; 3x10; focus on reaching terminal extension at top   Split Squats NR 3x10   Step Up NR 3x10 - 12 inches   Step Down NR 3x10 - 6 inches   Tank Pull TE 5 x 20 yard laps; L3     10 minutes of Therapeutic Exercise (TE) to develop strength, endurance, ROM, and flexibility. (57952)  10 minutes of Manual therapy (MT) to improve pain and ROM. (17672)  40 minutes of Neuromuscular Re-Education (NR)  to improve: Balance, Coordination, Kinesthetic, Sense, Proprioception, and Posture. (14978)  10 minutes of Therapeutic Activities (TA) to improve functional performance. (09795)   Unattended Electrical Stimulation (ES) for muscle performance and/or pain modulation. (62651)   Vasopneumatic Device Therapy () for management of swelling/edema. (89101)  BFR: Blood flow restriction applied during exercise  NP: Not Performed      Patient Education and Home Exercises     Home Exercises Provided and Patient Education Provided     Education provided:   - HEP, PT POC    Written Home Exercises Provided: yes. Exercises were reviewed and Smooth was able to demonstrate them prior to the end of the session.  Smooth demonstrated good  understanding of the education provided. See EMR under Patient Instructions for exercises provided during therapy sessions    ASSESSMENT     Good progression of strength. Smooth will benefit from continued care.    Smooth Is progressing well towards her goals.   Pt prognosis is Excellent.     Pt will continue to benefit from skilled outpatient physical therapy to address the deficits listed in the problem list box on  initial evaluation, provide pt/family education and to maximize pt's level of independence in the home and community environment.     Pt's spiritual, cultural and educational needs considered and pt agreeable to plan of care and goals.     Anticipated barriers to physical therapy: None    Goals:  Short Term Goals:  6 weeks Status  Date Met   PAIN: Pt will report worst pain of 4/10 in order to progress toward max functional ability and improve quality of life. [x] Progressing  [] Met  [] Not Met     FUNCTION: Patient will demonstrate improved function as indicated by a functional score of greater than or equal to 5 out of 100 on FOTO. [x] Progressing  [] Met  [] Not Met     MOBILITY: Patient will improve AROM to 50% of stated goals, listed in objective measures above, in order to progress towards independence with functional activities.  [x] Progressing  [] Met  [] Not Met     STRENGTH: Patient will improve strength to 40% of stated goals, listed in objective measures above, in order to progress towards independence with functional activities. [x] Progressing  [] Met  [] Not Met     HEP: Patient will demonstrate independence with HEP in order to progress toward functional independence. [x] Progressing  [] Met  [] Not Met        Long Term Goals:  12 weeks Status Date Met   PAIN: Pt will report worst pain of 2/10 in order to progress toward max functional ability and improve quality of life [x] Progressing  [] Met  [] Not Met     FUNCTION: Patient will demonstrate improved function as indicated by a functional score of greater than or equal to 75 out of 100 on FOTO. [x] Progressing  [] Met  [] Not Met     MOBILITY: Patient will improve AROM to stated goals, listed in objective measures above, in order to return to maximal functional potential and improve quality of life. Goal: Full L knee ROM [x] Progressing  [] Met  [] Not Met     STRENGTH: Patient will improve strength to stated goals, listed in objective measures  above, in order to improve functional independence and quality of life. Goal: 75% symmetry on Biodex Testing for LLE [x] Progressing  [] Met  [] Not Met     GAIT: Patient will demonstrate normalized gait mechanics with minimal compensation in order to return to PLOF. [x] Progressing  [] Met  [] Not Met        Final Goals:  9 months post-op Status Date Met   PAIN: Pt will report worst pain of 1/10 in order to progress toward max functional ability and improve quality of life [x] Progressing  [] Met  [] Not Met     FUNCTION: Patient will demonstrate improved function as indicated by a functional score of greater than or equal to 90 out of 100 on FOTO. [x] Progressing  [] Met  [] Not Met     STRENGTH: Patient will improve strength to stated goals, listed in objective measures above, in order to improve functional independence and quality of life. Goal: 95% symmetry on Biodex Testing for LLE [x] Progressing  [] Met  [] Not Met     Patient will return to normal ADL's, IADL's, community involvement, recreational activities, and work-related activities including volleyball. [x] Progressing  [] Met  [] Not Met         PLAN   Continue per plan of care.  Progress as tolerated.    Iraj Huang, PT

## 2024-11-19 ENCOUNTER — CLINICAL SUPPORT (OUTPATIENT)
Facility: HOSPITAL | Age: 24
End: 2024-11-19
Payer: COMMERCIAL

## 2024-11-19 DIAGNOSIS — M62.559 ATROPHY OF QUADRICEPS FEMORIS MUSCLE: ICD-10-CM

## 2024-11-19 DIAGNOSIS — M25.662 DECREASED RANGE OF MOTION OF LEFT KNEE: Primary | ICD-10-CM

## 2024-11-19 DIAGNOSIS — Z98.890 S/P ARTHROSCOPIC RECONSTRUCTION OF ACL OF LEFT KNEE USING QUADRICEPS TENDON AUTOGRAFT: ICD-10-CM

## 2024-11-19 DIAGNOSIS — Z87.39 S/P ARTHROSCOPIC RECONSTRUCTION OF ACL OF LEFT KNEE USING QUADRICEPS TENDON AUTOGRAFT: ICD-10-CM

## 2024-11-19 DIAGNOSIS — M62.81 WEAKNESS OF LEFT QUADRICEPS MUSCLE: ICD-10-CM

## 2024-11-19 PROCEDURE — 97112 NEUROMUSCULAR REEDUCATION: CPT | Mod: PN | Performed by: PHYSICAL THERAPIST

## 2024-11-19 PROCEDURE — 97140 MANUAL THERAPY 1/> REGIONS: CPT | Mod: PN | Performed by: PHYSICAL THERAPIST

## 2024-11-19 PROCEDURE — 97530 THERAPEUTIC ACTIVITIES: CPT | Mod: PN | Performed by: PHYSICAL THERAPIST

## 2024-11-19 NOTE — PROGRESS NOTES
[facility]   Physical Therapy Treatment Note     Name: Smooth Perez  Clinic Number: [id number]    Therapy Diagnosis:   Encounter Diagnoses   Name Primary?    Decreased range of motion of left knee Yes    Atrophy of quadriceps femoris muscle     Weakness of left quadriceps muscle     S/P arthroscopic reconstruction of ACL of left knee using quadriceps tendon autograft      Physician: Sarkis Meza MD    Visit Date: 11/19/2024    Physician Orders: PT Eval and Treat  Medical Diagnosis from Referral: Peripheral tear of medial meniscus of left knee as current injury, subsequent encounter [S83.222D], Rupture of anterior cruciate ligament of left knee, subsequent encounter [S83.512D]   Evaluation Date: 9/12/2024  Authorization Period Expiration: 12/31/24  Plan of Care Expiration: 12/31//2024  Progress Note Due: 12/19/24  Visit # / Visits authorized: 20/22 (1/1 eval)  FOTO: 1/3 (last performed on 9/12/2024)    Date of Surgery   9/10/24   Surgery Performed   AMNION TRIAL, RECONSTRUCTION, KNEE, ACL, ARTHROSCOPIC (Left)  LYSIS, ADHESIONS, KNEE, ARTHROSCOPIC (Left)      Post-Op Precautions   ACL recon protocol  Weight bearing: as tolerated  Range of motion: as tolerated  Antibiotics: 5 days of PO prophylactic antibiotics  DVT Ppx: Eliquis  PT/OT: Start POD#3  Follow-up: 10-14 days with AP/Lateral of operative knee (non-weightbearing)       Time In: 10:50 AM  Time Out: 12:00 PM  Total Billable Time: 70 minutes    SUBJECTIVE     Pt reports: she notices a little bit of anterior knee pain with full knee extension but otherwise is doing well.  She was compliant with home exercise program.  Response to previous treatment: no notable change  Functional change: no notable change    Pain: 2/10  Location: left knee    OBJECTIVE     10/17/24:  Able to reach full knee extension in CKC. Still unable to perform SLR without lag.     10/31:  Full knee extension range of motion in [facility] and CKC. Flexion to  135 degrees. Very mild lag at terminal extension.    11/19/24:  Able to achieve full knee extension in [facility] and CKC with warm-up and/or overpressure. No lag with SLR at terminal extension. Flexion to 140 degrees.    Treatment     Smooth received the treatments listed below:      Intervention Code  (see below chart) 11/19/24   Upright Bike TE 5 minutes   [employer]:  Hamstring Scoops  Quad Pulls TA 1 x 10 yards each   Manual Therapy  MT Patella mobs; knee PROM to improve flexion and extension   Single Leg Shuttle Squats NR 4 bands; 3x10; focus on reaching terminal extension at top ; 3 quad sets at terminal extension   Self Extension mobilizations with quad set and SLR NR 3x10   Barbell squats with force plate biofeedback NR 3x10 - squat to box; 45# barbell               5  minutes of Therapeutic Exercise (TE) to develop strength, endurance, ROM, and flexibility. (92705)  10 minutes of Manual therapy (MT) to improve pain and ROM. (05017)  40 minutes of Neuromuscular Re-Education (NR)  to improve: Balance, Coordination, Kinesthetic, Sense, Proprioception, and Posture. (24462)  10 minutes of Therapeutic Activities (TA) to improve functional performance. (52138)   Unattended Electrical Stimulation (ES) for muscle performance and/or pain modulation. (93863)   Vasopneumatic Device Therapy () for management of swelling/edema. (92526)  BFR: Blood flow restriction applied during exercise  NP: Not Performed      Patient Education and Home Exercises     Home Exercises Provided and Patient Education Provided     Education provided:   - HEP, PT POC    Written Home Exercises Provided: yes. Exercises were reviewed and Smooth was able to demonstrate them prior to the end of the session.  Smooth demonstrated good  understanding of the education provided. See EMR under Patient Instructions for exercises provided during therapy sessions    ASSESSMENT     Good progression of lag-free SLR today. Also improved in flexion range  of motion. Encouraged continued commitment to maintaining full knee extension and quad control in full knee range of motion. She will benefit from continued care.    Smooth Is progressing well towards her goals.   Pt prognosis is Excellent.     Pt will continue to benefit from skilled outpatient physical therapy to address the deficits listed in the problem list box on initial evaluation, provide pt/family education and to maximize pt's level of independence in the home and community environment.     Pt's spiritual, cultural and educational needs considered and pt agreeable to plan of care and goals.     Anticipated barriers to physical therapy: None    Goals:  Short Term Goals:  6 weeks Status  Date Met   PAIN: Pt will report worst pain of 4/10 in order to progress toward max functional ability and improve quality of life. [] Progressing  [x] Met  [] Not Met     FUNCTION: Patient will demonstrate improved function as indicated by a functional score of greater than or equal to 5 out of 100 on FOTO. [x] Progressing  [] Met  [] Not Met     MOBILITY: Patient will improve AROM to 50% of stated goals, listed in objective measures above, in order to progress towards independence with functional activities.  [x] Progressing  [] Met  [] Not Met     STRENGTH: Patient will improve strength to 40% of stated goals, listed in objective measures above, in order to progress towards independence with functional activities. [x] Progressing  [] Met  [] Not Met     HEP: Patient will demonstrate independence with HEP in order to progress toward functional independence. [x] Progressing  [] Met  [] Not Met        Long Term Goals:  12 weeks Status Date Met   PAIN: Pt will report worst pain of 2/10 in order to progress toward max functional ability and improve quality of life [x] Progressing  [] Met  [] Not Met     FUNCTION: Patient will demonstrate improved function as indicated by a functional score of greater than or equal to 75 out of  100 on FOTO. [x] Progressing  [] Met  [] Not Met     MOBILITY: Patient will improve AROM to stated goals, listed in objective measures above, in order to return to maximal functional potential and improve quality of life. Goal: Full L knee ROM [x] Progressing  [] Met  [] Not Met     STRENGTH: Patient will improve strength to stated goals, listed in objective measures above, in order to improve functional independence and quality of life. Goal: 75% symmetry on Biodex Testing for LLE [x] Progressing  [] Met  [] Not Met     GAIT: Patient will demonstrate normalized gait mechanics with minimal compensation in order to return to PLOF. [x] Progressing  [] Met  [] Not Met        Final Goals:  9 months post-op Status Date Met   PAIN: Pt will report worst pain of 1/10 in order to progress toward max functional ability and improve quality of life [x] Progressing  [] Met  [] Not Met     FUNCTION: Patient will demonstrate improved function as indicated by a functional score of greater than or equal to 90 out of 100 on FOTO. [x] Progressing  [] Met  [] Not Met     STRENGTH: Patient will improve strength to stated goals, listed in objective measures above, in order to improve functional independence and quality of life. Goal: 95% symmetry on Biodex Testing for LLE [x] Progressing  [] Met  [] Not Met     Patient will return to normal ADL's, IADL's, community involvement, recreational activities, and work-related activities including volleyball. [x] Progressing  [] Met  [] Not Met         PLAN   Continue per plan of care.  Progress as tolerated.    Iraj Huang, PT

## 2024-11-21 ENCOUNTER — CLINICAL SUPPORT (OUTPATIENT)
Facility: HOSPITAL | Age: 24
End: 2024-11-21
Payer: COMMERCIAL

## 2024-11-21 DIAGNOSIS — Z87.39 S/P ARTHROSCOPIC RECONSTRUCTION OF ACL OF LEFT KNEE USING QUADRICEPS TENDON AUTOGRAFT: ICD-10-CM

## 2024-11-21 DIAGNOSIS — M25.662 DECREASED RANGE OF MOTION OF LEFT KNEE: Primary | ICD-10-CM

## 2024-11-21 DIAGNOSIS — Z98.890 S/P ARTHROSCOPIC RECONSTRUCTION OF ACL OF LEFT KNEE USING QUADRICEPS TENDON AUTOGRAFT: ICD-10-CM

## 2024-11-21 DIAGNOSIS — M62.81 WEAKNESS OF LEFT QUADRICEPS MUSCLE: ICD-10-CM

## 2024-11-21 DIAGNOSIS — M62.559 ATROPHY OF QUADRICEPS FEMORIS MUSCLE: ICD-10-CM

## 2024-11-21 PROCEDURE — 97530 THERAPEUTIC ACTIVITIES: CPT | Mod: PN | Performed by: PHYSICAL THERAPIST

## 2024-11-21 PROCEDURE — 97750 PHYSICAL PERFORMANCE TEST: CPT | Mod: PN | Performed by: PHYSICAL THERAPIST

## 2024-11-21 PROCEDURE — 97110 THERAPEUTIC EXERCISES: CPT | Mod: PN | Performed by: PHYSICAL THERAPIST

## 2024-11-21 NOTE — PROGRESS NOTES
[facility]   Physical Therapy Treatment Note     Name: Smooth Perez  Clinic Number: [id number]    Therapy Diagnosis:   Encounter Diagnoses   Name Primary?    Decreased range of motion of left knee Yes    Atrophy of quadriceps femoris muscle     Weakness of left quadriceps muscle     S/P arthroscopic reconstruction of ACL of left knee using quadriceps tendon autograft      Physician: Sarkis Meza MD    Visit Date: 11/21/2024    Physician Orders: PT Eval and Treat  Medical Diagnosis from Referral: Peripheral tear of medial meniscus of left knee as current injury, subsequent encounter [S83.222D], Rupture of anterior cruciate ligament of left knee, subsequent encounter [S83.512D]   Evaluation Date: 9/12/2024  Authorization Period Expiration: 12/31/24  Plan of Care Expiration: 12/31//2024  Progress Note Due: 12/21/24  Visit # / Visits authorized: 21/22 (1/1 eval)  FOTO: 1/3 (last performed on 9/12/2024)    Date of Surgery   9/10/24   Surgery Performed   AMNION TRIAL, RECONSTRUCTION, KNEE, ACL, ARTHROSCOPIC (Left)  LYSIS, ADHESIONS, KNEE, ARTHROSCOPIC (Left)      Post-Op Precautions   ACL recon protocol  Weight bearing: as tolerated  Range of motion: as tolerated  Antibiotics: 5 days of PO prophylactic antibiotics  DVT Ppx: Eliquis  PT/OT: Start POD#3  Follow-up: 10-14 days with AP/Lateral of operative knee (non-weightbearing)       Time In: 1:00 PM  Time Out: 2:00 PM  Total Billable Time: 60 minutes    SUBJECTIVE     Pt reports: she is doing well today. She reports she has noticed improved strength but that she still feels weak on her surgical leg.  She was compliant with home exercise program.  Response to previous treatment: no notable change  Functional change: no notable change    Pain: 2/10  Location: left knee    OBJECTIVE     10/17/24:  Able to reach full knee extension in CKC. Still unable to perform SLR without lag.     10/31:  Full knee extension range of motion in OKC  and CKC. Flexion to 135 degrees. Very mild lag at terminal extension.    11/19/24:  Able to achieve full knee extension in OKC and CKC with warm-up and/or overpressure. No lag with SLR at terminal extension. Flexion to 140 degrees.              Treatment     Smooth received the treatments listed below:      Intervention Code  (see below chart) 11/21/24   Upright Bike TE 5 minutes   Walking Dynamics:  Hamstring Scoops  Quad Pulls TA 1 x 10 yards each  Matrix Knee Extension and Flexion   Physical Performance Test Physical Performance Test Biodex Knee Flexion and Extension; see objective section for results     15  minutes of Therapeutic Exercise (TE) to develop strength, endurance, ROM, and flexibility. (33222)  0 minutes of Manual therapy (MT) to improve pain and ROM. (08191)  0 minutes of Neuromuscular Re-Education (NR)  to improve: Balance, Coordination, Kinesthetic, Sense, Proprioception, and Posture. (57201)  10 minutes of Therapeutic Activities (TA) to improve functional performance. (63341)   30 minutes Physical Performance Test  Unattended Electrical Stimulation (ES) for muscle performance and/or pain modulation. (31695)   Vasopneumatic Device Therapy () for management of swelling/edema. (74297)  BFR: Blood flow restriction applied during exercise  NP: Not Performed      Patient Education and Home Exercises     Home Exercises Provided and Patient Education Provided     Education provided:   - HEP, PT POC    Written Home Exercises Provided: yes. Exercises were reviewed and Smooth was able to demonstrate them prior to the end of the session.  Smooth demonstrated good  understanding of the education provided. See EMR under Patient Instructions for exercises provided during therapy sessions    ASSESSMENT     Biodex results reveal poor quadriceps strength on operative limb. Patient will need continued care to build strength.     Smooth Is progressing well towards her goals.   Pt prognosis is Excellent.     Pt  will continue to benefit from skilled outpatient physical therapy to address the deficits listed in the problem list box on initial evaluation, provide pt/family education and to maximize pt's level of independence in the home and community environment.     Pt's spiritual, cultural and educational needs considered and pt agreeable to plan of care and goals.     Anticipated barriers to physical therapy: None    Goals:  Short Term Goals:  6 weeks Status  Date Met   PAIN: Pt will report worst pain of 4/10 in order to progress toward max functional ability and improve quality of life. [] Progressing  [x] Met  [] Not Met     FUNCTION: Patient will demonstrate improved function as indicated by a functional score of greater than or equal to 5 out of 100 on FOTO. [x] Progressing  [] Met  [] Not Met     MOBILITY: Patient will improve AROM to 50% of stated goals, listed in objective measures above, in order to progress towards independence with functional activities.  [x] Progressing  [] Met  [] Not Met     STRENGTH: Patient will improve strength to 40% of stated goals, listed in objective measures above, in order to progress towards independence with functional activities. [x] Progressing  [] Met  [] Not Met     HEP: Patient will demonstrate independence with HEP in order to progress toward functional independence. [x] Progressing  [] Met  [] Not Met        Long Term Goals:  12 weeks Status Date Met   PAIN: Pt will report worst pain of 2/10 in order to progress toward max functional ability and improve quality of life [x] Progressing  [] Met  [] Not Met     FUNCTION: Patient will demonstrate improved function as indicated by a functional score of greater than or equal to 75 out of 100 on FOTO. [x] Progressing  [] Met  [] Not Met     MOBILITY: Patient will improve AROM to stated goals, listed in objective measures above, in order to return to maximal functional potential and improve quality of life. Goal: Full L knee ROM [x]  Progressing  [] Met  [] Not Met     STRENGTH: Patient will improve strength to stated goals, listed in objective measures above, in order to improve functional independence and quality of life. Goal: 75% symmetry on Biodex Testing for LLE [x] Progressing  [] Met  [] Not Met     GAIT: Patient will demonstrate normalized gait mechanics with minimal compensation in order to return to PLOF. [x] Progressing  [] Met  [] Not Met        Final Goals:  9 months post-op Status Date Met   PAIN: Pt will report worst pain of 1/10 in order to progress toward max functional ability and improve quality of life [x] Progressing  [] Met  [] Not Met     FUNCTION: Patient will demonstrate improved function as indicated by a functional score of greater than or equal to 90 out of 100 on FOTO. [x] Progressing  [] Met  [] Not Met     STRENGTH: Patient will improve strength to stated goals, listed in objective measures above, in order to improve functional independence and quality of life. Goal: 95% symmetry on Biodex Testing for LLE [x] Progressing  [] Met  [] Not Met     Patient will return to normal ADL's, IADL's, community involvement, recreational activities, and work-related activities including volleyball. [x] Progressing  [] Met  [] Not Met         PLAN   Continue per plan of care.  Progress as tolerated.    Iraj Huang, PT

## 2024-11-25 ENCOUNTER — OFFICE VISIT (OUTPATIENT)
Dept: SPORTS MEDICINE | Facility: CLINIC | Age: 24
End: 2024-11-25
Payer: COMMERCIAL

## 2024-11-25 ENCOUNTER — CLINICAL SUPPORT (OUTPATIENT)
Facility: HOSPITAL | Age: 24
End: 2024-11-25
Payer: COMMERCIAL

## 2024-11-25 DIAGNOSIS — M62.81 WEAKNESS OF LEFT QUADRICEPS MUSCLE: ICD-10-CM

## 2024-11-25 DIAGNOSIS — M25.662 DECREASED RANGE OF MOTION OF LEFT KNEE: Primary | ICD-10-CM

## 2024-11-25 DIAGNOSIS — M62.559 ATROPHY OF QUADRICEPS FEMORIS MUSCLE: ICD-10-CM

## 2024-11-25 DIAGNOSIS — Z98.890 S/P ARTHROSCOPIC RECONSTRUCTION OF ACL OF LEFT KNEE USING QUADRICEPS TENDON AUTOGRAFT: ICD-10-CM

## 2024-11-25 DIAGNOSIS — Z98.890 S/P ACL RECONSTRUCTION: Primary | ICD-10-CM

## 2024-11-25 DIAGNOSIS — M25.662 DECREASED RANGE OF MOTION OF LEFT KNEE: ICD-10-CM

## 2024-11-25 DIAGNOSIS — Z87.39 S/P ARTHROSCOPIC RECONSTRUCTION OF ACL OF LEFT KNEE USING QUADRICEPS TENDON AUTOGRAFT: ICD-10-CM

## 2024-11-25 PROCEDURE — 97110 THERAPEUTIC EXERCISES: CPT | Mod: PN | Performed by: PHYSICAL THERAPIST

## 2024-11-25 PROCEDURE — 99999 PR PBB SHADOW E&M-EST. PATIENT-LVL III: CPT | Mod: PBBFAC,,, | Performed by: ORTHOPAEDIC SURGERY

## 2024-11-25 PROCEDURE — 97112 NEUROMUSCULAR REEDUCATION: CPT | Mod: PN | Performed by: PHYSICAL THERAPIST

## 2024-11-25 PROCEDURE — 97140 MANUAL THERAPY 1/> REGIONS: CPT | Mod: PN | Performed by: PHYSICAL THERAPIST

## 2024-11-25 PROCEDURE — 97530 THERAPEUTIC ACTIVITIES: CPT | Mod: PN | Performed by: PHYSICAL THERAPIST

## 2024-11-25 NOTE — PROGRESS NOTES
Patient ID: Smooth Perez  YOB: 2000  MRN: [id number]    Chief Complaint: Post-op Evaluation of the Left Knee    Referred By: [facility]     History of Present Illness: Smooth Perez is a  24 y.o. 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 She presents FWB/WBAT: left lower extremity with no brace brace orassistive devices. The patient [facility], 2x a week. She will be heading back to [place] for school in the next few weeks. She reorts the pain has gotten better.  Overall there are no issues or concerns.         Past Medical History:   Past Medical History:   Diagnosis Date    Sickle cell trait      Past Surgical History:   Procedure Laterality Date    AMNION TRIAL, RECONSTRUCTION, KNEE, ACL, ARTHROSCOPIC Left 9/10/2024    Procedure: AMNION TRIAL, RECONSTRUCTION, KNEE, ACL, ARTHROSCOPIC;  Surgeon: Sarkis Meza MD;  Location: [facility];  Service: Orthopedics;  Laterality: Left;    LYSIS, ADHESIONS, KNEE, ARTHROSCOPIC Left 9/10/2024    Procedure: LYSIS, ADHESIONS, KNEE, ARTHROSCOPIC;  Surgeon: Sarkis Meza MD;  Location: [facility];  Service: Orthopedics;  Laterality: Left;     No family history on file.  Social History     Socioeconomic History    Marital status: Single   Tobacco Use    Smoking status: Never     Passive exposure: Never    Smokeless tobacco: Never   Substance and Sexual Activity    Alcohol use: Not Currently    Drug use: Not Currently     Types: Marijuana    Sexual activity: Not Currently     Partners: Male     Birth control/protection: I.U.D.     Medication List with Changes/Refills   Current Medications    DOCUSATE SODIUM (COLACE) 100 MG CAPSULE    Take 1 capsule (100 mg total)  by mouth 2 (two) times daily.    HYDROCODONE-ACETAMINOPHEN (NORCO) 5-325 MG PER TABLET    Take 1 tablet by mouth every 4 to 6 hours as needed for Pain.    LEVONORGESTREL (KYLEENA) 19.5 MG IUD    Kyleena    ONDANSETRON (ZOFRAN) 4 MG TABLET    Take 1 tablet (4 mg total) by mouth every 8 (eight) hours as needed for Nausea.    VALACYCLOVIR (VALTREX) 500 MG TABLET    Take 1 tablet by mouth every morning.     Review of patient's allergies indicates:  No Known Allergies  ROS    Physical Exam:   There is no height or weight on file to calculate BMI.  There were no vitals filed for this visit.   GENERAL: Well appearing, appropriate for stated age, no acute distress.  CARDIOVASCULAR: Pulses regular by peripheral palpation.  PULMONARY: Respirations are even and non-labored.  NEURO: Awake, alert, and oriented x 3.  PSYCH: Mood & affect are appropriate.  HEENT: Head is normocephalic and atraumatic.    Ortho/SPM Exam  Left Knee Exam  Sutures removed, incision sites clean dry and intact, no signs of infection  Minimal effusion   Knee ROM full extension to 90 degrees flexion, actually to about 115°  Fires [facility] well.  Grade 1A [place]achman  Stable to varus and valgus  Lacks a little hyperextension compared to the contralateral side  All compartments are soft and compressible. Calf soft non-tender. Intact EHL, FHL, gastrocsoleus, and tibialis anterior. Sensation intact to light touch in superficial peroneal, deep peroneal, tibial, sural, and saphenous nerve distributions. Foot warm and well perfused with capillary refill of less than 2 seconds and palpable pedal pulses.       Imaging:   XR Results:  Results for orders placed during the hospital encounter of 09/18/24    X-ray Knee Ortho Left with Flexion    Narrative  EXAMINATION:  XR KNEE ORTHO LEFT WITH FLEXION    CLINICAL HISTORY:  . Other acute postprocedural pain    TECHNIQUE:  AP standing view of both knees, PA flexion standing views of both knees, and Merchant views of both knees  were performed. A lateral view of the left knee was also performed.    COMPARISON:  08/21/2024    FINDINGS:  No acute osseous abnormality.  Left knee ACL repair postsurgical findings present.  Small left knee suprapatellar joint effusion suspected with mild prepatellar soft tissue edema.    Impression  As above      Electronically signed by: Zeke Berg MD  Date:    09/18/2024  Time:    16:27            Relevant imaging results reviewed and interpreted by me, discussed with the patient and / or family today.      Patient Instructions   Assessment:  Smooth Perez is a  24 y.o. 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    11 weeks status post left knee ACL reconstruction with partial thickness quad tendon autograft and lysis of adhesions on 9/10/24   Amnion Trial     Encounter Diagnoses   Name Primary?    S/P ACL reconstruction Yes    Atrophy of quadriceps femoris muscle     Weakness of left quadriceps muscle     Decreased range of motion of left knee         Plan:  Continue physical therapy with [facility]: ACL reconstruction protocol until patient leaves for school  Advise to work on achieving full knee extension  Will send PT referral to [facility] with copy of protocol to allow patient to continue post op course while back in [place] for school  Timing of Common ACL Milestones (Pain, Walking, etc)  Functional Progression after ACL Surgery (Running, etc)      Follow-up:3 months or sooner if there are any problems between now and then.    Leave Review:   Google: Leave Google Review  Healthgrades: Leave Healthgrades Review    After Hours Number: [phone]      Provider Note/Medical Decision Making:       I discussed worrisome and red flag signs and symptoms with the patient. The patient expressed understanding and agreed to alert me immediately or to go to the emergency room if they experience any of these.   Treatment plan was developed with  input from the patient/family, and they expressed understanding and agreement with the plan. All questions were answered today.                 Sarkis Meza MD  Orthopaedic Surgery & Sports Medicine     Disclaimer: This note was prepared using a voice recognition system and is likely to have sound alike errors within the text.

## 2024-11-25 NOTE — PATIENT INSTRUCTIONS
Assessment:  Smooth Perez is a  24 y.o. 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    11 weeks status post left knee ACL reconstruction with partial thickness quad tendon autograft and lysis of adhesions on 9/10/24   Amnion Trial     Encounter Diagnoses   Name Primary?    S/P ACL reconstruction Yes    Atrophy of quadriceps femoris muscle     Weakness of left quadriceps muscle     Decreased range of motion of left knee         Plan:  Continue physical therapy with [facility]: ACL reconstruction protocol until patient leaves for school  Advise to work on achieving full knee extension  Will send PT referral to [facility] with copy of protocol to allow patient to continue post op course while back in [place] for school  Timing of Common ACL Milestones (Pain, Walking, etc)  Functional Progression after ACL Surgery (Running, etc)      Follow-up:3 months or sooner if there are any problems between now and then.    Leave Review:   Google: Leave Google Review  Healthgrades: Leave Healthgrades Review    After Hours Number: [phone]

## 2024-11-25 NOTE — PROGRESS NOTES
[facility]   Physical Therapy Treatment Note     Name: Smooth Perez  Clinic Number: [id number]    Therapy Diagnosis:   Encounter Diagnoses   Name Primary?    Decreased range of motion of left knee Yes    Atrophy of quadriceps femoris muscle     Weakness of left quadriceps muscle     S/P arthroscopic reconstruction of ACL of left knee using quadriceps tendon autograft        Physician: Sarkis Meza MD    Visit Date: 11/25/2024    Physician Orders: PT Eval and Treat  Medical Diagnosis from Referral: Peripheral tear of medial meniscus of left knee as current injury, subsequent encounter [S83.222D], Rupture of anterior cruciate ligament of left knee, subsequent encounter [S83.512D]   Evaluation Date: 9/12/2024  Authorization Period Expiration: 12/31/24  Plan of Care Expiration: 12/31//2024  Progress Note Due: 12/21/24  Visit # / Visits authorized: 22/32 (1/1 eval)  FOTO: 1/3 (last performed on 9/12/2024)    Date of Surgery   9/10/24   Surgery Performed   AMNION TRIAL, RECONSTRUCTION, KNEE, ACL, ARTHROSCOPIC (Left)  LYSIS, ADHESIONS, KNEE, ARTHROSCOPIC (Left)      Post-Op Precautions   ACL recon protocol  Weight bearing: as tolerated  Range of motion: as tolerated  Antibiotics: 5 days of PO prophylactic antibiotics  DVT Ppx: Eliquis  PT/OT: Start POD#3  Follow-up: 10-14 days with AP/Lateral of operative knee (non-weightbearing)       Time In: 10:00 AM   Time Out: 11:15 AM   Total Billable Time: 75 minutes    SUBJECTIVE     Pt reports: she is doing well today. She has no new complaints.  She was compliant with home exercise program.  Response to previous treatment: no notable change  Functional change: no notable change    Pain: 1/10  Location: left knee    OBJECTIVE     10/17/24:  Able to reach full knee extension in CKC. Still unable to perform SLR without lag.     10/31:  Full knee extension range of motion in OKC and CKC. Flexion to 135 degrees. Very mild lag at terminal  extension.    11/19/24:  Able to achieve full knee extension in OKC and CKC with warm-up and/or overpressure. No lag with SLR at terminal extension. Flexion to 140 degrees.            11/25/24:  PROM:  L: -1-0-138  R:-6-0-150  AROM:  L: 0-2-130  R: -3-0-140  Hip Abduction Dynamometry:  L: 21.2  R:20.5  LSI: 103%  Hip ER Dynamometry (seated 90/90)  L: 21.3  R: 21.9  LSI: 97%      Treatment     Smooth received the treatments listed below:      Intervention Code  (see below chart) 11/13/24   Upright Bike TE 5 minutes   Walking Dynamics:  Hamstring Scoops  Quad Pulls TA 1 x 10 yards each   Manual Therapy  MT Patella mobs; knee PROM to improve flexion and extension   Single Leg Shuttle Squats NR 4 bands; 3x10; focus on reaching terminal extension at top   Split Squats NR 3x10   Step Up NR 3x10 - 12 inches   Step Down NR 3x10 - 6 inches   Tank Pull TE 5 x 20 yard laps; L3       15  minutes of Therapeutic Exercise (TE) to develop strength, endurance, ROM, and flexibility. (57009)  10 minutes of Manual therapy (MT) to improve pain and ROM. (09454)  25 minutes of Neuromuscular Re-Education (NR)  to improve: Balance, Coordination, Kinesthetic, Sense, Proprioception, and Posture. (75693)  10 minutes of Therapeutic Activities (TA) to improve functional performance. (58195)   0  minutes Physical Performance Test  Unattended Electrical Stimulation (ES) for muscle performance and/or pain modulation. (20766)   Vasopneumatic Device Therapy () for management of swelling/edema. (30444)  BFR: Blood flow restriction applied during exercise  NP: Not Performed      Patient Education and Home Exercises     Home Exercises Provided and Patient Education Provided     Education provided:   - HEP, PT POC    Written Home Exercises Provided: yes. Exercises were reviewed and Smooth was able to demonstrate them prior to the end of the session.  Smooth demonstrated good  understanding of the education provided. See EMR under Patient  Instructions for exercises provided during therapy sessions    ASSESSMENT     Patient needs continued work on knee extension range of motion and active control of terminal extension. Flexion progressing well. Operative quad remains weak compared to non-operative limb. She will benefit from continued care.    Smooth Is progressing well towards her goals.   Pt prognosis is Excellent.     Pt will continue to benefit from skilled outpatient physical therapy to address the deficits listed in the problem list box on initial evaluation, provide pt/family education and to maximize pt's level of independence in the home and community environment.     Pt's spiritual, cultural and educational needs considered and pt agreeable to plan of care and goals.     Anticipated barriers to physical therapy: None    Goals:  Short Term Goals:  6 weeks Status  Date Met   PAIN: Pt will report worst pain of 4/10 in order to progress toward max functional ability and improve quality of life. [] Progressing  [x] Met  [] Not Met     FUNCTION: Patient will demonstrate improved function as indicated by a functional score of greater than or equal to 5 out of 100 on FOTO. [x] Progressing  [] Met  [] Not Met     MOBILITY: Patient will improve AROM to 50% of stated goals, listed in objective measures above, in order to progress towards independence with functional activities.  [x] Progressing  [] Met  [] Not Met     STRENGTH: Patient will improve strength to 40% of stated goals, listed in objective measures above, in order to progress towards independence with functional activities. [x] Progressing  [] Met  [] Not Met     HEP: Patient will demonstrate independence with HEP in order to progress toward functional independence. [x] Progressing  [] Met  [] Not Met        Long Term Goals:  12 weeks Status Date Met   PAIN: Pt will report worst pain of 2/10 in order to progress toward max functional ability and improve quality of life [x] Progressing  []  Met  [] Not Met     FUNCTION: Patient will demonstrate improved function as indicated by a functional score of greater than or equal to 75 out of 100 on FOTO. [x] Progressing  [] Met  [] Not Met     MOBILITY: Patient will improve AROM to stated goals, listed in objective measures above, in order to return to maximal functional potential and improve quality of life. Goal: Full L knee ROM [x] Progressing  [] Met  [] Not Met     STRENGTH: Patient will improve strength to stated goals, listed in objective measures above, in order to improve functional independence and quality of life. Goal: 75% symmetry on Biodex Testing for LLE [x] Progressing  [] Met  [] Not Met     GAIT: Patient will demonstrate normalized gait mechanics with minimal compensation in order to return to PLOF. [x] Progressing  [] Met  [] Not Met        Final Goals:  9 months post-op Status Date Met   PAIN: Pt will report worst pain of 1/10 in order to progress toward max functional ability and improve quality of life [x] Progressing  [] Met  [] Not Met     FUNCTION: Patient will demonstrate improved function as indicated by a functional score of greater than or equal to 90 out of 100 on FOTO. [x] Progressing  [] Met  [] Not Met     STRENGTH: Patient will improve strength to stated goals, listed in objective measures above, in order to improve functional independence and quality of life. Goal: 95% symmetry on Biodex Testing for LLE [x] Progressing  [] Met  [] Not Met     Patient will return to normal ADL's, IADL's, community involvement, recreational activities, and work-related activities including volleyball. [x] Progressing  [] Met  [] Not Met         PLAN   Continue per plan of care.  Progress as tolerated.    Irja Huang, PT

## 2024-11-27 ENCOUNTER — PATIENT MESSAGE (OUTPATIENT)
Dept: RESEARCH | Facility: HOSPITAL | Age: 24
End: 2024-11-27
Payer: COMMERCIAL

## 2024-12-03 ENCOUNTER — PROCEDURE VISIT (OUTPATIENT)
Dept: SPORTS MEDICINE | Facility: CLINIC | Age: 24
End: 2024-12-03
Payer: COMMERCIAL

## 2024-12-03 ENCOUNTER — CLINICAL SUPPORT (OUTPATIENT)
Facility: HOSPITAL | Age: 24
End: 2024-12-03
Payer: COMMERCIAL

## 2024-12-03 VITALS — BODY MASS INDEX: 23 KG/M2 | WEIGHT: 125 LBS | HEIGHT: 62 IN

## 2024-12-03 DIAGNOSIS — Z87.39 S/P ARTHROSCOPIC RECONSTRUCTION OF ACL OF LEFT KNEE USING QUADRICEPS TENDON AUTOGRAFT: ICD-10-CM

## 2024-12-03 DIAGNOSIS — Z98.890 S/P ACL RECONSTRUCTION: Primary | ICD-10-CM

## 2024-12-03 DIAGNOSIS — M62.559 ATROPHY OF QUADRICEPS FEMORIS MUSCLE: ICD-10-CM

## 2024-12-03 DIAGNOSIS — M62.81 WEAKNESS OF LEFT QUADRICEPS MUSCLE: ICD-10-CM

## 2024-12-03 DIAGNOSIS — Z98.890 S/P ARTHROSCOPIC RECONSTRUCTION OF ACL OF LEFT KNEE USING QUADRICEPS TENDON AUTOGRAFT: ICD-10-CM

## 2024-12-03 DIAGNOSIS — M25.662 DECREASED RANGE OF MOTION OF LEFT KNEE: Primary | ICD-10-CM

## 2024-12-03 PROCEDURE — 97110 THERAPEUTIC EXERCISES: CPT | Mod: PN | Performed by: PHYSICAL THERAPIST

## 2024-12-03 PROCEDURE — 97112 NEUROMUSCULAR REEDUCATION: CPT | Mod: PN | Performed by: PHYSICAL THERAPIST

## 2024-12-03 PROCEDURE — 97530 THERAPEUTIC ACTIVITIES: CPT | Mod: PN | Performed by: PHYSICAL THERAPIST

## 2024-12-03 PROCEDURE — 97140 MANUAL THERAPY 1/> REGIONS: CPT | Mod: PN | Performed by: PHYSICAL THERAPIST

## 2024-12-03 NOTE — PROGRESS NOTES
[facility]   Physical Therapy Treatment Note     Name: Smooth Perez  Clinic Number: [id number]    Therapy Diagnosis:   Encounter Diagnoses   Name Primary?    Decreased range of motion of left knee Yes    Atrophy of quadriceps femoris muscle     Weakness of left quadriceps muscle     S/P arthroscopic reconstruction of ACL of left knee using quadriceps tendon autograft        Physician: Sarkis Meza MD    Visit Date: 12/3/2024    Physician Orders: PT Eval and Treat  Medical Diagnosis from Referral: Peripheral tear of medial meniscus of left knee as current injury, subsequent encounter [S83.222D], Rupture of anterior cruciate ligament of left knee, subsequent encounter [S83.512D]   Evaluation Date: 9/12/2024  Authorization Period Expiration: 12/31/24  Plan of Care Expiration: 12/31//2024  Progress Note Due: 12/21/24  Visit # / Visits authorized: 23/32 (1/1 eval)  FOTO: 1/3 (last performed on 9/12/2024)    Date of Surgery   9/10/24   Surgery Performed   AMNION TRIAL, RECONSTRUCTION, KNEE, ACL, ARTHROSCOPIC (Left)  LYSIS, ADHESIONS, KNEE, ARTHROSCOPIC (Left)      Post-Op Precautions   ACL recon protocol  Weight bearing: as tolerated  Range of motion: as tolerated  Antibiotics: 5 days of PO prophylactic antibiotics  DVT Ppx: Eliquis  PT/OT: Start POD#3  Follow-up: 10-14 days with AP/Lateral of operative knee (non-weightbearing)       Time In: 10:15 AM   Time Out: 11:30 AM   Total Billable Time: 75 minutes    SUBJECTIVE     Pt reports: she is doing well today. She has no new complaints.  She was compliant with home exercise program.  Response to previous treatment: no notable change  Functional change: no notable change    Pain: 1/10  Location: left knee    OBJECTIVE     10/17/24:  Able to reach full knee extension in CKC. Still unable to perform SLR without lag.     10/31:  Full knee extension range of motion in OKC and CKC. Flexion to 135 degrees. Very mild lag at terminal  extension.    11/19/24:  Able to achieve full knee extension in OKC and CKC with warm-up and/or overpressure. No lag with SLR at terminal extension. Flexion to 140 degrees.            11/25/24:  PROM:  L: -1-0-138  R:-6-0-150  AROM:  L: 0-2-130  R: -3-0-140  Hip Abduction Dynamometry:  L: 21.2  R:20.5  LSI: 103%  Hip ER Dynamometry (seated 90/90)  L: 21.3  R: 21.9  LSI: 97%      Treatment     Smooth received the treatments listed below:      Intervention Code  (see below chart) 12/3/24   Upright Bike TE 5 minutes   [employer]:  Hamstring Scoops  Quad Pulls TA 1 x 10 yards each   Manual Therapy  MT Patella mobs; knee PROM to improve flexion and extension   Single Leg Shuttle Squats NR 4 bands; 3x10; focus on reaching terminal extension at top   Split Squats NR 3x10   Step Up NR 3x10 - 12 inches   Step Down NR 3x10 - 6 inches   BFR: Ancore LAQ TE BFR Set/Rep scheme - 5#   Tank Pull TE 5 x 20 yard laps; L3       15  minutes of Therapeutic Exercise (TE) to develop strength, endurance, ROM, and flexibility. (40673)  10 minutes of Manual therapy (MT) to improve pain and ROM. (13086)  25 minutes of Neuromuscular Re-Education (NR)  to improve: Balance, Coordination, Kinesthetic, Sense, Proprioception, and Posture. (07689)  10 minutes of Therapeutic Activities (TA) to improve functional performance. (05281)    0  minutes Physical Performance Test  Unattended Electrical Stimulation (ES) for muscle performance and/or pain modulation. (52185)   Vasopneumatic Device Therapy () for management of swelling/edema. (01041)  BFR: Blood flow restriction applied during exercise  NP: Not Performed      Patient Education and Home Exercises     Home Exercises Provided and Patient Education Provided     Education provided:   - HEP, PT POC    Written Home Exercises Provided: yes. Exercises were reviewed and Smooth was able to demonstrate them prior to the end of the session.  Smooth demonstrated good  understanding of the  education provided. See EMR under Patient Instructions for exercises provided during therapy sessions    ASSESSMENT     Patient tolerated today's treatment well. Good progression with CKC activity. She will benefit from continued physical therapy care.    Smooth Is progressing well towards her goals.   Pt prognosis is Excellent.     Pt will continue to benefit from skilled outpatient physical therapy to address the deficits listed in the problem list box on initial evaluation, provide pt/family education and to maximize pt's level of independence in the home and community environment.     Pt's spiritual, cultural and educational needs considered and pt agreeable to plan of care and goals.     Anticipated barriers to physical therapy: None    Goals:  Short Term Goals:  6 weeks Status  Date Met   PAIN: Pt will report worst pain of 4/10 in order to progress toward max functional ability and improve quality of life. [] Progressing  [x] Met  [] Not Met     FUNCTION: Patient will demonstrate improved function as indicated by a functional score of greater than or equal to 5 out of 100 on FOTO. [x] Progressing  [] Met  [] Not Met     MOBILITY: Patient will improve AROM to 50% of stated goals, listed in objective measures above, in order to progress towards independence with functional activities.  [x] Progressing  [] Met  [] Not Met     STRENGTH: Patient will improve strength to 40% of stated goals, listed in objective measures above, in order to progress towards independence with functional activities. [x] Progressing  [] Met  [] Not Met     HEP: Patient will demonstrate independence with HEP in order to progress toward functional independence. [x] Progressing  [] Met  [] Not Met        Long Term Goals:  12 weeks Status Date Met   PAIN: Pt will report worst pain of 2/10 in order to progress toward max functional ability and improve quality of life [x] Progressing  [] Met  [] Not Met     FUNCTION: Patient will demonstrate  improved function as indicated by a functional score of greater than or equal to 75 out of 100 on FOTO. [x] Progressing  [] Met  [] Not Met     MOBILITY: Patient will improve AROM to stated goals, listed in objective measures above, in order to return to maximal functional potential and improve quality of life. Goal: Full L knee ROM [x] Progressing  [] Met  [] Not Met     STRENGTH: Patient will improve strength to stated goals, listed in objective measures above, in order to improve functional independence and quality of life. Goal: 75% symmetry on Biodex Testing for LLE [x] Progressing  [] Met  [] Not Met     GAIT: Patient will demonstrate normalized gait mechanics with minimal compensation in order to return to PLOF. [x] Progressing  [] Met  [] Not Met        Final Goals:  9 months post-op Status Date Met   PAIN: Pt will report worst pain of 1/10 in order to progress toward max functional ability and improve quality of life [x] Progressing  [] Met  [] Not Met     FUNCTION: Patient will demonstrate improved function as indicated by a functional score of greater than or equal to 90 out of 100 on FOTO. [x] Progressing  [] Met  [] Not Met     STRENGTH: Patient will improve strength to stated goals, listed in objective measures above, in order to improve functional independence and quality of life. Goal: 95% symmetry on Biodex Testing for LLE [x] Progressing  [] Met  [] Not Met     Patient will return to normal ADL's, IADL's, community involvement, recreational activities, and work-related activities including volleyball. [x] Progressing  [] Met  [] Not Met         PLAN   Continue per plan of care.  Progress as tolerated.    Iraj Huang, PT, DPT

## 2024-12-03 NOTE — PROCEDURES
FOCUSED:  1. Diagnostic Extremity - [facility]-Sports Ultrasound was recommended due to left quadriceps tendon evaluation.  2. Diagnostic Extremity - [facility]-Sports Ultrasound Performed: Mak Marino Extremity Study: left quadriceps tendon.    TECHNIQUE: Real time ultrasound examination of the left quadriceps tendon was performed with SonFireDrillMete Edge 2, 9-L MHz linear probe(s).     FINDINGS: The images are of adequate diagnostic quality with identification of all echogenic structures made except for the vascular structures unless otherwise noted. There is no sonographic evidence of periosteal abnormalities, soft tissue edema, musculotendinous or nerve irregularities.  The left quadriceps tendon was visualized in long and short axis.  Thickness was evaluated at 0.89 cm with a width of 3.05 cm.  There is significant hypoechoic changes centrally in the tendon with overriding hyperemia throughout the tendon itself as well. The rest of the sonographic examination was unremarkable.  Ultrasound images were directly reviewed with the patient and then a treatment plan was discussed.  The images were saved and stored for documentation in the EMR.     IMPRESSION:  Left quadriceps tendinosis with overriding hyperemia with postsurgical changes

## 2024-12-05 ENCOUNTER — CLINICAL SUPPORT (OUTPATIENT)
Facility: HOSPITAL | Age: 24
End: 2024-12-05
Payer: COMMERCIAL

## 2024-12-05 DIAGNOSIS — M62.81 WEAKNESS OF LEFT QUADRICEPS MUSCLE: ICD-10-CM

## 2024-12-05 DIAGNOSIS — M62.559 ATROPHY OF QUADRICEPS FEMORIS MUSCLE: ICD-10-CM

## 2024-12-05 DIAGNOSIS — Z98.890 S/P ARTHROSCOPIC RECONSTRUCTION OF ACL OF LEFT KNEE USING QUADRICEPS TENDON AUTOGRAFT: ICD-10-CM

## 2024-12-05 DIAGNOSIS — Z87.39 S/P ARTHROSCOPIC RECONSTRUCTION OF ACL OF LEFT KNEE USING QUADRICEPS TENDON AUTOGRAFT: ICD-10-CM

## 2024-12-05 DIAGNOSIS — M25.662 DECREASED RANGE OF MOTION OF LEFT KNEE: Primary | ICD-10-CM

## 2024-12-05 PROCEDURE — 97140 MANUAL THERAPY 1/> REGIONS: CPT | Mod: PN | Performed by: PHYSICAL THERAPIST

## 2024-12-05 PROCEDURE — 97110 THERAPEUTIC EXERCISES: CPT | Mod: PN | Performed by: PHYSICAL THERAPIST

## 2024-12-05 PROCEDURE — 97112 NEUROMUSCULAR REEDUCATION: CPT | Mod: PN | Performed by: PHYSICAL THERAPIST

## 2024-12-05 PROCEDURE — 97530 THERAPEUTIC ACTIVITIES: CPT | Mod: PN | Performed by: PHYSICAL THERAPIST

## 2024-12-06 DIAGNOSIS — M62.81 WEAKNESS OF LEFT QUADRICEPS MUSCLE: ICD-10-CM

## 2024-12-06 DIAGNOSIS — Z98.890 S/P ACL RECONSTRUCTION: Primary | ICD-10-CM

## 2024-12-06 DIAGNOSIS — S83.512D RUPTURE OF ANTERIOR CRUCIATE LIGAMENT OF LEFT KNEE, SUBSEQUENT ENCOUNTER: ICD-10-CM

## 2024-12-10 ENCOUNTER — CLINICAL SUPPORT (OUTPATIENT)
Facility: HOSPITAL | Age: 24
End: 2024-12-10
Payer: COMMERCIAL

## 2024-12-10 DIAGNOSIS — Z87.39 S/P ARTHROSCOPIC RECONSTRUCTION OF ACL OF LEFT KNEE USING QUADRICEPS TENDON AUTOGRAFT: ICD-10-CM

## 2024-12-10 DIAGNOSIS — M62.81 WEAKNESS OF LEFT QUADRICEPS MUSCLE: ICD-10-CM

## 2024-12-10 DIAGNOSIS — Z98.890 S/P ARTHROSCOPIC RECONSTRUCTION OF ACL OF LEFT KNEE USING QUADRICEPS TENDON AUTOGRAFT: ICD-10-CM

## 2024-12-10 DIAGNOSIS — M25.662 DECREASED RANGE OF MOTION OF LEFT KNEE: Primary | ICD-10-CM

## 2024-12-10 DIAGNOSIS — M62.559 ATROPHY OF QUADRICEPS FEMORIS MUSCLE: ICD-10-CM

## 2024-12-10 PROCEDURE — 97140 MANUAL THERAPY 1/> REGIONS: CPT | Mod: PN | Performed by: PHYSICAL THERAPIST

## 2024-12-10 PROCEDURE — 97110 THERAPEUTIC EXERCISES: CPT | Mod: PN | Performed by: PHYSICAL THERAPIST

## 2024-12-10 PROCEDURE — 97530 THERAPEUTIC ACTIVITIES: CPT | Mod: PN | Performed by: PHYSICAL THERAPIST

## 2024-12-10 PROCEDURE — 97112 NEUROMUSCULAR REEDUCATION: CPT | Mod: PN | Performed by: PHYSICAL THERAPIST

## 2024-12-10 NOTE — PROGRESS NOTES
[facility]   Physical Therapy Treatment Note     Name: Smooth Perez  Clinic Number: [id number]    Therapy Diagnosis:   Encounter Diagnoses   Name Primary?    Decreased range of motion of left knee Yes    Atrophy of quadriceps femoris muscle     Weakness of left quadriceps muscle     S/P arthroscopic reconstruction of ACL of left knee using quadriceps tendon autograft        Physician: Sarkis Meza MD    Visit Date: 12/10/2024    Physician Orders: PT Eval and Treat  Medical Diagnosis from Referral: Peripheral tear of medial meniscus of left knee as current injury, subsequent encounter [S83.222D], Rupture of anterior cruciate ligament of left knee, subsequent encounter [S83.512D]   Evaluation Date: 9/12/2024  Authorization Period Expiration: 12/31/24  Plan of Care Expiration: 12/31//2024  Progress Note Due: 12/21/24  Visit # / Visits authorized: 25/32 (1/1 eval)  FOTO: 1/3 (last performed on 9/12/2024)    Date of Surgery   9/10/24   Surgery Performed   AMNION TRIAL, RECONSTRUCTION, KNEE, ACL, ARTHROSCOPIC (Left)  LYSIS, ADHESIONS, KNEE, ARTHROSCOPIC (Left)      Post-Op Precautions   ACL recon protocol  Weight bearing: as tolerated  Range of motion: as tolerated  Antibiotics: 5 days of PO prophylactic antibiotics  DVT Ppx: Eliquis  PT/OT: Start POD#3  Follow-up: 10-14 days with AP/Lateral of operative knee (non-weightbearing)       Time In: 11:15 AM   Time Out: 12:15 PM  Total Billable Time: 60 minutes    SUBJECTIVE     Pt reports:  she has no new complaints today.  She was compliant with home exercise program.  Response to previous treatment: no notable change  Functional change: no notable change    Pain: 1/10  Location: left knee    OBJECTIVE     10/17/24:  Able to reach full knee extension in CKC. Still unable to perform SLR without lag.     10/31:  Full knee extension range of motion in OKC and CKC. Flexion to 135 degrees. Very mild lag at terminal  extension.    11/19/24:  Able to achieve full knee extension in OKC and CKC with warm-up and/or overpressure. No lag with SLR at terminal extension. Flexion to 140 degrees.            11/25/24:  PROM:  L: -1-0-138  R:-6-0-150  AROM:  L: 0-2-130  R: -3-0-140  Hip Abduction Dynamometry:  L: 21.2  R:20.5  LSI: 103%  Hip ER Dynamometry (seated 90/90)  L: 21.3  R: 21.9  LSI: 97%      Treatment     Smooth received the treatments listed below:      Intervention Code  (see below chart) 12/10/24   Upright Bike TE 5 minutes   [employer]:  Hamstring Scoops  Quad Pulls TA 1 x 10 yards each   Manual Therapy  MT Patella mobs; knee PROM to improve flexion and extension   Single Leg Shuttle Squats NR 4 bands; 3x10; focus on reaching terminal extension at top   Split Squats NR 3x10   Step Up NR 3x10 - 12 inches   Step Down NR 3x10 - 6 inches   BFR: Ancore LAQ TE NP   Tank Pull TE 5 x 20 yard laps; L3       10  minutes of Therapeutic Exercise (TE) to develop strength, endurance, ROM, and flexibility. (88174)  10 minutes of Manual therapy (MT) to improve pain and ROM. (65363)  25 minutes of Neuromuscular Re-Education (NR)  to improve: Balance, Coordination, Kinesthetic, Sense, Proprioception, and Posture. (04113)  10 minutes of Therapeutic Activities (TA) to improve functional performance. (72667)    0  minutes Physical Performance Test  Unattended Electrical Stimulation (ES) for muscle performance and/or pain modulation. (37141)   Vasopneumatic Device Therapy () for management of swelling/edema. (25949)  BFR: Blood flow restriction applied during exercise  NP: Not Performed      Patient Education and Home Exercises     Home Exercises Provided and Patient Education Provided     Education provided:   - HEP, PT POC    Written Home Exercises Provided: yes. Exercises were reviewed and Smooth was able to demonstrate them prior to the end of the session.  Smooth demonstrated good  understanding of the education provided. See EMR  under Patient Instructions for exercises provided during therapy sessions    ASSESSMENT     Continue to focus on terminal knee extension strength and neuromuscular control. She continues to make good progress with her strength.    Smooth Is progressing well towards her goals.   Pt prognosis is Excellent.     Pt will continue to benefit from skilled outpatient physical therapy to address the deficits listed in the problem list box on initial evaluation, provide pt/family education and to maximize pt's level of independence in the home and community environment.     Pt's spiritual, cultural and educational needs considered and pt agreeable to plan of care and goals.     Anticipated barriers to physical therapy: None    Goals:  Short Term Goals:  6 weeks Status  Date Met   PAIN: Pt will report worst pain of 4/10 in order to progress toward max functional ability and improve quality of life. [] Progressing  [x] Met  [] Not Met     FUNCTION: Patient will demonstrate improved function as indicated by a functional score of greater than or equal to 5 out of 100 on FOTO. [x] Progressing  [] Met  [] Not Met     MOBILITY: Patient will improve AROM to 50% of stated goals, listed in objective measures above, in order to progress towards independence with functional activities.  [x] Progressing  [] Met  [] Not Met     STRENGTH: Patient will improve strength to 40% of stated goals, listed in objective measures above, in order to progress towards independence with functional activities. [x] Progressing  [] Met  [] Not Met     HEP: Patient will demonstrate independence with HEP in order to progress toward functional independence. [x] Progressing  [] Met  [] Not Met        Long Term Goals:  12 weeks Status Date Met   PAIN: Pt will report worst pain of 2/10 in order to progress toward max functional ability and improve quality of life [x] Progressing  [] Met  [] Not Met     FUNCTION: Patient will demonstrate improved function as  indicated by a functional score of greater than or equal to 75 out of 100 on FOTO. [x] Progressing  [] Met  [] Not Met     MOBILITY: Patient will improve AROM to stated goals, listed in objective measures above, in order to return to maximal functional potential and improve quality of life. Goal: Full L knee ROM [x] Progressing  [] Met  [] Not Met     STRENGTH: Patient will improve strength to stated goals, listed in objective measures above, in order to improve functional independence and quality of life. Goal: 75% symmetry on Biodex Testing for LLE [x] Progressing  [] Met  [] Not Met     GAIT: Patient will demonstrate normalized gait mechanics with minimal compensation in order to return to PLOF. [x] Progressing  [] Met  [] Not Met        Final Goals:  9 months post-op Status Date Met   PAIN: Pt will report worst pain of 1/10 in order to progress toward max functional ability and improve quality of life [x] Progressing  [] Met  [] Not Met     FUNCTION: Patient will demonstrate improved function as indicated by a functional score of greater than or equal to 90 out of 100 on FOTO. [x] Progressing  [] Met  [] Not Met     STRENGTH: Patient will improve strength to stated goals, listed in objective measures above, in order to improve functional independence and quality of life. Goal: 95% symmetry on Biodex Testing for LLE [x] Progressing  [] Met  [] Not Met     Patient will return to normal ADL's, IADL's, community involvement, recreational activities, and work-related activities including volleyball. [x] Progressing  [] Met  [] Not Met         PLAN   Continue per plan of care.  Progress as tolerated.    Iraj Huang, PT, DPT

## 2024-12-12 ENCOUNTER — CLINICAL SUPPORT (OUTPATIENT)
Facility: HOSPITAL | Age: 24
End: 2024-12-12
Payer: COMMERCIAL

## 2024-12-12 DIAGNOSIS — Z87.39 S/P ARTHROSCOPIC RECONSTRUCTION OF ACL OF LEFT KNEE USING QUADRICEPS TENDON AUTOGRAFT: ICD-10-CM

## 2024-12-12 DIAGNOSIS — Z98.890 S/P ARTHROSCOPIC RECONSTRUCTION OF ACL OF LEFT KNEE USING QUADRICEPS TENDON AUTOGRAFT: ICD-10-CM

## 2024-12-12 DIAGNOSIS — M25.662 DECREASED RANGE OF MOTION OF LEFT KNEE: Primary | ICD-10-CM

## 2024-12-12 DIAGNOSIS — M62.559 ATROPHY OF QUADRICEPS FEMORIS MUSCLE: ICD-10-CM

## 2024-12-12 DIAGNOSIS — Z98.890 S/P ACL RECONSTRUCTION: Primary | ICD-10-CM

## 2024-12-12 DIAGNOSIS — M62.81 WEAKNESS OF LEFT QUADRICEPS MUSCLE: ICD-10-CM

## 2024-12-12 PROCEDURE — 97112 NEUROMUSCULAR REEDUCATION: CPT | Mod: PN | Performed by: PHYSICAL THERAPIST

## 2024-12-12 PROCEDURE — 97110 THERAPEUTIC EXERCISES: CPT | Mod: PN | Performed by: PHYSICAL THERAPIST

## 2024-12-12 PROCEDURE — 97530 THERAPEUTIC ACTIVITIES: CPT | Mod: PN | Performed by: PHYSICAL THERAPIST

## 2024-12-12 PROCEDURE — 97140 MANUAL THERAPY 1/> REGIONS: CPT | Mod: PN | Performed by: PHYSICAL THERAPIST

## 2024-12-13 NOTE — PROGRESS NOTES
[facility]   Physical Therapy Treatment Note     Name: Smooth Perez  Clinic Number: [id number]    Therapy Diagnosis:   Encounter Diagnoses   Name Primary?    Decreased range of motion of left knee Yes    Atrophy of quadriceps femoris muscle     Weakness of left quadriceps muscle     S/P arthroscopic reconstruction of ACL of left knee using quadriceps tendon autograft        Physician: Sarkis Meza MD    Visit Date: 12/12/2024    Physician Orders: PT Eval and Treat  Medical Diagnosis from Referral: Peripheral tear of medial meniscus of left knee as current injury, subsequent encounter [S83.222D], Rupture of anterior cruciate ligament of left knee, subsequent encounter [S83.512D]   Evaluation Date: 9/12/2024  Authorization Period Expiration: 12/31/24  Plan of Care Expiration: 12/31//2024  Progress Note Due: 12/21/24  Visit # / Visits authorized: 26/32 (1/1 eval)  FOTO: 1/3 (last performed on 9/12/2024)    Date of Surgery   9/10/24   Surgery Performed   AMNION TRIAL, RECONSTRUCTION, KNEE, ACL, ARTHROSCOPIC (Left)  LYSIS, ADHESIONS, KNEE, ARTHROSCOPIC (Left)      Post-Op Precautions   ACL recon protocol  Weight bearing: as tolerated  Range of motion: as tolerated  Antibiotics: 5 days of PO prophylactic antibiotics  DVT Ppx: Eliquis  PT/OT: Start POD#3  Follow-up: 10-14 days with AP/Lateral of operative knee (non-weightbearing)       Time In: 1:00 PM   Time Out: 2:00 PM  Total Billable Time: 60 minutes    SUBJECTIVE     Pt reports:  she is doing well today. She has no new complaints. She leaves for [place] next week.  She was compliant with home exercise program.  Response to previous treatment: no notable change  Functional change: no notable change    Pain: 1/10  Location: left knee    OBJECTIVE             11/25/24:  PROM:  L: -1-0-138  R:-6-0-150  AROM:  L: 0-2-130  R: -3-0-140  Hip Abduction Dynamometry:  L: 21.2  R:20.5  LSI: 103%  Hip ER Dynamometry (seated 90/90)  L:  21.3  R: 21.9  LSI: 97%      Treatment     Smooth received the treatments listed below:      Intervention Code  (see below chart) 12/13/24   Upright Bike TE 5 minutes   [employer]:  Hamstring Scoops  Quad Pulls TA 1 x 10 yards each   Manual Therapy  MT Patella mobs; knee PROM to improve flexion and extension   Single Leg Shuttle Squats NR 4 bands; 3x10; focus on reaching terminal extension at top   Split Squats NR 3x10   Step Up NR 3x10 - 12 inches   Step Down NR 3x10 - 6 inches   BFR: Ancore LAQ TE NP   Tank Pull TE 5 x 20 yard laps; L3       10  minutes of Therapeutic Exercise (TE) to develop strength, endurance, ROM, and flexibility. (23054)  10 minutes of Manual therapy (MT) to improve pain and ROM. (80878)  25 minutes of Neuromuscular Re-Education (NR)  to improve: Balance, Coordination, Kinesthetic, Sense, Proprioception, and Posture. (93989)  10 minutes of Therapeutic Activities (TA) to improve functional performance. (86168)    0  minutes Physical Performance Test  Unattended Electrical Stimulation (ES) for muscle performance and/or pain modulation. (84160)   Vasopneumatic Device Therapy () for management of swelling/edema. (24584)  BFR: Blood flow restriction applied during exercise  NP: Not Performed      Patient Education and Home Exercises     Home Exercises Provided and Patient Education Provided     Education provided:   - HEP, PT POC    Written Home Exercises Provided: yes. Exercises were reviewed and Smooth was able to demonstrate them prior to the end of the session.  Smooth demonstrated good  understanding of the education provided. See EMR under Patient Instructions for exercises provided during therapy sessions    ASSESSMENT     Patient tolerated today's treatment well. She demonstrates continued improvement in her strength. She will benefit from continued care.    Smooth Is progressing well towards her goals.   Pt prognosis is Excellent.     Pt will continue to benefit from skilled  outpatient physical therapy to address the deficits listed in the problem list box on initial evaluation, provide pt/family education and to maximize pt's level of independence in the home and community environment.     Pt's spiritual, cultural and educational needs considered and pt agreeable to plan of care and goals.     Anticipated barriers to physical therapy: None    Goals:  Short Term Goals:  6 weeks Status  Date Met   PAIN: Pt will report worst pain of 4/10 in order to progress toward max functional ability and improve quality of life. [] Progressing  [x] Met  [] Not Met     FUNCTION: Patient will demonstrate improved function as indicated by a functional score of greater than or equal to 5 out of 100 on FOTO. [x] Progressing  [] Met  [] Not Met     MOBILITY: Patient will improve AROM to 50% of stated goals, listed in objective measures above, in order to progress towards independence with functional activities.  [x] Progressing  [] Met  [] Not Met     STRENGTH: Patient will improve strength to 40% of stated goals, listed in objective measures above, in order to progress towards independence with functional activities. [x] Progressing  [] Met  [] Not Met     HEP: Patient will demonstrate independence with HEP in order to progress toward functional independence. [x] Progressing  [] Met  [] Not Met        Long Term Goals:  12 weeks Status Date Met   PAIN: Pt will report worst pain of 2/10 in order to progress toward max functional ability and improve quality of life [x] Progressing  [] Met  [] Not Met     FUNCTION: Patient will demonstrate improved function as indicated by a functional score of greater than or equal to 75 out of 100 on FOTO. [x] Progressing  [] Met  [] Not Met     MOBILITY: Patient will improve AROM to stated goals, listed in objective measures above, in order to return to maximal functional potential and improve quality of life. Goal: Full L knee ROM [x] Progressing  [] Met  [] Not Met      STRENGTH: Patient will improve strength to stated goals, listed in objective measures above, in order to improve functional independence and quality of life. Goal: 75% symmetry on Biodex Testing for LLE [x] Progressing  [] Met  [] Not Met     GAIT: Patient will demonstrate normalized gait mechanics with minimal compensation in order to return to PLOF. [x] Progressing  [] Met  [] Not Met        Final Goals:  9 months post-op Status Date Met   PAIN: Pt will report worst pain of 1/10 in order to progress toward max functional ability and improve quality of life [x] Progressing  [] Met  [] Not Met     FUNCTION: Patient will demonstrate improved function as indicated by a functional score of greater than or equal to 90 out of 100 on FOTO. [x] Progressing  [] Met  [] Not Met     STRENGTH: Patient will improve strength to stated goals, listed in objective measures above, in order to improve functional independence and quality of life. Goal: 95% symmetry on Biodex Testing for LLE [x] Progressing  [] Met  [] Not Met     Patient will return to normal ADL's, IADL's, community involvement, recreational activities, and work-related activities including volleyball. [x] Progressing  [] Met  [] Not Met         PLAN   Continue per plan of care.  Progress as tolerated.    Iraj Huang, PT, DPT

## 2024-12-17 ENCOUNTER — CLINICAL SUPPORT (OUTPATIENT)
Facility: HOSPITAL | Age: 24
End: 2024-12-17
Payer: COMMERCIAL

## 2024-12-17 DIAGNOSIS — Z87.39 S/P ARTHROSCOPIC RECONSTRUCTION OF ACL OF LEFT KNEE USING QUADRICEPS TENDON AUTOGRAFT: ICD-10-CM

## 2024-12-17 DIAGNOSIS — M62.81 WEAKNESS OF LEFT QUADRICEPS MUSCLE: ICD-10-CM

## 2024-12-17 DIAGNOSIS — M62.559 ATROPHY OF QUADRICEPS FEMORIS MUSCLE: ICD-10-CM

## 2024-12-17 DIAGNOSIS — Z98.890 S/P ARTHROSCOPIC RECONSTRUCTION OF ACL OF LEFT KNEE USING QUADRICEPS TENDON AUTOGRAFT: ICD-10-CM

## 2024-12-17 DIAGNOSIS — M25.662 DECREASED RANGE OF MOTION OF LEFT KNEE: Primary | ICD-10-CM

## 2024-12-17 PROCEDURE — 97750 PHYSICAL PERFORMANCE TEST: CPT | Mod: PN | Performed by: PHYSICAL THERAPIST

## 2024-12-17 PROCEDURE — 97140 MANUAL THERAPY 1/> REGIONS: CPT | Mod: PN | Performed by: PHYSICAL THERAPIST

## 2024-12-17 PROCEDURE — 97530 THERAPEUTIC ACTIVITIES: CPT | Mod: PN | Performed by: PHYSICAL THERAPIST

## 2024-12-17 PROCEDURE — 97110 THERAPEUTIC EXERCISES: CPT | Mod: PN | Performed by: PHYSICAL THERAPIST

## 2024-12-17 NOTE — PROGRESS NOTES
[facility]   Physical Therapy Treatment Note     Name: Smooth Perez  Clinic Number: [id number]    Therapy Diagnosis:   Encounter Diagnoses   Name Primary?    Decreased range of motion of left knee Yes    Atrophy of quadriceps femoris muscle     Weakness of left quadriceps muscle     S/P arthroscopic reconstruction of ACL of left knee using quadriceps tendon autograft        Physician: Sarkis Meza MD    Visit Date: 12/17/2024    Physician Orders: PT Eval and Treat  Medical Diagnosis from Referral: Peripheral tear of medial meniscus of left knee as current injury, subsequent encounter [S83.222D], Rupture of anterior cruciate ligament of left knee, subsequent encounter [S83.512D]   Evaluation Date: 9/12/2024  Authorization Period Expiration: 12/31/24  Plan of Care Expiration: 12/31/2024  Progress Note Due: 12/21/24  Visit # / Visits authorized: 27/32 (1/1 eval)  FOTO: 1/3 (last performed on 9/12/2024)    Date of Surgery   9/10/24   Surgery Performed   AMNION TRIAL, RECONSTRUCTION, KNEE, ACL, ARTHROSCOPIC (Left)  LYSIS, ADHESIONS, KNEE, ARTHROSCOPIC (Left)      Post-Op Precautions   ACL recon protocol  Weight bearing: as tolerated  Range of motion: as tolerated  Antibiotics: 5 days of PO prophylactic antibiotics  DVT Ppx: Eliquis  PT/OT: Start POD#3  Follow-up: 10-14 days with AP/Lateral of operative knee (non-weightbearing)       Time In: 1:00 PM   Time Out: 2:00 PM  Total Billable Time: 60 minutes    SUBJECTIVE     Pt reports:  this is her last visit in [place] as she will be moving to [place] for school.   She was compliant with home exercise program.  Response to previous treatment: no notable change  Functional change: no notable change    Pain: 1/10  Location: left knee    OBJECTIVE             11/25/24:  PROM:  L: -1-0-138  R:-6-0-150  AROM:  L: 0-2-130  R: -3-0-140  Hip Abduction Dynamometry:  L: 21.2  R:20.5  LSI: 103%  Hip ER Dynamometry (seated 90/90)  L: 21.3  R:  21.9  LSI: 97%      Treatment     Smooth received the treatments listed below:      Intervention Code  (see below chart) 12/17/24   Upright Bike TE 5 minutes   [employer]:  Hamstring Scoops  Quad Pulls TA 1 x 10 yards each   Manual Therapy  MT Patella mobs; knee PROM to improve flexion and extension   Biodex Testing Physical Performance Test See objective section   Tank Pull TE 5 x 20 yard laps; L3       10  minutes of Therapeutic Exercise (TE) to develop strength, endurance, ROM, and flexibility. (36537)  10 minutes of Manual therapy (MT) to improve pain and ROM. (96999)  0 minutes of Neuromuscular Re-Education (NR)  to improve: Balance, Coordination, Kinesthetic, Sense, Proprioception, and Posture. (56049)  10 minutes of Therapeutic Activities (TA) to improve functional performance. (50667)    25 minutes Physical Performance Test  Unattended Electrical Stimulation (ES) for muscle performance and/or pain modulation. (96840)   Vasopneumatic Device Therapy () for management of swelling/edema. (75757)  BFR: Blood flow restriction applied during exercise  NP: Not Performed      Patient Education and Home Exercises     Home Exercises Provided and Patient Education Provided     Education provided:   - HEP, PT POC    Written Home Exercises Provided: yes. Exercises were reviewed and Smooth was able to demonstrate them prior to the end of the session.  Smooth demonstrated good  understanding of the education provided. See EMR under Patient Instructions for exercises provided during therapy sessions    ASSESSMENT     Patient tolerated today's treatment well. Discharge from care at [facility] today and plan to resume care in [place].  Goals:  Short Term Goals:  6 weeks Status  Date Met   PAIN: Pt will report worst pain of 4/10 in order to progress toward max functional ability and improve quality of life. [] Progressing  [x] Met  [] Not Met     FUNCTION: Patient will demonstrate improved function as indicated by a  functional score of greater than or equal to 5 out of 100 on FOTO. [x] Progressing  [] Met  [] Not Met     MOBILITY: Patient will improve AROM to 50% of stated goals, listed in objective measures above, in order to progress towards independence with functional activities.  [x] Progressing  [] Met  [] Not Met     STRENGTH: Patient will improve strength to 40% of stated goals, listed in objective measures above, in order to progress towards independence with functional activities. [x] Progressing  [] Met  [] Not Met     HEP: Patient will demonstrate independence with HEP in order to progress toward functional independence. [x] Progressing  [] Met  [] Not Met        Long Term Goals:  12 weeks Status Date Met   PAIN: Pt will report worst pain of 2/10 in order to progress toward max functional ability and improve quality of life [x] Progressing  [] Met  [] Not Met     FUNCTION: Patient will demonstrate improved function as indicated by a functional score of greater than or equal to 75 out of 100 on FOTO. [x] Progressing  [] Met  [] Not Met     MOBILITY: Patient will improve AROM to stated goals, listed in objective measures above, in order to return to maximal functional potential and improve quality of life. Goal: Full L knee ROM [x] Progressing  [] Met  [] Not Met     STRENGTH: Patient will improve strength to stated goals, listed in objective measures above, in order to improve functional independence and quality of life. Goal: 75% symmetry on Biodex Testing for LLE [x] Progressing  [] Met  [] Not Met     GAIT: Patient will demonstrate normalized gait mechanics with minimal compensation in order to return to PLOF. [x] Progressing  [] Met  [] Not Met        Final Goals:  9 months post-op Status Date Met   PAIN: Pt will report worst pain of 1/10 in order to progress toward max functional ability and improve quality of life [x] Progressing  [] Met  [] Not Met     FUNCTION: Patient will demonstrate improved function as  indicated by a functional score of greater than or equal to 90 out of 100 on FOTO. [x] Progressing  [] Met  [] Not Met     STRENGTH: Patient will improve strength to stated goals, listed in objective measures above, in order to improve functional independence and quality of life. Goal: 95% symmetry on Biodex Testing for LLE [x] Progressing  [] Met  [] Not Met     Patient will return to normal ADL's, IADL's, community involvement, recreational activities, and work-related activities including volleyball. [x] Progressing  [] Met  [] Not Met         PLAN   Continue per plan of care.  Progress as tolerated.    Iraj Huang, PT, DPT

## 2024-12-23 ENCOUNTER — DOCUMENTATION ONLY (OUTPATIENT)
Dept: RESEARCH | Facility: HOSPITAL | Age: 24
End: 2024-12-23
Payer: COMMERCIAL

## 2024-12-23 NOTE — PROGRESS NOTES
Protocol: Amnion Graft Study  Subject Number: 11  Visit: 3 month follow-up  Investigator: Dr. Sarkis Meza    Patient completed 2 week, 6 week, and 3 month follow up questionnaires via Patient PatientKeeper portal. Patient was recently seen 11 weeks post-op on 11/25/2024 by Dr. Meza. Please see physician note for details.     Patient will continue to complete questionnaires via Patient IQ until the end of study.

## 2025-02-28 ENCOUNTER — TELEPHONE (OUTPATIENT)
Dept: SPORTS MEDICINE | Facility: CLINIC | Age: 25
End: 2025-02-28
Payer: COMMERCIAL

## 2025-02-28 NOTE — TELEPHONE ENCOUNTER
I attempted to call the patient due to Dr. Meza having a change in his schedule and needing to reschedule her appointment. The VM box was full.

## 2025-03-04 ENCOUNTER — TELEPHONE (OUTPATIENT)
Dept: SPORTS MEDICINE | Facility: CLINIC | Age: 25
End: 2025-03-04
Payer: COMMERCIAL

## 2025-05-01 ENCOUNTER — OFFICE VISIT (OUTPATIENT)
Dept: SPORTS MEDICINE | Facility: CLINIC | Age: 25
End: 2025-05-01
Payer: COMMERCIAL

## 2025-05-01 ENCOUNTER — DOCUMENTATION ONLY (OUTPATIENT)
Dept: RESEARCH | Facility: HOSPITAL | Age: 25
End: 2025-05-01
Payer: COMMERCIAL

## 2025-05-01 VITALS — HEIGHT: 62 IN | WEIGHT: 128 LBS | BODY MASS INDEX: 23.55 KG/M2

## 2025-05-01 DIAGNOSIS — Z98.890 S/P ACL RECONSTRUCTION: Primary | ICD-10-CM

## 2025-05-01 DIAGNOSIS — M62.559 ATROPHY OF QUADRICEPS FEMORIS MUSCLE: ICD-10-CM

## 2025-05-01 DIAGNOSIS — S83.512D RUPTURE OF ANTERIOR CRUCIATE LIGAMENT OF LEFT KNEE, SUBSEQUENT ENCOUNTER: ICD-10-CM

## 2025-05-01 DIAGNOSIS — M62.81 WEAKNESS OF LEFT QUADRICEPS MUSCLE: ICD-10-CM

## 2025-05-01 PROCEDURE — 99999 PR PBB SHADOW E&M-EST. PATIENT-LVL III: CPT | Mod: PBBFAC,,, | Performed by: ORTHOPAEDIC SURGERY

## 2025-05-01 NOTE — PATIENT INSTRUCTIONS
Assessment:  Smooth Perez is a  24 y.o. female   PA Student with a chief complaint of Post-op Evaluation of the Left Knee    7 months status post left knee ACL reconstruction with partial thickness quad tendon autograft and lysis of adhesions on 9/10/24   Amnion Trial     Encounter Diagnoses   Name Primary?    S/P ACL reconstruction Yes    Weakness of left quadriceps muscle     Rupture of anterior cruciate ligament of left knee, subsequent encounter     Atrophy of quadriceps femoris muscle         Plan:  Biodex performed and reviewed today with the patient. She is currently only rehabbing 1x a week. We discussed supplementing on off days due to weakness on biodex. Patient has made progress since last time, but is behind from a timeline stanpoint. Discussed wanting strength to be at 70% before starting any running. Discussed risk of reinjury with knee weakness and to continue to careful with the knee.   Patient given home exercise program (sent via email) to supplement to work on quad strength           Follow-up: 3 months. Please reach out to us sooner if there are any problems between now and then.    About Dr. Derrick Meza's Research & Publications    Give us Feedback:   Google: Leave Google Review  Healthgrades: Leave Healthgrades Review

## 2025-05-01 NOTE — PROGRESS NOTES
Patient ID: Smooth Perez  YOB: 2000  MRN: [id number]    Chief Complaint: Post-op Evaluation of the Left Knee    Referred By: [facility] Website     History of Present Illness: Smooth Perez is a  24 y.o. female   PA Student with a chief complaint of Post-op Evaluation of the Left Knee    Onset: Traumatic  Inciting event and date: ACL tear requiring surgery on 9/10/24  Physical therapy focused on specific complaint (frequency and duration): 1x a week at [facility]  Injections:None     History of Present Illness    CHIEF COMPLAINT:  - Smooth presents for a 7-month post-op follow-up after left knee ACL reconstruction and lysis of adhesions.    HPI:  Smooth is 7 months post-op left knee ACL reconstruction and lysis of adhesions. Currently attending PT once a week, she takes long trail walks, specifically a 6.5-mile trail, but is concerned about potential risks. She expresses a desire to resume running. Quad strength symmetry is currently in the 50s percentage-wise, below the desired 70-80% at the 7-month vanessa post-surgery. Running before reaching 70% quad symmetry could increase the risk of ACL failure. Her current 19 credit hour summer semester may be impacting ability to attend more frequent PT sessions.    She denies using an internal brace. PT: Once a week, ongoing    SURGICAL HISTORY:  - Left knee ACL reconstruction with lysis of adhesions: 7 months ago (approximately September 10th)    WORK STATUS:  - Student  - Taking 19 credit hours in the summer semester      ROS:  Musculoskeletal: +exercise intolerance       Recall of previous HPI on 11/25/24: Smooth Perez presents today 11 weeks status post left knee ACL reconstruction with partial thickness quad tendon autograft and lysis of adhesions on 9/10/24 . She presents FWB/WBAT: left lower extremity with no brace brace orassistive devices. The patient has continued physical therapy at [facility], 2x a week. She  will be heading back to [place] for school in the next few weeks. She reorts the pain has gotten better.  Overall there are no issues or concerns.       Past Medical History:   Past Medical History:   Diagnosis Date    Sickle cell trait      Past Surgical History:   Procedure Laterality Date    AMNION TRIAL, RECONSTRUCTION, KNEE, ACL, ARTHROSCOPIC Left 9/10/2024    Procedure: AMNION TRIAL, RECONSTRUCTION, KNEE, ACL, ARTHROSCOPIC;  Surgeon: Sarkis Meza MD;  Location: [facility] OR;  Service: Orthopedics;  Laterality: Left;    LYSIS, ADHESIONS, KNEE, ARTHROSCOPIC Left 9/10/2024    Procedure: LYSIS, ADHESIONS, KNEE, ARTHROSCOPIC;  Surgeon: Sarkis Meza MD;  Location: [facility] OR;  Service: Orthopedics;  Laterality: Left;     No family history on file.  Social History[1]  Medication List with Changes/Refills   Current Medications    DOCUSATE SODIUM (COLACE) 100 MG CAPSULE    Take 1 capsule (100 mg total) by mouth 2 (two) times daily.    HYDROCODONE-ACETAMINOPHEN (NORCO) 5-325 MG PER TABLET    Take 1 tablet by mouth every 4 to 6 hours as needed for Pain.    LEVONORGESTREL (KYLEENA) 19.5 MG IUD    Kyleena    ONDANSETRON (ZOFRAN) 4 MG TABLET    Take 1 tablet (4 mg total) by mouth every 8 (eight) hours as needed for Nausea.    VALACYCLOVIR (VALTREX) 500 MG TABLET    Take 1 tablet by mouth every morning.     Review of patient's allergies indicates:  No Known Allergies  ROS    Physical Exam:   Body mass index is 23.41 kg/m².  There were no vitals filed for this visit.   GENERAL: Well appearing, appropriate for stated age, no acute distress.  CARDIOVASCULAR: Pulses regular by peripheral palpation.  PULMONARY: Respirations are even and non-labored.  NEURO: Awake, alert, and oriented x 3.  PSYCH: Mood & affect are appropriate.  HEENT: Head is normocephalic and atraumatic.              Left Knee Exam     Inspection   Scars: present  Effusion: absent    Tenderness   The patient is experiencing no tenderness.     Range of  Motion   Left knee extension stgstrstastdstest:st st1st. Left knee flexion: 135.     Tests   Meniscus   Andreas:  Medial - negative Lateral - negative  Stability   Lachman: normal (-1 to 2mm)   MCL - Valgus: normal (0 to 2mm)  LCL - Varus: normal (0 to 2mm)    Other   Sensation: normal    Comments:  Intact EHL, FHL, gastrocsoleus, and tibialis anterior. Sensation intact to light touch in superficial peroneal, deep peroneal, tibial, sural, and saphenous nerve distributions. Foot warm and well perfused with capillary refill of less than 2 seconds and palpable pedal pulses.     Muscle Strength   Left Lower Extremity   Hip Abduction: 5/5   Quadriceps:  4/5   Hamstrin/5     Vascular Exam       Left Pulses  Dorsalis Pedis:      2+  Posterior Tibial:      2+        Physical Exam    Musculoskeletal: HYPEREXTENSION IN KNEES. ACL feels normal.           Imaging:    X-ray Knee Ortho Left with Flexion  Narrative: EXAMINATION:  XR KNEE ORTHO LEFT WITH FLEXION    CLINICAL HISTORY:  . Other acute postprocedural pain    TECHNIQUE:  AP standing view of both knees, PA flexion standing views of both knees, and Merchant views of both knees were performed. A lateral view of the left knee was also performed.    COMPARISON:  2024    FINDINGS:  No acute osseous abnormality.  Left knee ACL repair postsurgical findings present.  Small left knee suprapatellar joint effusion suspected with mild prepatellar soft tissue edema.  Impression: As above    Electronically signed by: Zeke Berg MD  Date:    2024  Time:    16:27      Relevant imaging results reviewed and interpreted by me, discussed with the patient and / or family today.     Other Tests:     Assessment & Plan    PLAN SUMMARY:   Perform exercises 4-5 days a week at home   Avoid running until quad strength reaches 70% symmetry   Follow up in 3 months for re-testing    CHRONIC KNEE INSTABILITY:   Avoid running until quad strength reaches 70% symmetry.    MUSCLE ATROPHY AND  WEAKNESS:   Perform exercises 4-5 days a week at home.    FOLLOW-UP:   Follow up in 3 months for re-testing.         Patient Instructions   Assessment:  Smooth Perez is a  24 y.o. female   PA Student with a chief complaint of Post-op Evaluation of the Left Knee    7 months status post left knee ACL reconstruction with partial thickness quad tendon autograft and lysis of adhesions on 9/10/24   Amnion Trial     Encounter Diagnoses   Name Primary?    S/P ACL reconstruction Yes    Weakness of left quadriceps muscle     Rupture of anterior cruciate ligament of left knee, subsequent encounter     Atrophy of quadriceps femoris muscle         Plan:  Biodex performed and reviewed today with the patient. She is currently only rehabbing 1x a week. We discussed supplementing on off days due to weakness on biodex. Patient has made progress since last time, but is behind from a timeline stanpoint. Discussed wanting strength to be at 70% before starting any running. Discussed risk of reinjury with knee weakness and to continue to careful with the knee.   Patient given home exercise program (sent via email) to supplement to work on quad strength           Follow-up: 3 months. Please reach out to us sooner if there are any problems between now and then.    About Dr. Derrick Meza's Research & Publications    Give us Feedback:   Google: Leave Google Review  Healthgrades: Leave Healthgrades Review       Provider Note/Medical Decision Makin minutes was spent performing isokinetic testing using the Biodex by an assistant under Dr. Meza's guidance. The physical performance test was preformed under the direction of the physician  and reviewed by the physician. CPT: 47248 At least 15 minutes were spent developing, teaching, and performing a home exercise program.  A written summary was provided and all questions were answered. This service was performed by Dr. Sarkis Meza and his assistant under his direction. CPT  93830-ZI      I discussed worrisome and red flag signs and symptoms with the patient. The patient expressed understanding and agreed to alert me immediately or to go to the emergency room if they experience any of these. Treatment plan was developed with input from the patient/family, and they expressed understanding and agreement with the plan. All questions were answered today.          Sarkis Meza MD  Board Certified in Orthopaedic Surgery & Sports Medicine   [facility] Section Head of Orthopedic Surgery & Sports Medicine  [facility] [place]      Disclaimer: This note was prepared using a voice recognition system and is likely to have sound alike errors within the text.     This note was generated with the assistance of ambient listening technology. Verbal consent was obtained by the patient and accompanying visitor(s) for the recording of patient appointment to facilitate this note. I attest to having reviewed and edited the generated note for accuracy, though some syntax or spelling errors may persist. Please contact the author of this note for any clarification.    I, Moni Filomena, acted as a scribe for Sarkis Meza MD for the duration of this office visit.         [1]   Social History  Socioeconomic History    Marital status: Single   Tobacco Use    Smoking status: Never     Passive exposure: Never    Smokeless tobacco: Never   Substance and Sexual Activity    Alcohol use: Not Currently    Drug use: Not Currently     Types: Marijuana    Sexual activity: Not Currently     Partners: Male     Birth control/protection: I.U.D.     Social Drivers of Health     Financial Resource Strain: Low Risk  (12/3/2024)    Received from [facility] Its Subsidiaries and Affiliates    Overall Financial Resource Strain (CARDIA)     Difficulty of Paying Living Expenses: Not very hard   Food Insecurity: No Food  Insecurity (12/3/2024)    [facility]    Hunger Vital Sign     Worried About Running Out of Food in the Last Year: Never true     Ran Out of Food in the Last Year: Never true   Transportation Needs: No Transportation Needs (12/3/2024)    [facility]    PRAPARE - Transportation     Lack of Transportation (Medical): No     Lack of Transportation (Non-Medical): No   Physical Activity: Sufficiently Active (12/3/2024)    Received [facility]    Exercise Vital Sign     Days of Exercise per Week: 3 days     Minutes of Exercise per Session: 60 min   Stress: No Stress Concern Present (12/3/2024)    Received from [facility]    [employer] [place] [employer] - Occupational Stress Questionnaire     Feeling of Stress : Only a little   Housing Stability: Low Risk  (12/3/2024)    Received from [facility]    Housing Stability Vital Sign     Unable to Pay for Housing in the Last Year: No     Number of Times Moved in the Last Year: 0     Homeless in the Last Year: No

## 2025-05-01 NOTE — PROGRESS NOTES
Protocol: Amnion Graft Study  Subject Number: 11  Visit: 6 month follow-up  Investigator: Dr. Sarkis Meza     Patient completed 6month follow up questionnaires via Patient IQ portal. Patient was seen for 6 month follow up in clinic by Dr. Meza today. Please see physician note for details.

## 2025-07-17 ENCOUNTER — TELEPHONE (OUTPATIENT)
Dept: SPORTS MEDICINE | Facility: CLINIC | Age: 25
End: 2025-07-17
Payer: COMMERCIAL

## 2025-07-17 ENCOUNTER — PATIENT MESSAGE (OUTPATIENT)
Dept: SPORTS MEDICINE | Facility: CLINIC | Age: 25
End: 2025-07-17
Payer: COMMERCIAL

## 2025-07-17 NOTE — TELEPHONE ENCOUNTER
Attempted to contact patient to get appointment with Lamar rescheduled. Unable to reach patient, left message and sent MyChart message.

## 2025-07-21 ENCOUNTER — PATIENT MESSAGE (OUTPATIENT)
Dept: RESEARCH | Facility: HOSPITAL | Age: 25
End: 2025-07-21
Payer: COMMERCIAL

## 2025-08-04 ENCOUNTER — OFFICE VISIT (OUTPATIENT)
Dept: SPORTS MEDICINE | Facility: CLINIC | Age: 25
End: 2025-08-04
Payer: COMMERCIAL

## 2025-08-04 DIAGNOSIS — Z47.89 ORTHOPEDIC AFTERCARE: ICD-10-CM

## 2025-08-04 DIAGNOSIS — Z98.890 S/P ACL RECONSTRUCTION: Primary | ICD-10-CM

## 2025-08-04 DIAGNOSIS — M62.81 WEAKNESS OF LEFT QUADRICEPS MUSCLE: ICD-10-CM

## 2025-08-04 PROCEDURE — 1160F RVW MEDS BY RX/DR IN RCRD: CPT | Mod: CPTII,S$GLB,, | Performed by: PHYSICIAN ASSISTANT

## 2025-08-04 PROCEDURE — 1159F MED LIST DOCD IN RCRD: CPT | Mod: CPTII,S$GLB,, | Performed by: PHYSICIAN ASSISTANT

## 2025-08-04 PROCEDURE — 99213 OFFICE O/P EST LOW 20 MIN: CPT | Mod: S$GLB,,, | Performed by: PHYSICIAN ASSISTANT

## 2025-08-04 PROCEDURE — 99999 PR PBB SHADOW E&M-EST. PATIENT-LVL III: CPT | Mod: PBBFAC,,, | Performed by: PHYSICIAN ASSISTANT

## 2025-08-04 NOTE — PATIENT INSTRUCTIONS
ASSESSMENT:  Smooth Perez is a 24 y.o. female s/p knee surgery on 9/10/24    Status post ACL reconstruction with quad autograft  Lysis of adhesions  Healed meniscocapsular injury to medial meniscus     Encounter Diagnoses   Name Primary?    S/P ACL reconstruction Yes    Weakness of left quadriceps muscle     Orthopedic aftercare         PLAN:  Physical Therapy  [facility]  ACL reconstruction protocol   Restrictions  WB:  Full weight-bearing as tolerated  ROM:  Full range of motion as tolerated  Bile duct performed today in clinic, patient cleared for running  Follow-up  Two months with Dr. Meza for her 1 year postop office visit  Return to clinic sooner if any symptoms change or worsen

## 2025-08-04 NOTE — PROGRESS NOTES
Tu Jesus PA-C  Orthopedic Surgery / Sports Medicine  [facility]      PATIENT ID: Smooth Perez  YOB: 2000  MRN: [id number]    Chief Complaint: Post-op Evaluation of the Knee    HISTORY OF PRESENT ILLNESS:   Smooth Perez is a 24 y.o. female  s/p knee surgery on 9/10/24    Status post ACL reconstruction with quad autograft  Lysis of adhesions  Healed meniscocapsular injury to medial meniscus     Physical Therapy:  [place]    History of Present Illness    CHIEF COMPLAINT:  Patient presents for follow-up s/p left knee surgery.    HPI:  Patient presents for follow-up approximately 10.5-11 months post-knee surgery. She reports feeling alright and states that she is not in any pain, which has been stable for a while. Her knee feels normal about 80-90% of the time, with occasional reminders of the surgery. She describes occasional stiffness, which she manages by stretching or keeping the knee moving. She notes that sitting in certain positions, such as cross-legged, can cause aching after about 10 minutes, likely due to the knee not being accustomed to that position. In the morning, it sometimes takes 5-10 minutes of working and stretching the knee before it feels normal.    Prior to the injury, she was a regular runner. She has been doing a lot of walking to stay active and expresses eagerness to return to running, stating she would be ready to start on the treadmill as soon as she receives approval.    She denies any history of other knee injuries or surgeries.  Denies any recurrent injuries.  No feelings of instability.  No fever night sweats chills.  No radiating pain in the leg.  No numbness or tingling.  No other issues or other orthopedic complaints currently at this time.  Unless symptoms much improved compared to last office visit and getting better with her rehab postoperatively.    SURGICAL HISTORY:  Patient underwent ACL repair surgery on September 10th,  2024      Patient was queried and this is the extent of the patients current complaints today.    PHYSICAL EXAM:   GENERAL: Well appearing, appropriate for stated age, no acute distress, well nourished.  CARDIOVASCULAR:  No obvious cyanosis, extremities warm and well perfused.  PULMONARY: Normal respiratory effort, even and unlabored respirations.  NEURO: Awake, alert, and oriented x 3. NAD.  PSYCH: Mood & affect are appropriate.  SKIN: No readily visible rashes or skin breakdown.  HEENT: Head is normocephalic and atraumatic.    Ortho/SPM Exam  Left Knee Exam  Inspection: No effusion, erythema, or ecchymosis , No external signs or symptoms of infection  Palpation tenderness:  None  Range of motion:   Extension:  0°  Flexion:  135°  Strength:  5/5 Extension/Quad                   5/5 Flexion/Hamstring  All compartments are soft and compressible.   Calf soft non-tender.   Intact EHL, FHL, gastrocsoleus, and tibialis anterior.   Sensation intact to light touch in superficial peroneal, deep peroneal, tibial, sural, and saphenous nerve distributions.   Foot warm and well perfused with capillary refill of less than 2 seconds and palpable pedal pulses.    IMAGING:  Relevant imaging results reviewed and interpreted by me, discussed with the patient and / or family today.     No postoperative x-rays were performed at today's office visit    ASSESSMENT / PLAN:    Patient Instructions   ASSESSMENT:  Smooth Perez is a 24 y.o. female s/p knee surgery on 9/10/24    Status post ACL reconstruction with quad autograft  Lysis of adhesions  Healed meniscocapsular injury to medial meniscus     Encounter Diagnoses   Name Primary?    S/P ACL reconstruction Yes    Weakness of left quadriceps muscle     Orthopedic aftercare         PLAN:  Physical Therapy  [place]  ACL reconstruction protocol   Restrictions  WB:  Full weight-bearing as tolerated  ROM:  Full range of motion as tolerated  Bile duct performed today in clinic,  "patient cleared for running  Follow-up  Two months with Dr. Meza for her 1 year postop office visit  Return to clinic sooner if any symptoms change or worsen            Tu Jesus PA-C  Sports Medicine Physician Assistant       Disclaimer:   This note was prepared using a voice recognition system and is likely to have sound alike errors within the text.   I discussed worrisome and red flag signs and symptoms with the patient. The patient expressed understanding and agreed to alert me immediately or to go to the emergency room if they experience any of these.   Treatment plan was developed with input from the patient/family, and they expressed understanding and agreement with the plan. All questions were answered today.      PAST MEDICAL HISTORY:   Estimated body mass index is 23.41 kg/m² as calculated from the following:    Height as of 5/1/25: 5' 2" (1.575 m).    Weight as of 5/1/25: 58.1 kg (128 lb).  Past Medical History:   Diagnosis Date    Sickle cell trait      Past Surgical History:   Procedure Laterality Date    AMNION TRIAL, RECONSTRUCTION, KNEE, ACL, ARTHROSCOPIC Left 9/10/2024    Procedure: AMNION TRIAL, RECONSTRUCTION, KNEE, ACL, ARTHROSCOPIC;  Surgeon: Sarkis Meza MD;  Location: [facility];  Service: Orthopedics;  Laterality: Left;    LYSIS, ADHESIONS, KNEE, ARTHROSCOPIC Left 9/10/2024    Procedure: LYSIS, ADHESIONS, KNEE, ARTHROSCOPIC;  Surgeon: Sarkis Meza MD;  Location: [facility] OR;  Service: Orthopedics;  Laterality: Left;     No family history on file.  Social History[1]  Medication List with Changes/Refills   Current Medications    DOCUSATE SODIUM (COLACE) 100 MG CAPSULE    Take 1 capsule (100 mg total) by mouth 2 (two) times daily.    HYDROCODONE-ACETAMINOPHEN (NORCO) 5-325 MG PER TABLET    Take 1 tablet by mouth every 4 to 6 hours as needed for Pain.    LEVONORGESTREL (KYLEENA) 19.5 MG IUD    Kyleena    ONDANSETRON (ZOFRAN) 4 MG TABLET    Take 1 tablet (4 mg total) by mouth " every 8 (eight) hours as needed for Nausea.    VALACYCLOVIR (VALTREX) 500 MG TABLET    Take 1 tablet by mouth every morning.     Review of patient's allergies indicates:  No Known Allergies  Review of Systems   Constitutional: Negative for chills, fever, night sweats, weight gain and weight loss.   Respiratory:  Negative for shortness of breath.    Skin:  Negative for rash and suspicious lesions.   Gastrointestinal:  Negative for bowel incontinence, nausea and vomiting.   Genitourinary:  Negative for bladder incontinence.   Neurological:  Negative for numbness, paresthesias and sensory change.   Psychiatric/Behavioral:  Negative for altered mental status.           [1]   Social History  Socioeconomic History    Marital status: Single   Tobacco Use    Smoking status: Never     Passive exposure: Never    Smokeless tobacco: Never   Substance and Sexual Activity    Alcohol use: Not Currently    Drug use: Not Currently     Types: Marijuana    Sexual activity: Not Currently     Partners: Male     Birth control/protection: I.U.D.     Social Drivers of Health     Financial Resource Strain: Low Risk  (12/3/2024)    Received from [facility] and Affili[facility]    Overall Financial Resource Strain (CARDIA)     Difficulty of Paying Living Expenses: Not very hard   Food Insecurity: No Food Insecurity (12/3/2024)    Received from [facility] and [facility]    Hunger Vital Sign     Worried About Running Out of Food in the Last Year: Never true     Ran Out of Food in the Last Year: Never true   Transportation Needs: No Transportation Needs (12/3/2024)    Received from [facility] Its Subsidiaries and Affiliates    PRAPARE - Transportation     Lack of Transportation (Medical): No     Lack of Transportation (Non-Medical): No   Physical Activity: Sufficiently Active (12/3/2024)     Received from [facility] and Its Subsidiaries and Affiliates    Exercise Vital Sign     Days of Exercise per Week: 3 days     Minutes of Exercise per Session: 60 min   Stress: No Stress Concern Present (12/3/2024)    [facility]    [employer] - Occupational Stress Questionnaire     Feeling of Stress : Only a little   Housing Stability: Low Risk  (12/3/2024)    [facility]    Housing Stability Vital Sign     Unable to Pay for Housing in the Last Year: No     Number of Times Moved in the Last Year: 0     Homeless in the Last Year: No

## (undated) DEVICE — MANIFOLD 4 PORT

## (undated) DEVICE — BANDAGE ACE DOUBLE STER 6IN

## (undated) DEVICE — SUT 3-0 MONOCRYL PLUS PS-2

## (undated) DEVICE — PAD ABDOMINAL STERILE 8X10IN

## (undated) DEVICE — DRAPE STERI INSTRUMENT 1018

## (undated) DEVICE — BLADE SURG CARBON STEEL #10

## (undated) DEVICE — SUT VICRYL PLUS 2-0 CT1 18

## (undated) DEVICE — BLADE SURG #15 CARBON STEEL

## (undated) DEVICE — GLOVE PROTEXIS LTX  8.5

## (undated) DEVICE — MAT SURGICAL ECOSUCTIONER

## (undated) DEVICE — SUT PROLENE 3-0 PS-2 BL 18IN

## (undated) DEVICE — TAPE SURGICAL MICROFOAM 3IN

## (undated) DEVICE — PACK BASIC SETUP SC BR

## (undated) DEVICE — POSITIONER HEAD DONUT 9IN FOAM

## (undated) DEVICE — SHAVER SABRETOOTH 4MMX12.5CM

## (undated) DEVICE — SUT VICRYL PLUS 0 CT1 18IN

## (undated) DEVICE — BANDAGE ESMARK ELASTIC ST 6X9

## (undated) DEVICE — ADHESIVE MASTISOL VIAL 48/BX

## (undated) DEVICE — ELECTRODE REM PLYHSV RETURN 9

## (undated) DEVICE — TUBING SUCTION STRAIGHT .25X20

## (undated) DEVICE — BNDG COFLEX FOAM LF2 ST 4X5YD

## (undated) DEVICE — PAD CAST SPECIALIST STRL 6

## (undated) DEVICE — SUT CTD VICRYL 0 UND BR SUT

## (undated) DEVICE — PASSER SUTURE SCORPION 3.2MM

## (undated) DEVICE — TOWEL OR DISP STRL BLUE 4/PK

## (undated) DEVICE — DRAPE U SPLIT SHEET 54X76IN

## (undated) DEVICE — KIT TURNOVER

## (undated) DEVICE — STRIP MEDI WND CLSR 1/2X4IN

## (undated) DEVICE — UNDERGLOVES BIOGEL PI SZ 6 LF

## (undated) DEVICE — BNDG COFLEX FOAM LF2 ST 6X5YD

## (undated) DEVICE — DRAPE THREE-QTR REINF 53X77IN

## (undated) DEVICE — COVER TABLE HVY DTY 60X90IN

## (undated) DEVICE — APPLICATOR CHLORAPREP ORN 26ML

## (undated) DEVICE — SUPPORT ULNA NERVE PROTECTOR

## (undated) DEVICE — SOCKINETTE IMPERVIOUS 12X48IN

## (undated) DEVICE — SPONGE COTTON TRAY 4X4IN

## (undated) DEVICE — UNDERGLOVE BIOGEL PI SZ 6.5 LF

## (undated) DEVICE — INSTRUMENT FRAZIER 10FR W/VENT

## (undated) DEVICE — GOWN POLY REINF BRTH SLV XL

## (undated) DEVICE — PROBE MULTI PORT RF 90 DEGREE

## (undated) DEVICE — MARKER SKIN RULER STERILE

## (undated) DEVICE — DRAPE MINI C-ARM

## (undated) DEVICE — SAWBLADE TRANS TIB ACL

## (undated) DEVICE — Device

## (undated) DEVICE — SOL IRR NACL .9% 3000ML

## (undated) DEVICE — COVER PROXIMA MAYO STAND

## (undated) DEVICE — TOURNIQUET SB QC DP 34X4IN

## (undated) DEVICE — GOWN SMARTGOWN LVL4 X-LONG XL

## (undated) DEVICE — BLADE SHAVER TORPEDO 4MMX13CM

## (undated) DEVICE — GLOVE SURG ULTRA TOUCH 6

## (undated) DEVICE — TUBING PUMP ARTHROSCOPY STRL

## (undated) DEVICE — UNDERGLOVES BIOGEL PI SIZE 8.5

## (undated) DEVICE — TAPE SILK 3IN

## (undated) DEVICE — COVER CAMERA OPERATING ROOM

## (undated) DEVICE — DRAPE T EXTRM SURG 121X128X90

## (undated) DEVICE — NDL SPINAL 18GX3.5 SPINOCAN

## (undated) DEVICE — COVER LIGHT HANDLE 80/CA

## (undated) DEVICE — DRAPE INCISE IOBAN 2 23X17IN